# Patient Record
Sex: FEMALE | Race: AMERICAN INDIAN OR ALASKA NATIVE
[De-identification: names, ages, dates, MRNs, and addresses within clinical notes are randomized per-mention and may not be internally consistent; named-entity substitution may affect disease eponyms.]

---

## 2017-06-28 ENCOUNTER — HOSPITAL ENCOUNTER (EMERGENCY)
Dept: HOSPITAL 5 - ED | Age: 52
LOS: 10 days | Discharge: PSYCHIATRIC OR SUBSTANCE USE FACILITY | End: 2017-07-08
Payer: MEDICAID

## 2017-06-28 DIAGNOSIS — F29: ICD-10-CM

## 2017-06-28 DIAGNOSIS — N39.0: ICD-10-CM

## 2017-06-28 DIAGNOSIS — F20.0: Primary | ICD-10-CM

## 2017-06-28 LAB
ANION GAP SERPL CALC-SCNC: 20 MMOL/L
BASOPHILS NFR BLD AUTO: 0.3 % (ref 0–1.8)
BILIRUB UR QL STRIP: (no result)
BLOOD UR QL VISUAL: (no result)
BUN SERPL-MCNC: 11 MG/DL (ref 7–17)
BUN/CREAT SERPL: 13.75 %
CALCIUM SERPL-MCNC: 9 MG/DL (ref 8.4–10.2)
CHLORIDE SERPL-SCNC: 103.8 MMOL/L (ref 98–107)
CO2 SERPL-SCNC: 22 MMOL/L (ref 22–30)
EOSINOPHIL NFR BLD AUTO: 0.4 % (ref 0–4.3)
GLUCOSE SERPL-MCNC: 89 MG/DL (ref 65–100)
HCT VFR BLD CALC: 40.7 % (ref 30.3–42.9)
HGB BLD-MCNC: 13.3 GM/DL (ref 10.1–14.3)
KETONES UR STRIP-MCNC: (no result) MG/DL
LEUKOCYTE ESTERASE UR QL STRIP: (no result)
MCH RBC QN AUTO: 28 PG (ref 28–32)
MCHC RBC AUTO-ENTMCNC: 33 % (ref 30–34)
MCV RBC AUTO: 85 FL (ref 79–97)
MUCOUS THREADS #/AREA URNS HPF: (no result) /HPF
NITRITE UR QL STRIP: (no result)
PH UR STRIP: 5 [PH] (ref 5–7)
PLATELET # BLD: 166 K/MM3 (ref 140–440)
POTASSIUM SERPL-SCNC: 3.8 MMOL/L (ref 3.6–5)
RBC # BLD AUTO: 4.77 M/MM3 (ref 3.65–5.03)
RBC #/AREA URNS HPF: 9 /HPF (ref 0–6)
SODIUM SERPL-SCNC: 142 MMOL/L (ref 137–145)
URINE DRUGS OF ABUSE NOTE: (no result)
UROBILINOGEN UR-MCNC: < 2 MG/DL (ref ?–2)
WBC # BLD AUTO: 6.3 K/MM3 (ref 4.5–11)
WBC #/AREA URNS HPF: > 182 /HPF (ref 0–6)

## 2017-06-28 PROCEDURE — G0480 DRUG TEST DEF 1-7 CLASSES: HCPCS

## 2017-06-28 PROCEDURE — 36415 COLL VENOUS BLD VENIPUNCTURE: CPT

## 2017-06-28 PROCEDURE — 80048 BASIC METABOLIC PNL TOTAL CA: CPT

## 2017-06-28 PROCEDURE — 96372 THER/PROPH/DIAG INJ SC/IM: CPT

## 2017-06-28 PROCEDURE — 80320 DRUG SCREEN QUANTALCOHOLS: CPT

## 2017-06-28 PROCEDURE — 99285 EMERGENCY DEPT VISIT HI MDM: CPT

## 2017-06-28 PROCEDURE — 80307 DRUG TEST PRSMV CHEM ANLYZR: CPT

## 2017-06-28 PROCEDURE — 85025 COMPLETE CBC W/AUTO DIFF WBC: CPT

## 2017-06-28 PROCEDURE — 81001 URINALYSIS AUTO W/SCOPE: CPT
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
## 2017-06-29 RX ADMIN — NITROFURANTOIN MONOHYDRATE/MACROCRYSTALS SCH MG: 75; 25 CAPSULE ORAL at 21:23

## 2017-06-29 RX ADMIN — NITROFURANTOIN MONOHYDRATE/MACROCRYSTALS SCH MG: 75; 25 CAPSULE ORAL at 21:22

## 2017-06-29 RX ADMIN — NITROFURANTOIN MONOHYDRATE/MACROCRYSTALS SCH MG: 75; 25 CAPSULE ORAL at 10:22

## 2017-06-29 RX ADMIN — LORAZEPAM SCH MG: 1 TABLET ORAL at 21:22

## 2017-06-29 NOTE — CONSULTATION
History of Present Illness





- Reason for Consult


Consult date: 06/29/17


Reason for consult: Mental Health Evaluation


Requesting physician: ARIES PERLA





- Chief Complaint


Chief complaint: 


"Patient is nonverbal at this time"








- History of Present Psychiatric Illness


52-year-old female with past medical history of paranoid schizophrenia has 

presented to ED secondary to concerns by her caretaker. Today patient is 

nonverbal with a rigid posture. Patient would not answer any questions, other 

than saying hello to me. She would stare at the wall when asked questions, 

possibly responding to some type of stimuli. No gestures of SI/HI's. 








Medications and Allergies


 Allergies











Allergy/AdvReac Type Severity Reaction Status Date / Time


 


haloperidol [From Haldol] AdvReac  Unknown Verified 07/31/16 20:22











 Home Medications











 Medication  Instructions  Recorded  Confirmed  Last Taken  Type


 


Benztropine [Cogentin] 1 mg PO BID 07/30/16 06/28/17 Unknown History


 


Divalproex Sodium [Depakote] 1,500 mg PO QHS 07/30/16 06/28/17 Unknown History


 


Paliperidone Palmitate [Invega 325 mg IM Q3W 07/30/16 06/28/17 Unknown History





Sustenna]     


 


Aspirin [Aspirin TAB] 81 mg PO QDAY #30 tablet 08/01/16 06/28/17 Unknown Rx


 


Nitrofurantoin Mono/M-Cryst 100 mg PO Q12HR #14 capsule 06/29/17  Unknown Rx





[Macrobid CAP]     











Active Meds: 


Active Medications





Nitrofurantoin Macrocrystals (Macrobid)  100 mg PO Q12HR KATHYA


   Last Admin: 06/29/17 10:22 Dose:  100 mg











Past psychiatric history





- Past Medical History


Past Medical History: other (unable to obtain)


Past Surgical History: Other (unable to obtain)





- past Psychiatric treatment and history


psychiatric treatment history: 


Unable to obtain psy hx or fam psy hx








- Social History


Social history: other (unable to obtain)





Mental Status Exam





- Vital signs


 Last Vital Signs











Temp  97.7 F   06/29/17 11:25


 


Pulse  84   06/29/17 11:25


 


Resp  20   06/29/17 11:39


 


BP  110/69   06/29/17 11:25


 


Pulse Ox  97   06/29/17 11:39














- Exam


Narrative exam: 


Unable to complete MSE. Patient presenting with a rigid posture.








Results


Result Diagrams: 


 06/28/17 19:02





 06/28/17 19:02


 Abnormal lab results











  06/28/17 06/28/17 06/28/17 Range/Units





  18:45 19:02 19:02 


 


RDW   15.6 H   (13.2-15.2)  %


 


Mono % (Auto)   8.5 H   (0.0-7.3)  %


 


Ur Specific Gravity  1.032 H    (1.003-1.030)  


 


Urine WBC (Auto)  > 182.0 H    (0.0-6.0)  /HPF


 


U Epithel Cells (Auto)  16.0 H    (0-13.0)  /HPF


 


Salicylates    < 0.3 L  (2.8-20.0)  mg/dL








All other labs normal.








Assessment and Plan


Assessment and plan: 


Impression: Historical Dx: Schizophrenia, Possible Catatonia. Today patient is 

nonverbal with a rigid posture. Patient would not answer any questions, other 

than saying hello to me. No gestures of SI/HI's. .





DDx: Unspecified Psychosis DO





Recommendation/Plan: Continue 1013 with placement to inpatient psy services. 

Start Ativan 1 mg PO TID for catatonia. Will continue to assess patient daily 

to determine further treatment. Recommend GI and PE prophylaxis. Monitor patient

's V/S's and O2 sats Q4hrs.

## 2017-06-30 RX ADMIN — LORAZEPAM SCH MG: 1 TABLET ORAL at 10:25

## 2017-06-30 RX ADMIN — NITROFURANTOIN MONOHYDRATE/MACROCRYSTALS SCH MG: 75; 25 CAPSULE ORAL at 22:04

## 2017-06-30 RX ADMIN — LORAZEPAM SCH: 1 TABLET ORAL at 19:43

## 2017-06-30 RX ADMIN — LORAZEPAM SCH: 1 TABLET ORAL at 17:48

## 2017-06-30 RX ADMIN — NITROFURANTOIN MONOHYDRATE/MACROCRYSTALS SCH MG: 75; 25 CAPSULE ORAL at 10:25

## 2017-06-30 NOTE — PROGRESS NOTE
Subjective





- Reason for Consult


Consult date: 06/30/17


Reason for consult: follow up





- Chief Complaint


Chief complaint: 


no chief complaint





52-year-old female with past medical history of paranoid schizophrenia 

presented to the ED secondary to concerns by her caregiver. Today patient is 

nonverbal but will move her head with a nod or shake in response to questions. 

She shook her head when she asked about problems with her appetite or sleep. 

After attempts to interview her, she states "I have to go to the bathroom." She 

did so without difficulty. No gestures of SI/HI's. 





Mental Status Exam





- Vital signs


 Last Vital Signs











Temp  97.7 F   06/30/17 10:51


 


Pulse  81   06/30/17 10:51


 


Resp  18   06/30/17 10:26


 


BP  109/76   06/30/17 10:51


 


Pulse Ox  99   06/30/17 10:51














- Exam


Narrative exam: 





minimally cooperative


steady gait


unable to complete MSE.


Orientation: person


Level of consciousness: alert





Assessment and Plan





Impression: Historical Dx: Schizophrenia, Possible Catatonia. Today patient is 

mostly non verbal Patient would not answer any questions but would shake or nod 

her head in response to some questions. No gestures of SI/HI's.





DDx: Unspecified Psychosis DO





Recommendation/Plan: Continue 1013 with placement to inpatient psy services. 

Continue Ativan 1 mg PO TID for catatonia. Will continue to assess patient 

daily to determine further treatment. Recommend GI and PE prophylaxis. Monitor 

patient's V/S's and O2 sats Q4hrs.

## 2017-07-01 RX ADMIN — LORAZEPAM SCH: 1 TABLET ORAL at 20:11

## 2017-07-01 RX ADMIN — NITROFURANTOIN MONOHYDRATE/MACROCRYSTALS SCH MG: 75; 25 CAPSULE ORAL at 10:49

## 2017-07-01 RX ADMIN — LORAZEPAM SCH MG: 1 TABLET ORAL at 09:00

## 2017-07-01 RX ADMIN — LORAZEPAM SCH MG: 1 TABLET ORAL at 14:10

## 2017-07-01 RX ADMIN — NITROFURANTOIN MONOHYDRATE/MACROCRYSTALS SCH MG: 75; 25 CAPSULE ORAL at 22:10

## 2017-07-01 NOTE — PROGRESS NOTE
Subjective





- Reason for Consult


Reason for consult: disorganized





Mental Status Exam





- Vital signs


 Last Vital Signs











Temp  97.8 F   07/01/17 12:25


 


Pulse  84   07/01/17 12:25


 


Resp  18   07/01/17 12:25


 


BP  120/79   07/01/17 12:25


 


Pulse Ox  100   07/01/17 12:25














Assessment and Plan





Subjectively, patient continues to be fairly withdrawn and minimally responsive 

to questioning.  The nursing staff do note the patient's ability to engage them 

has improved marginally over the past 24 hours.





General Appearance: In hospital gown


Sensorium/Consciousness: Somewhat sedated


Orientation:  person, place


Eye Contact: limited


Attitude / Behavior: guarded


Psychomotor &


Musculoskeletal Activity: PMR, rigidity noted on upper extremities


Mood: Withdrawn


Affect: Flat


Speech / Language: Nonspontaneous, reduced rate and volume


Thought Processes: Perseverative and disorganized


Thought Content: Did not report


Perception: Endorses auditory hallucinations


Insight: limited


Judgement: limitied


Capacity for ADLs: independent








Plan:


Continue lorazepam 1 mg 3 times a day with possibility of increasing the 

frequency of this medication


We'll continue to evaluate if the patient can be rechallenged or challenged 

with an antipsychotic given the current presentation


Continue to refer this patient for inpatient level of care

## 2017-07-02 RX ADMIN — LORAZEPAM SCH MG: 1 TABLET ORAL at 08:03

## 2017-07-02 RX ADMIN — NITROFURANTOIN MONOHYDRATE/MACROCRYSTALS SCH MG: 75; 25 CAPSULE ORAL at 10:05

## 2017-07-02 RX ADMIN — LORAZEPAM SCH MG: 1 TABLET ORAL at 22:23

## 2017-07-02 RX ADMIN — NITROFURANTOIN MONOHYDRATE/MACROCRYSTALS SCH MG: 75; 25 CAPSULE ORAL at 22:22

## 2017-07-02 RX ADMIN — LORAZEPAM SCH MG: 1 TABLET ORAL at 14:07

## 2017-07-02 RX ADMIN — LORAZEPAM SCH MG: 1 TABLET ORAL at 18:03

## 2017-07-02 NOTE — PROGRESS NOTE
Subjective





- Reason for Consult


Reason for consult: disorganized





Mental Status Exam





- Vital signs


 Last Vital Signs











Temp  97.5 F L  07/02/17 09:30


 


Pulse  96 H  07/02/17 09:30


 


Resp  18   07/02/17 09:30


 


BP  104/64   07/02/17 09:30


 


Pulse Ox  100   07/02/17 09:30














Assessment and Plan





Subjectively, patient continues to be fairly withdrawn and minimally responsive 

to questioning.  Patient still malodorous, and expressing auditory 

hallucinations.  Patient still having psychomotor retardation.





General Appearance: In hospital gown


Sensorium/Consciousness: Somewhat sedated


Orientation:  person, place


Eye Contact: limited


Attitude / Behavior: guarded


Psychomotor &


Musculoskeletal Activity: PMR, rigidity noted on upper extremities


Mood: Withdrawn


Affect: Flat


Speech / Language: Nonspontaneous, reduced rate and volume


Thought Processes: Perseverative and disorganized


Thought Content: Did not report


Perception: Endorses auditory hallucinations


Insight: limited


Judgement: limitied


Capacity for ADLs: independent








Plan:


Increase to lorazepam 1 mg 4 times a day


Continue to refer this patient for inpatient level of care

## 2017-07-03 RX ADMIN — LORAZEPAM SCH MG: 1 TABLET ORAL at 21:55

## 2017-07-03 RX ADMIN — NITROFURANTOIN MONOHYDRATE/MACROCRYSTALS SCH MG: 75; 25 CAPSULE ORAL at 21:55

## 2017-07-03 RX ADMIN — NITROFURANTOIN MONOHYDRATE/MACROCRYSTALS SCH MG: 75; 25 CAPSULE ORAL at 09:50

## 2017-07-03 NOTE — PROGRESS NOTE
Subjective





- Reason for Consult


Consult date: 07/03/17


Reason for consult: Psychiatry Follow-up





- Chief Complaint


Chief complaint: 


"How are you"





52-year-old female with past medical history of paranoid schizophrenia 

presented to the ED secondary to concerns by her caregiver. Today patient is 

more engaging by saying "hello" and shaking my hand. She sit up when asked. No 

gestures of SI/HI's and AVH's.  





Mental Status Exam





- Vital signs


 Last Vital Signs











Temp  98.0 F   07/03/17 07:36


 


Pulse  81   07/03/17 07:36


 


Resp  16   07/03/17 07:38


 


BP  123/69   07/03/17 07:36


 


Pulse Ox  100   07/03/17 07:36














- Exam


Narrative exam: 


MSE:





 Appearance: calm, cooperative


 Behavior: poor eye contact


 Speech: regular rate and tone


 Mood: "I am not sure" 


 Affect: flat


 Thought Process: unable to assess


 Thought Content: no gestures of SI/HI's and AVH's


 Motor Activity: lying in bed, no rigidity


 Cognition: A/Ox 1


 Insight: limited


 Judgment: limited











Assessment and Plan


Impression:  Historical Dx: Schizophrenia, Possible Catatonia. Today patient is 

more engaging by saying "hello" and shaking my hand. No rigidity noted. No 

gestures of SI/HI's and AVH's.





Recommendation/Plan: Continue 1013 with placement to inpatient psy services. 

Continue Ativan 1 mg PO QID for catatonia. Will continue to assess patient 

daily to determine further treatment. Recommend GI and PE prophylaxis. Monitor 

patient's V/S's and O2 sats Q4hrs.

## 2017-07-04 RX ADMIN — NITROFURANTOIN MONOHYDRATE/MACROCRYSTALS SCH MG: 75; 25 CAPSULE ORAL at 10:00

## 2017-07-04 RX ADMIN — LORAZEPAM SCH MG: 1 TABLET ORAL at 20:59

## 2017-07-04 RX ADMIN — NITROFURANTOIN MONOHYDRATE/MACROCRYSTALS SCH MG: 75; 25 CAPSULE ORAL at 21:38

## 2017-07-04 RX ADMIN — LORAZEPAM SCH MG: 1 TABLET ORAL at 10:00

## 2017-07-04 NOTE — PROGRESS NOTE
Subjective





- Reason for Consult


Consult date: 17


Reason for consult: Psychiatry Follow-up





- Chief Complaint


Chief complaint: 


"Hello" 





52-year-old female with past medical history of paranoid schizophrenia 

presented to the ED secondary to concerns by her caregiver. Today patient is 

calm and cooperative during assessment. She greeted me when I arrived to her 

room. She stated that she ate her breakfast. Also, patient acknowledged that 

she resides in a group home. She could not tell me her  and her current 

location. She denies SI/HI's and AVH's. 





Mental Status Exam





- Vital signs


 Last Vital Signs











Temp  97.8 F   17 03:45


 


Pulse  96 H  17 03:45


 


Resp  16   17 03:45


 


BP  105/57   17 03:45


 


Pulse Ox  98   17 03:45














- Exam


Narrative exam: 


MSE:





 Appearance: calm, cooperative


 Behavior: poor eye contact


 Speech: regular rate and tone


 Mood: "fine" 


 Affect: labile


 Thought Process: circumstantial


 Thought Content: denies SI/HI's and AVH's


 Motor Activity: sitting up in bed, no rigidity


 Cognition: A/Ox 2


 Insight: limited


 Judgment: limited








Assessment and Plan


Impression:  Historical Dx: Schizophrenia, Possible Catatonia. Today patient is 

calm and cooperative during assessment. She greeted me when I arrived to her 

room. No rigidity noted. Patient denies SI/HI's and AVH's. Patient was not 

administered Ativan yesterday (7/3/2017) x 3.  





Recommendation/Plan: Continue 1013 with placement to inpatient psy services. 

Continue Ativan to 1 mg PO QID for catatonia. Will continue to assess patient 

daily to determine further treatment. Recommend GI and PE prophylaxis. Monitor 

patient's V/S's and O2 sats Q4hrs.

## 2017-07-05 RX ADMIN — ZIPRASIDONE HYDROCHLORIDE SCH MG: 20 CAPSULE ORAL at 23:00

## 2017-07-05 RX ADMIN — NITROFURANTOIN MONOHYDRATE/MACROCRYSTALS SCH MG: 75; 25 CAPSULE ORAL at 23:00

## 2017-07-05 NOTE — PROGRESS NOTE
Subjective





- Reason for Consult


Consult date: 07/05/17


Reason for consult: follow up





- Chief Complaint


Chief complaint: 


no response 





52-year-old female with past medical history of paranoid schizophrenia 

presented to the ED secondary to concerns by her caregiver. Today patient is 

calm and  was attentive for most interview questions. She was eating when I 

arrived to the room and was rocking side to side. She denies SI/HI's and AVH's. 

She was later observed responding to internal stimuli. 





Mental Status Exam





- Vital signs


 Last Vital Signs











Temp  98.1 F   07/05/17 07:15


 


Pulse  74   07/05/17 07:15


 


Resp  20   07/05/17 07:44


 


BP  136/88   07/05/17 07:15


 


Pulse Ox  100   07/05/17 07:44














Assessment and Plan





MSE:





 Appearance: calm, cooperative


 Behavior: poor eye contact


 Speech: regular rate and tone


 Mood: indifferent


 Affect: constricted


 Thought Process: circumstantial


 Thought Content: denies SI/HI's and AVH's


 Motor Activity: rocking side to side, no rigidity


 Cognition: A/Ox 2


 Insight: limited


 Judgment: limited








Assessment and Plan


Impression:  Historical Dx: Schizophrenia, Catatonic symptoms appear to have 

resolved. Psychosis remains. Geodon was started 20mg bid. This will be 

continued. 





Recommendation/Plan: Continue 1013 with placement to inpatient psy services. 

Decrease Ativan to 1mg bid with a plan to discontinue.

## 2017-07-06 RX ADMIN — NITROFURANTOIN MONOHYDRATE/MACROCRYSTALS SCH MG: 75; 25 CAPSULE ORAL at 10:25

## 2017-07-06 RX ADMIN — ZIPRASIDONE HYDROCHLORIDE SCH MG: 20 CAPSULE ORAL at 22:40

## 2017-07-06 RX ADMIN — LORAZEPAM SCH MG: 1 TABLET ORAL at 22:40

## 2017-07-06 RX ADMIN — NITROFURANTOIN MONOHYDRATE/MACROCRYSTALS SCH MG: 75; 25 CAPSULE ORAL at 22:40

## 2017-07-06 RX ADMIN — ZIPRASIDONE HYDROCHLORIDE SCH MG: 20 CAPSULE ORAL at 10:26

## 2017-07-06 RX ADMIN — LORAZEPAM SCH MG: 1 TABLET ORAL at 10:25

## 2017-07-06 NOTE — PROGRESS NOTE
Subjective





- Reason for Consult


Consult date: 07/06/17


Reason for consult: Psychiatry Follow-up





- Chief Complaint


Chief complaint: 


"Hello" 





52-year-old female with past medical history of paranoid schizophrenia 

presented to the ED secondary to concerns by her caregiver. Today patient is 

calm, but disorganized during assessment. Patient had to be redirected during 

conversation x 2. She would pause in the middle of her sentences, possibly 

responding to some type of stimuli. She denies SI/HI's and AVH's. Patient was 

observed eating her breakfast.  





Mental Status Exam





- Vital signs


 Last Vital Signs











Temp  98.6 F   07/05/17 20:00


 


Pulse  71   07/05/17 20:00


 


Resp  17   07/05/17 20:00


 


BP  145/87   07/05/17 20:00


 


Pulse Ox  97   07/05/17 20:00














- Exam


Narrative exam: 


MSE:





 Appearance: calm, cooperative


 Behavior: poor eye contact


 Speech: regular rate and tone


 Mood: "okay" 


 Affect: labile


 Thought Process: tangential 


 Thought Content: denies SI/HI's and AVH's, disorganized


 Motor Activity: sitting up in bed, no rigidity


 Cognition: A/Ox 2


 Insight: limited


 Judgment: limited








Assessment and Plan


Impression:  Historical Dx: Schizophrenia, No catatonia S/S's. Today patient is 

calm, but disorganized and had to be redirected during conversation. Patient 

denies SI/HI's and AVH's. Patient was not administered Ativan yesterday (7/3/

2017) x 3.  





Recommendation/Plan: Continue 1013 with placement to inpatient psy services. 

Taper Ativan to 1 mg PO once tomorrow. Continue Geodon 20 mg PO BID for 

schizophrenia. Discussed possible metabolic side effects of Geodon.

## 2017-07-07 RX ADMIN — ZIPRASIDONE HYDROCHLORIDE SCH MG: 20 CAPSULE ORAL at 10:08

## 2017-07-07 RX ADMIN — ZIPRASIDONE HYDROCHLORIDE SCH MG: 40 CAPSULE ORAL at 22:10

## 2017-07-07 RX ADMIN — NITROFURANTOIN MONOHYDRATE/MACROCRYSTALS SCH MG: 75; 25 CAPSULE ORAL at 22:13

## 2017-07-07 RX ADMIN — LORAZEPAM SCH MG: 1 TABLET ORAL at 10:07

## 2017-07-07 RX ADMIN — NITROFURANTOIN MONOHYDRATE/MACROCRYSTALS SCH MG: 75; 25 CAPSULE ORAL at 10:07

## 2017-07-07 NOTE — PROGRESS NOTE
Subjective





- Reason for Consult


Consult date: 07/07/17


Reason for consult: Psychiatry Follow-up





- Chief Complaint


Chief complaint: 


"Hi" 





52-year-old female with past medical history of paranoid schizophrenia 

presented to the ED secondary to concerns by her caregiver. Today patient is 

calm, again disorganized during assessment. Patient was standing and pacing her 

room during our conversation. She answered most of my questions. She denies SI/

HI's and AVH's. During our conversation, she would pause for seconds before 

answering, possibly responding to some type of stimuli.  





Mental Status Exam





- Vital signs


 Last Vital Signs











Temp  98.3 F   07/07/17 10:48


 


Pulse  101 H  07/07/17 10:48


 


Resp  18   07/07/17 10:49


 


BP  96/66   07/07/17 10:48


 


Pulse Ox  97   07/07/17 10:49














- Exam


Narrative exam: 


MSE:





 Appearance: calm, cooperative


 Behavior: poor eye contact


 Speech: regular rate and tone


 Mood: "okay" 


 Affect: labile


 Thought Process: tangential 


 Thought Content: denies SI/HI's and AVH's, disorganized


 Motor Activity: sitting up in bed, no rigidity


 Cognition: A/Ox 2


 Insight: limited


 Judgment: limited








Assessment and Plan


Impression:  Historical Dx: Schizophrenia, No catatonia S/S's. Today patient is 

calm, again disorganized during assessment. Patient was standing and pacing her 

room during our conversation. 





Recommendation/Plan: Continue 1013 with placement to inpatient psy services. 

Taper Ativan to 1 mg PO once tomorrow. Increase Geodon to 40 mg PO BID for 

schizophrenia. Discussed possible metabolic side effects of Geodon.

## 2017-07-08 VITALS — DIASTOLIC BLOOD PRESSURE: 96 MMHG | SYSTOLIC BLOOD PRESSURE: 131 MMHG

## 2017-07-08 RX ADMIN — NITROFURANTOIN MONOHYDRATE/MACROCRYSTALS SCH MG: 75; 25 CAPSULE ORAL at 10:36

## 2017-07-08 RX ADMIN — ZIPRASIDONE HYDROCHLORIDE SCH MG: 40 CAPSULE ORAL at 10:36

## 2017-07-08 NOTE — EMERGENCY DEPARTMENT REPORT
Blank Doc





- Documentation


Documentation: 





Patient has been accepted at [facility] by Dr. Martinez.  Patient is 

awaiting transport.

## 2017-11-28 ENCOUNTER — HOSPITAL ENCOUNTER (EMERGENCY)
Dept: HOSPITAL 5 - ED | Age: 52
LOS: 2 days | Discharge: OTHER INSTITUTION | End: 2017-11-30
Payer: MEDICAID

## 2017-11-28 DIAGNOSIS — Z88.8: ICD-10-CM

## 2017-11-28 DIAGNOSIS — F20.0: Primary | ICD-10-CM

## 2017-11-28 DIAGNOSIS — N30.00: ICD-10-CM

## 2017-11-28 DIAGNOSIS — Z79.82: ICD-10-CM

## 2017-11-28 LAB
ALBUMIN SERPL-MCNC: 4.5 G/DL (ref 3.9–5)
ALBUMIN/GLOB SERPL: 1.2 %
ALP SERPL-CCNC: 68 UNITS/L (ref 35–129)
ALT SERPL-CCNC: 10 UNITS/L (ref 7–56)
ANION GAP SERPL CALC-SCNC: 25 MMOL/L
BILIRUB SERPL-MCNC: 0.3 MG/DL (ref 0.1–1.2)
BILIRUB UR QL STRIP: (no result)
BLASTOCYTES % (MANUAL): 0 %
BLOOD UR QL VISUAL: (no result)
BUN SERPL-MCNC: 16 MG/DL (ref 7–17)
BUN/CREAT SERPL: 23 %
CALCIUM SERPL-MCNC: 9.1 MG/DL (ref 8.4–10.2)
CHLORIDE SERPL-SCNC: 99.9 MMOL/L (ref 98–107)
CO2 SERPL-SCNC: 21 MMOL/L (ref 22–30)
GLUCOSE SERPL-MCNC: 80 MG/DL (ref 65–100)
HCT VFR BLD CALC: 46.1 % (ref 30.3–42.9)
HGB BLD-MCNC: 14.8 GM/DL (ref 10.1–14.3)
KETONES UR STRIP-MCNC: 20 MG/DL
LEUKOCYTE ESTERASE UR QL STRIP: (no result)
MCH RBC QN AUTO: 30 PG (ref 28–32)
MCHC RBC AUTO-ENTMCNC: 32 % (ref 30–34)
MCV RBC AUTO: 92 FL (ref 79–97)
MUCOUS THREADS #/AREA URNS HPF: (no result) /HPF
NITRITE UR QL STRIP: (no result)
PH UR STRIP: 5 [PH] (ref 5–7)
PLATELET # BLD: 172 K/MM3 (ref 140–440)
POTASSIUM SERPL-SCNC: 4.2 MMOL/L (ref 3.6–5)
PROT SERPL-MCNC: 8.2 G/DL (ref 6.3–8.2)
RBC # BLD AUTO: 5.03 M/MM3 (ref 3.65–5.03)
RBC #/AREA URNS HPF: 5 /HPF (ref 0–6)
SODIUM SERPL-SCNC: 142 MMOL/L (ref 137–145)
TOTAL CELLS COUNTED PERCENT: 11
URINE DRUGS OF ABUSE NOTE: (no result)
UROBILINOGEN UR-MCNC: < 2 MG/DL (ref ?–2)
WBC # BLD AUTO: 8.7 K/MM3 (ref 4.5–11)
WBC #/AREA URNS HPF: 134 /HPF (ref 0–6)

## 2017-11-28 PROCEDURE — G0480 DRUG TEST DEF 1-7 CLASSES: HCPCS

## 2017-11-28 PROCEDURE — 85007 BL SMEAR W/DIFF WBC COUNT: CPT

## 2017-11-28 PROCEDURE — 80053 COMPREHEN METABOLIC PANEL: CPT

## 2017-11-28 PROCEDURE — 81001 URINALYSIS AUTO W/SCOPE: CPT

## 2017-11-28 PROCEDURE — 99284 EMERGENCY DEPT VISIT MOD MDM: CPT

## 2017-11-28 PROCEDURE — 80320 DRUG SCREEN QUANTALCOHOLS: CPT

## 2017-11-28 PROCEDURE — 36415 COLL VENOUS BLD VENIPUNCTURE: CPT

## 2017-11-28 PROCEDURE — 80307 DRUG TEST PRSMV CHEM ANLYZR: CPT

## 2017-11-28 PROCEDURE — 85025 COMPLETE CBC W/AUTO DIFF WBC: CPT
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
## 2017-11-29 RX ADMIN — BENZTROPINE MESYLATE SCH MG: 0.5 TABLET ORAL at 10:02

## 2017-11-29 RX ADMIN — BENZTROPINE MESYLATE SCH MG: 0.5 TABLET ORAL at 21:33
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
## 2017-11-30 VITALS — SYSTOLIC BLOOD PRESSURE: 137 MMHG | DIASTOLIC BLOOD PRESSURE: 66 MMHG

## 2017-11-30 RX ADMIN — BENZTROPINE MESYLATE SCH: 0.5 TABLET ORAL at 11:39
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
## 2020-08-13 ENCOUNTER — HOSPITAL ENCOUNTER (INPATIENT)
Dept: HOSPITAL 5 - ED | Age: 55
LOS: 19 days | Discharge: HOME HEALTH | DRG: 871 | End: 2020-09-01
Attending: INTERNAL MEDICINE | Admitting: INTERNAL MEDICINE
Payer: MEDICAID

## 2020-08-13 DIAGNOSIS — U07.1: ICD-10-CM

## 2020-08-13 DIAGNOSIS — N17.0: ICD-10-CM

## 2020-08-13 DIAGNOSIS — R13.19: ICD-10-CM

## 2020-08-13 DIAGNOSIS — M62.82: ICD-10-CM

## 2020-08-13 DIAGNOSIS — G93.41: ICD-10-CM

## 2020-08-13 DIAGNOSIS — J12.89: ICD-10-CM

## 2020-08-13 DIAGNOSIS — A41.89: Primary | ICD-10-CM

## 2020-08-13 DIAGNOSIS — D69.6: ICD-10-CM

## 2020-08-13 DIAGNOSIS — I10: ICD-10-CM

## 2020-08-13 DIAGNOSIS — F20.0: ICD-10-CM

## 2020-08-13 DIAGNOSIS — J96.01: ICD-10-CM

## 2020-08-13 DIAGNOSIS — E87.1: ICD-10-CM

## 2020-08-13 DIAGNOSIS — E66.01: ICD-10-CM

## 2020-08-13 DIAGNOSIS — Z79.899: ICD-10-CM

## 2020-08-13 LAB
ALBUMIN SERPL-MCNC: 3.7 G/DL (ref 3.9–5)
ALT SERPL-CCNC: 159 UNITS/L (ref 7–56)
BASOPHILS # (AUTO): 0.2 K/MM3 (ref 0–0.1)
BASOPHILS NFR BLD AUTO: 1.2 % (ref 0–1.8)
BILIRUB DIRECT SERPL-MCNC: 0.3 MG/DL (ref 0–0.2)
BUN SERPL-MCNC: 33 MG/DL (ref 7–17)
BUN/CREAT SERPL: 24 %
CALCIUM SERPL-MCNC: 8.6 MG/DL (ref 8.4–10.2)
EOSINOPHIL # BLD AUTO: 0 K/MM3 (ref 0–0.4)
EOSINOPHIL NFR BLD AUTO: 0 % (ref 0–4.3)
HCT VFR BLD CALC: 45 % (ref 30.3–42.9)
HEMOLYSIS INDEX: 14
HGB BLD-MCNC: 15 GM/DL (ref 10.1–14.3)
LYMPHOCYTES # BLD AUTO: 1.5 K/MM3 (ref 1.2–5.4)
LYMPHOCYTES NFR BLD AUTO: 10.6 % (ref 13.4–35)
MCHC RBC AUTO-ENTMCNC: 33 % (ref 30–34)
MCV RBC AUTO: 87 FL (ref 79–97)
MONOCYTES # (AUTO): 1.3 K/MM3 (ref 0–0.8)
MONOCYTES % (AUTO): 9.8 % (ref 0–7.3)
PLATELET # BLD: 97 K/MM3 (ref 140–440)
RBC # BLD AUTO: 5.15 M/MM3 (ref 3.65–5.03)

## 2020-08-13 PROCEDURE — 80320 DRUG SCREEN QUANTALCOHOLS: CPT

## 2020-08-13 PROCEDURE — G0480 DRUG TEST DEF 1-7 CLASSES: HCPCS

## 2020-08-13 PROCEDURE — 81001 URINALYSIS AUTO W/SCOPE: CPT

## 2020-08-13 PROCEDURE — 74176 CT ABD & PELVIS W/O CONTRAST: CPT

## 2020-08-13 PROCEDURE — 82962 GLUCOSE BLOOD TEST: CPT

## 2020-08-13 PROCEDURE — 94760 N-INVAS EAR/PLS OXIMETRY 1: CPT

## 2020-08-13 PROCEDURE — 82140 ASSAY OF AMMONIA: CPT

## 2020-08-13 PROCEDURE — 71045 X-RAY EXAM CHEST 1 VIEW: CPT

## 2020-08-13 PROCEDURE — 85007 BL SMEAR W/DIFF WBC COUNT: CPT

## 2020-08-13 PROCEDURE — 80164 ASSAY DIPROPYLACETIC ACD TOT: CPT

## 2020-08-13 PROCEDURE — 85025 COMPLETE CBC W/AUTO DIFF WBC: CPT

## 2020-08-13 PROCEDURE — 80053 COMPREHEN METABOLIC PANEL: CPT

## 2020-08-13 PROCEDURE — 87086 URINE CULTURE/COLONY COUNT: CPT

## 2020-08-13 PROCEDURE — 82728 ASSAY OF FERRITIN: CPT

## 2020-08-13 PROCEDURE — 36415 COLL VENOUS BLD VENIPUNCTURE: CPT

## 2020-08-13 PROCEDURE — 85379 FIBRIN DEGRADATION QUANT: CPT

## 2020-08-13 PROCEDURE — 85610 PROTHROMBIN TIME: CPT

## 2020-08-13 PROCEDURE — 85730 THROMBOPLASTIN TIME PARTIAL: CPT

## 2020-08-13 PROCEDURE — 82550 ASSAY OF CK (CPK): CPT

## 2020-08-13 PROCEDURE — 84145 PROCALCITONIN (PCT): CPT

## 2020-08-13 PROCEDURE — 83615 LACTATE (LD) (LDH) ENZYME: CPT

## 2020-08-13 PROCEDURE — 70450 CT HEAD/BRAIN W/O DYE: CPT

## 2020-08-13 PROCEDURE — 87040 BLOOD CULTURE FOR BACTERIA: CPT

## 2020-08-13 PROCEDURE — 86140 C-REACTIVE PROTEIN: CPT

## 2020-08-13 PROCEDURE — 80076 HEPATIC FUNCTION PANEL: CPT

## 2020-08-13 PROCEDURE — 85027 COMPLETE CBC AUTOMATED: CPT

## 2020-08-13 PROCEDURE — 74018 RADEX ABDOMEN 1 VIEW: CPT

## 2020-08-13 PROCEDURE — 71275 CT ANGIOGRAPHY CHEST: CPT

## 2020-08-13 PROCEDURE — 80307 DRUG TEST PRSMV CHEM ANLYZR: CPT

## 2020-08-13 PROCEDURE — 80048 BASIC METABOLIC PNL TOTAL CA: CPT

## 2020-08-13 NOTE — XRAY REPORT
CHEST 1 VIEW 



INDICATION / CLINICAL INFORMATION:

fever.



COMPARISON: 

None available.



FINDINGS:



SUPPORT DEVICES: None.

HEART / MEDIASTINUM: No significant abnormality. 

LUNGS / PLEURA: No significant pulmonary or pleural abnormality. No pneumothorax. 



ADDITIONAL FINDINGS: No significant additional findings.



IMPRESSION:

Suboptimal inspiration. No definite acute pulmonary or pleural abnormality



Signer Name: Alex Milton MD FACR 

Signed: 8/13/2020 11:03 PM

Workstation Name: [id number]

## 2020-08-13 NOTE — EMERGENCY DEPARTMENT REPORT
ED Psych HPI





- General


Chief Complaint: Medical Clearance


Stated Complaint: AMS


Time Seen by Provider: 08/13/20 22:22


Source: EMS


Mode of arrival: Stretcher





- History of Present Illness


Initial Comments: 





Ms. Mota is 55 years old female with history of paranoid schizophrenia.  P

atient brought to the emergency room via EMS from home after patient failed 

outpatient treatment.  Patient sent here by her psychiatric with a written 

letter stating that patient needed inpatient psychiatric treatment.  Patient is 

obtunded and not communicating.


MD Complaint: altered mental status





- Related Data


                                Home Medications











 Medication  Instructions  Recorded  Confirmed  Last Taken


 


Benztropine [Cogentin] 1 mg PO BID 07/30/16 11/28/17 Unknown


 


Divalproex Sodium [Depakote] 1,500 mg PO QHS 07/30/16 11/28/17 Unknown


 


Paliperidone Palmitate [Invega 325 mg IM Q3W 07/30/16 11/28/17 Unknown





Sustenna]    








                                  Previous Rx's











 Medication  Instructions  Recorded  Last Taken  Type


 


Aspirin 81 mg PO QDAY #30 tablet 08/01/16 Unknown Rx


 


Nitrofurantoin Mono/M-Cryst 100 mg PO Q12HR #14 capsule 06/29/17 Unknown Rx





[Macrobid CAP]    


 


cephALEXin [Keflex] 500 mg PO Q8HR #10 cap 11/28/17 Unknown Rx











                                    Allergies











Allergy/AdvReac Type Severity Reaction Status Date / Time


 


haloperidol [From Haldol] AdvReac  Unknown Verified 07/31/16 20:22














ED Review of Systems


ROS: 


Stated complaint: AMS


Other details as noted in HPI





Comment: Unobtainable due to pts medical conditions





ED Past Medical Hx





- Past Medical History


Hx Psychiatric Treatment: Yes (paranoid schizophrenia)





- Surgical History


Additional Surgical History: unknown per care giver





- Social History


Smoking Status: Never Smoker


Substance Use Type: None





- Medications


Home Medications: 


                                Home Medications











 Medication  Instructions  Recorded  Confirmed  Last Taken  Type


 


Benztropine [Cogentin] 1 mg PO BID 07/30/16 11/28/17 Unknown History


 


Divalproex Sodium [Depakote] 1,500 mg PO QHS 07/30/16 11/28/17 Unknown History


 


Paliperidone Palmitate [Invega 325 mg IM Q3W 07/30/16 11/28/17 Unknown History





Sustenna]     


 


Aspirin 81 mg PO QDAY #30 tablet 08/01/16 11/28/17 Unknown Rx


 


Nitrofurantoin Mono/M-Cryst 100 mg PO Q12HR #14 capsule 06/29/17 11/28/17 

Unknown Rx





[Macrobid CAP]     


 


cephALEXin [Keflex] 500 mg PO Q8HR #10 cap 11/28/17  Unknown Rx














ED Physical Exam





- General


Limitations: No Limitations


General appearance: obtunded





- Head


Head exam: Present: atraumatic, normocephalic, normal inspection





- Eye


Eye exam: Present: normal appearance





- Neck


Neck exam: Present: normal inspection, full ROM.  Absent: tenderness, mening

ismus





- Respiratory


Respiratory exam: Present: normal lung sounds bilaterally





- Cardiovascular


Cardiovascular Exam: Present: regular rate, normal rhythm, normal heart sounds





- GI/Abdominal


GI/Abdominal exam: Present: soft, normal bowel sounds.  Absent: distended, 

tenderness, guarding, rebound, rigid, organomegaly, mass, bruit, pulsatile mass,

 hernia





- Extremities Exam


Extremities exam: Present: normal inspection, full ROM, normal capillary refill





- Neurological Exam


Neurological exam: Present: altered





- Psychiatric


Psychiatric exam: Present: depressed





- Skin


Skin exam: Present: dry





ED Course


                                   Vital Signs











  08/13/20 08/13/20





  22:29 22:33


 


Temperature 99.9 F H 99.9 F H


 


Pulse Rate 99 H 99 H


 


Respiratory 18 20





Rate  


 


Blood Pressure 149/78 


 


Blood Pressure  133/83





[Right]  


 


O2 Sat by Pulse 96 96





Oximetry  














ED Medical Decision Making





- Lab Data


Result diagrams: 


                                 08/13/20 22:44





                                 08/13/20 22:44





- Radiology Data


Radiology results: report reviewed





- Medical Decision Making





Ms. Mota is 55 years old female with history of paranoid schizophrenia.  

Patient brought to the emergency room via EMS from home after patient failed out

patient treatment.  Patient sent here by her psychiatric with a written letter 

stating that patient needed inpatient psychiatric treatment.  Patient is 

obtunded and not communicating.





Patient found to be in rhabdomyolysis with a CK of 33,400.  Creatinine is 1.4 

patient was seen here 3 years ago and her baseline creatinine is 0.7.  Patient 

obviously in acute renal failure secondary to dehydration and developing 

rhabdomyolysis.  Patient started on normal saline.  I discussed the patient with

Dr. Downs, he agreed to admit the patient to medical service for further 

management.


Critical Care Time: Yes


Critical care time in (mins) excluding proc time.: 30


Critical care attestation.: 


If time is entered above; I have spent that time in minutes in the direct care 

of this critically ill patient, excluding procedure time.








ED Disposition


Clinical Impression: 


 Altered mental status, Rhabdomyolysis, Acute renal failure, Acute psychosis





Disposition: DC-09 OP ADMIT IP TO THIS HOSP


Is pt being admited?: Yes


Condition: Stable

## 2020-08-14 LAB
BENZODIAZEPINES SCREEN,URINE: (no result)
BILIRUB UR QL STRIP: (no result)
BLOOD UR QL VISUAL: (no result)
BUN SERPL-MCNC: 28 MG/DL (ref 7–17)
BUN/CREAT SERPL: 22 %
CALCIUM SERPL-MCNC: 7.9 MG/DL (ref 8.4–10.2)
CRP SERPL-MCNC: 9.4 MG/DL (ref 0–1.3)
HEMOLYSIS INDEX: 83
METHADONE SCREEN,URINE: (no result)
MUCOUS THREADS #/AREA URNS HPF: (no result) /HPF
OPIATE SCREEN,URINE: (no result)
PH UR STRIP: 5 [PH] (ref 5–7)
RBC #/AREA URNS HPF: 1 /HPF (ref 0–6)
UROBILINOGEN UR-MCNC: 2 MG/DL (ref ?–2)
WBC #/AREA URNS HPF: 17 /HPF (ref 0–6)

## 2020-08-14 PROCEDURE — B548ZZA ULTRASONOGRAPHY OF SUPERIOR VENA CAVA, GUIDANCE: ICD-10-PCS | Performed by: EMERGENCY MEDICINE

## 2020-08-14 PROCEDURE — 02HV33Z INSERTION OF INFUSION DEVICE INTO SUPERIOR VENA CAVA, PERCUTANEOUS APPROACH: ICD-10-PCS | Performed by: EMERGENCY MEDICINE

## 2020-08-14 RX ADMIN — HEPARIN SODIUM SCH UNIT: 5000 INJECTION, SOLUTION INTRAVENOUS; SUBCUTANEOUS at 11:40

## 2020-08-14 RX ADMIN — CEFTRIAXONE SODIUM SCH MLS/HR: 2 INJECTION, POWDER, FOR SOLUTION INTRAMUSCULAR; INTRAVENOUS at 11:40

## 2020-08-14 RX ADMIN — VALPROATE SODIUM SCH: 100 INJECTION, SOLUTION INTRAVENOUS at 22:50

## 2020-08-14 RX ADMIN — Medication SCH ML: at 22:53

## 2020-08-14 RX ADMIN — AZITHROMYCIN SCH MLS/HR: 500 INJECTION, POWDER, LYOPHILIZED, FOR SOLUTION INTRAVENOUS at 12:49

## 2020-08-14 RX ADMIN — Medication SCH ML: at 12:45

## 2020-08-14 RX ADMIN — VALPROATE SODIUM SCH MLS/HR: 100 INJECTION, SOLUTION INTRAVENOUS at 22:50

## 2020-08-14 RX ADMIN — ACETAMINOPHEN PRN MG: 325 TABLET ORAL at 23:13

## 2020-08-14 RX ADMIN — HEPARIN SODIUM SCH UNIT: 5000 INJECTION, SOLUTION INTRAVENOUS; SUBCUTANEOUS at 22:52

## 2020-08-14 NOTE — PROCEDURE NOTE
Date of procedure: 08/14/20


Pre-op diagnosis: IV access


Post-op diagnosis: same


Procedure: 





Hospital physician, Dr. BARRETT Henry, requested establishment of intravenous access 

for this patient with suspected rhabdomyolysis.  Nursing team and IV nurse 

unable to obtain IV access.  The patient is acutely altered and delirious, and 

thus not able to provide informed consent, therefore emergent/administrative to 

provide a consent is provided by myself and the aforementioned physician.





Using ultrasound guidance, the right sided internal jugular vein is easily 

identified.  The skin is cleansed and prepped with Betadine, and a 26-gauge 

needle is used to infiltrate 6 cc of 1% lidocaine with epinephrine.  Then, using

aseptic technique, a 3 inch 18-gauge Angiocath is inserted into the right 

internal jugular vein with ultrasound guidance, with 1 attempt.





Then, a Luer-Maria Elena is attached, and blood is easily aspirated.





A Biopatch is applied, followed by a Tegaderm.





The patient tolerated the procedure adequately, without significant 

complications.





Would recommend changing up this line for definitive IV access within the next 

24 hours.





Post IV placement management as per admitting team.





Please note that for the entire procedure, I had on complete personal protective

equipment.








Anesthesia: local


Surgeon: JORY WHITE


Assistant: RAMON HATCH


Estimated blood loss: minimal


Pathology: none


Condition: other (guarded)


Disposition: floor

## 2020-08-14 NOTE — HISTORY AND PHYSICAL REPORT
<PADMA AMESMarco A - Last Filed: 08/14/20 18:41>





History of Present Illness


Date of admission: 


08/14/20 01:29





History of present illness: 


This is a 55-year-old female with paranoid schizophrenia that presents to the 

emergency department on 8/13 for altered mental status in a catatonic state.  P

revious hospitalizations reviewed and only notes a history of paranoid 

schizophrenia. Patient may be a resident of [place].  Patient is currently 

nonverbal and HPI is received from ER documentation.  Work-up in the emergency 

department included a CT abdomen pelvis which shows patchy peripheral pam

undglass opacities bilateral lungs which is consistent with either viral or 

atypical pneumonia, positive anterior lateral fat hernia in the left mid abdomen

and a large uterine fibroids however her CXR showed no acute pulmonary or 

pleural abnormalities.  She was found to have acute kidney injury with a 

creatinine of 1.4/BUN 33 as baseline from previous records seems to be 0.7, 

rhabdomyolysis with a creatinine kinase of 33,481, leukocytosis with a WBC of 

13.8, thrombocytopenia with platelets at 97.  In the emergency department she 

received 2 L of IV fluids and a dose of Zosyn.  She will be admitted to the 

hospitalist service as a COVID PUI given findings on CT with IV antibiotics, 

rhabdomyolysis, and WHITNEY. We will rule out neuroleptic malignant syndrome.  

Infectious disease, psych, and neurology have been consulted. 








Medications and Allergies


                                    Allergies











Allergy/AdvReac Type Severity Reaction Status Date / Time


 


haloperidol [From Haldol] AdvReac  Unknown Verified 07/31/16 20:22











                                Home Medications











 Medication  Instructions  Recorded  Confirmed  Last Taken  Type


 


Benztropine [Cogentin] 1 mg PO BID 07/30/16 11/28/17 Unknown History


 


Divalproex Sodium [Depakote] 1,500 mg PO QHS 07/30/16 11/28/17 Unknown History


 


Paliperidone Palmitate [Invega 325 mg IM Q3W 07/30/16 11/28/17 Unknown History





Sustenna]     


 


Aspirin 81 mg PO QDAY #30 tablet 08/01/16 11/28/17 Unknown Rx


 


Nitrofurantoin Mono/M-Cryst 100 mg PO Q12HR #14 capsule 06/29/17 11/28/17 

Unknown Rx





[Macrobid CAP]     


 


cephALEXin [Keflex] 500 mg PO Q8HR #10 cap 11/28/17  Unknown Rx











Active Meds: 


Active Medications





Acetaminophen (Tylenol)  650 mg PO Q4H PRN


   PRN Reason: Pain MILD(1-3)/Fever >100.5/HA


Sodium Chloride (Nacl 0.9% 1000 Ml)  1,000 mls @ 125 mls/hr IV AS DIRECT KATHYA


Azithromycin 500 mg/ Sodium (Chloride)  250 mls @ 250 mls/hr IV Q24HR KATHYA; 

Protocol


Ceftriaxone Sodium (Rocephin/Ns 2 Gm/100 Ml)  2 gm in 100 mls @ 200 mls/hr IV 

Q24HR KATHYA; Protocol


Ondansetron HCl (Zofran)  4 mg IV Q8H PRN


   PRN Reason: Nausea And Vomiting


Sodium Chloride (Sodium Chloride Flush Syringe 10 Ml)  10 ml IV BID KATHYA


Sodium Chloride (Sodium Chloride Flush Syringe 10 Ml)  10 ml IV PRN PRN


   PRN Reason: LINE FLUSH


   Stop: 08/27/20 07:49











Review of Systems


ROS unobtainable: due to mental status





Exam





- Constitutional


Vitals: 


                                        











Temp Pulse Resp BP Pulse Ox


 


 99.9 F H  100 H  19   114/93   95 


 


 08/13/20 22:33  08/14/20 06:31  08/14/20 06:31  08/14/20 06:31  08/14/20 06:31











General appearance: Present: other (Patient slightly opens her eyes to tactile 

stimuli and withdraws to painful stimuli in all 4 extremities.  She is seen 

moving her left arm spontaneously.)





- EENT


Eyes: Present: PERRL





- Neck


Neck: Absent: carotid bruits





- Respiratory


Respiratory effort: normal


Respiratory: bilateral: diminished





- Cardiovascular


Rhythm: regular


Heart Sounds: Present: S1 & S2.  Absent: systolic murmur, diastolic murmur





- Extremities


Extremities: no ischemia, pulses intact, pulses symmetrical, No edema, normal 

temperature, normal color





- Abdominal


General gastrointestinal: Present: non-tender (No grimacing to palpation), non-

distended, normal bowel sounds





- Integumentary


Integumentary: Present: warm, dry





- Musculoskeletal


Musculoskeletal: other (Unable to assess)





- Psychiatric


Psychiatric: other





- Neurologic


Neurologic: other (Patient is seen moving her right arm spontaneously, she is 

nonverbal, opens her eyes slightly to tactile stimuli, withdraws to pain in all 

4 extremities)





- Allied Health


Allied health notes reviewed: nursing





Results





- Labs


CBC & Chem 7: 


                                 08/13/20 22:44





                                 08/14/20 14:24


Labs: 


                             Laboratory Last Values











WBC  13.8 K/mm3 (4.5-11.0)  H  08/13/20  22:44    


 


RBC  5.15 M/mm3 (3.65-5.03)  H  08/13/20  22:44    


 


Hgb  15.0 gm/dl (10.1-14.3)  H  08/13/20  22:44    


 


Hct  45.0 % (30.3-42.9)  H  08/13/20  22:44    


 


MCV  87 fl (79-97)   08/13/20  22:44    


 


MCH  29 pg (28-32)   08/13/20 22:44    


 


MCHC  33 % (30-34)   08/13/20  22:44    


 


RDW  14.7 % (13.2-15.2)   08/13/20  22:44    


 


Plt Count  97 K/mm3 (140-440)  L  08/13/20  22:44    


 


Lymph % (Auto)  10.6 % (13.4-35.0)  L  08/13/20  22:44    


 


Mono % (Auto)  9.8 % (0.0-7.3)  H  08/13/20  22:44    


 


Eos % (Auto)  0.0 % (0.0-4.3)   08/13/20 22:44    


 


Baso % (Auto)  1.2 % (0.0-1.8)   08/13/20  22:44    


 


Lymph #  1.5 K/mm3 (1.2-5.4)   08/13/20  22:44    


 


Mono #  1.3 K/mm3 (0.0-0.8)  H  08/13/20  22:44    


 


Eos #  0.0 K/mm3 (0.0-0.4)   08/13/20  22:44    


 


Baso #  0.2 K/mm3 (0.0-0.1)  H  08/13/20  22:44    


 


Seg Neutrophils %  78.4 % (40.0-70.0)  H  08/13/20  22:44    


 


Seg Neutrophils #  10.8 K/mm3 (1.8-7.7)  H  08/13/20  22:44    


 


Sodium  140 mmol/L (137-145)   08/13/20  22:44    


 


Potassium  3.6 mmol/L (3.6-5.0)   08/13/20  22:44    


 


Chloride  100.9 mmol/L ()   08/13/20  22:44    


 


Carbon Dioxide  21 mmol/L (22-30)  L  08/13/20  22:44    


 


Anion Gap  22 mmol/L  08/13/20  22:44    


 


BUN  33 mg/dL (7-17)  H  08/13/20  22:44    


 


Creatinine  1.4 mg/dL (0.6-1.2)  H  08/13/20  22:44    


 


Estimated GFR  47 ml/min  08/13/20  22:44    


 


BUN/Creatinine Ratio  24 %  08/13/20  22:44    


 


Glucose  112 mg/dL ()  H  08/13/20  22:44    


 


Lactic Acid  1.30 mmol/L (0.7-2.0)   08/13/20  22:44    


 


Calcium  8.6 mg/dL (8.4-10.2)   08/13/20  22:44    


 


Total Bilirubin  0.50 mg/dL (0.1-1.2)   08/13/20  22:44    


 


Direct Bilirubin  0.3 mg/dL (0-0.2)  H  08/13/20  22:44    


 


Indirect Bilirubin  0.2 mg/dL  08/13/20  22:44    


 


AST  766 units/L (5-40)  H  08/13/20  22:44    


 


ALT  159 units/L (7-56)  H  08/13/20  22:44    


 


Alkaline Phosphatase  49 units/L ()   08/13/20  22:44    


 


Total Creatine Kinase  81026 units/L ()  H  08/13/20  22:44    


 


Total Protein  7.6 g/dL (6.3-8.2)   08/13/20  22:44    


 


Albumin  3.7 g/dL (3.9-5)  L  08/13/20  22:44    


 


Albumin/Globulin Ratio  0.9 %  08/13/20  22:44    


 


Urine Color  Tiffany  (Yellow)   08/14/20  Unknown


 


Urine Turbidity  Slightly-cloudy  (Clear)   08/14/20  Unknown


 


Urine pH  5.0  (5.0-7.0)   08/14/20  Unknown


 


Ur Specific Gravity  1.026  (1.003-1.030)   08/14/20  Unknown


 


Urine Protein  100 mg/dl mg/dL (Negative)   08/14/20  Unknown


 


Urine Glucose (UA)  Neg mg/dL (Negative)   08/14/20  Unknown


 


Urine Ketones  Tr mg/dL (Negative)   08/14/20  Unknown


 


Urine Blood  Lg  (Negative)   08/14/20  Unknown


 


Urine Nitrite  Neg  (Negative)   08/14/20  Unknown


 


Urine Bilirubin  Neg  (Negative)   08/14/20  Unknown


 


Urine Urobilinogen  2.0 mg/dL (<2.0)   08/14/20  Unknown


 


Ur Leukocyte Esterase  Neg  (Negative)   08/14/20  Unknown


 


Urine WBC (Auto)  17.0 /HPF (0.0-6.0)  H  08/14/20  Unknown


 


Urine RBC (Auto)  1.0 /HPF (0.0-6.0)   08/14/20  Unknown


 


U Epithel Cells (Auto)  4.0 /HPF (0-13.0)   08/14/20  Unknown


 


Urine Mucus  2+ /HPF  08/14/20  Unknown


 


Salicylates  < 0.3 mg/dL (2.8-20.0)  L  08/13/20  22:44    


 


Urine Opiates Screen  Presumptive negative   08/14/20  Unknown


 


Urine Methadone Screen  Presumptive negative   08/14/20  Unknown


 


Acetaminophen  5.0 ug/mL (10.0-30.0)  L  08/13/20  22:44    


 


Ur Barbiturates Screen  Presumptive negative   08/14/20  Unknown


 


Ur Phencyclidine Scrn  Presumptive negative   08/14/20  Unknown


 


Ur Amphetamines Screen  Presumptive negative   08/14/20  Unknown


 


U Benzodiazepines Scrn  Presumptive negative   08/14/20  Unknown


 


Urine Cocaine Screen  Presumptive negative   08/14/20  Unknown


 


U Marijuana (THC) Screen  Presumptive negative   08/14/20  Unknown


 


Drugs of Abuse Note  Disclamer   08/14/20  Unknown


 


Plasma/Serum Alcohol  < 0.01 % (0-0.07)   08/13/20  22:44    











Microbiology: 


Microbiology





08/14/20 01:40   Peripheral/Venous   Blood Culture - Preliminary


                            Culture in Progress


08/14/20 01:59   Peripheral/Venous   Blood Culture - Preliminary


                            Culture in Progress











- Imaging and Cardiology


Chest x-ray: report reviewed, image reviewed


CT scan - abdomen: report reviewed


CT scan - pelvis: report reviewed





- Diagnostic Impressions


Diagnostic Impressions: 


8/14 CT abdomen/pelvis without contrast: 1. Patchy peripheral groundglass 

opacity in both lungs consistent with either viral or atypical pneumonia 2. 

Posterior lateral hernia of fat in the left midabdomen 3. Enlarged fibroid 

uterus


8/14 CXR: Suboptimal inspiration with no acute pulmonary or pleural 

abnormalities. 





Assessment and Plan


VTE prophylaxis?: Chemical, Mechanical





- Patient Problems


(1) Neuroleptic malignant syndrome


Current Visit: Yes   Status: Acute   


Plan to address problem: 


- Given catatonic state, elevated CK, AMS and antipsychotic medication use we 

will r/o malignant neuroleptic syndrome


- Neurology has been consulted








(2) Person under investigation for COVID-19


Current Visit: Yes   Status: Acute   


Plan to address problem: 


-COVID protocol initiated


- IV azithromycin and Rocephin initiated


- 8/14 COVID PCR pending


- 8/14 stat LDH, CRP, procalcitonin, d-dimer, ferritin ordered


- Droplet /contact precautions


- Supplemental oxygenation as needed


- Infectious disease consult requested


- Pulmonary hygiene


- Elevated COVID makers


- In setting of elevated D-Dimer, CTA Chest was ordered








(3) Rhabdomyolysis


Current Visit: Yes   Status: Acute   


Plan to address problem: 


- Received 2 liters IVF in ED


- IVF NS at 125ml/hr


- Trend CK levels








(4) Acute psychosis


Current Visit: Yes   Status: Acute   


Plan to address problem: 


- Currently in a catonic state


- History of paranoid schizophrenia


- Mental health consult requested








(5) Acute renal failure


Current Visit: Yes   Status: Acute   


Plan to address problem: 


- Vasomotor nephropathy maybe secondary to dehydration or rhabdomyolysis 


- Received 2 L IVF in ED


- Admit Cr/BUN 1.4/33


- Initiated on IVF


- Avoid nephrotoxic medications


- Trend BMP








(6) Leukocytosis


Current Visit: Yes   Status: Acute   


Plan to address problem: 


- Admit WBC 13.8


- Trend CBC


- Maybe secondary to possible PNA or from dehydration


- IV abx initiated








(7) Thrombocytopenia


Current Visit: Yes   Status: Acute   


Plan to address problem: 


-Admit Plt is 97


- Bleeding precautions


- Trend CBC








(8) DVT prophylaxis


Current Visit: Yes   Status: Acute   


Plan to address problem: 


- SCDs to BLE while in bed


- Heparin subq








(9) Full code status


Current Visit: Yes   Status: Acute   





<CASA ULRICH - Last Filed: 08/14/20 18:44>





History of Present Illness


Date of examination: 08/14/20


Date of admission: 


08/14/20 15:51





Chief complaint: 


AMS








Medications and Allergies


Active Meds: 


Active Medications





Acetaminophen (Tylenol)  650 mg PO Q4H PRN


   PRN Reason: Pain MILD(1-3)/Fever >100.5/HA


Heparin Sodium (Porcine) (Heparin)  5,000 unit SUB-Q Q8HR [facility]


   Last Admin: 08/14/20 11:40 Dose:  5,000 unit


   Documented by: 


Sodium Chloride (Nacl 0.9% 1000 Ml)  1,000 mls @ 125 mls/hr IV AS DIRECT KATHYA


Azithromycin 500 mg/ Sodium (Chloride)  250 mls @ 250 mls/hr IV Q24HR KATHYA; 

Protocol


   Last Admin: 08/14/20 12:49 Dose:  250 mls/hr


   Documented by: 


Ceftriaxone Sodium (Rocephin/Ns 2 Gm/100 Ml)  2 gm in 100 mls @ 200 mls/hr IV 

Q24HR KATHYA; Protocol


   Last Admin: 08/14/20 11:40 Dose:  200 mls/hr


   Documented by: 


Valproate Sodium 500 mg/ (Sodium Chloride)  105 mls @ 100 mls/hr IV Q12HR KATHYA


Ondansetron HCl (Zofran)  4 mg IV Q8H PRN


   PRN Reason: Nausea And Vomiting


Sodium Chloride (Sodium Chloride Flush Syringe 10 Ml)  10 ml IV BID [facility]


   Last Admin: 08/14/20 12:45 Dose:  10 ml


   Documented by: 


Sodium Chloride (Sodium Chloride Flush Syringe 10 Ml)  10 ml IV PRN PRN


   PRN Reason: LINE FLUSH


   Stop: 08/27/20 07:49


Trazodone HCl (Desyrel)  50 mg PO QHS [facility]











Exam





- Constitutional


Vitals: 


                                        











Temp Pulse Resp BP Pulse Ox


 


 99.9 F H  91 H  29 H  132/81   95 


 


 08/13/20 22:33  08/14/20 16:46  08/14/20 16:46  08/14/20 16:46  08/14/20 16:46














Results





- Labs


CBC & Chem 7: 


                                 08/13/20 22:44





                                 08/14/20 14:24


Labs: 


                             Laboratory Last Values











WBC  13.8 K/mm3 (4.5-11.0)  H  08/13/20  22:44    


 


RBC  5.15 M/mm3 (3.65-5.03)  H  08/13/20  22:44    


 


Hgb  15.0 gm/dl (10.1-14.3)  H  08/13/20  22:44    


 


Hct  45.0 % (30.3-42.9)  H  08/13/20  22:44    


 


MCV  87 fl (79-97)   08/13/20  22:44    


 


MCH  29 pg (28-32)   08/13/20  22:44    


 


MCHC  33 % (30-34)   08/13/20  22:44    


 


RDW  14.7 % (13.2-15.2)   08/13/20  22:44    


 


Plt Count  97 K/mm3 (140-440)  L  08/13/20  22:44    


 


Lymph % (Auto)  10.6 % (13.4-35.0)  L  08/13/20  22:44    


 


Mono % (Auto)  9.8 % (0.0-7.3)  H  08/13/20  22:44    


 


Eos % (Auto)  0.0 % (0.0-4.3)   08/13/20  22:44    


 


Baso % (Auto)  1.2 % (0.0-1.8)   08/13/20  22:44    


 


Lymph #  1.5 K/mm3 (1.2-5.4)   08/13/20  22:44    


 


Mono #  1.3 K/mm3 (0.0-0.8)  H  08/13/20  22:44    


 


Eos #  0.0 K/mm3 (0.0-0.4)   08/13/20  22:44    


 


Baso #  0.2 K/mm3 (0.0-0.1)  H  08/13/20  22:44    


 


Seg Neutrophils %  78.4 % (40.0-70.0)  H  08/13/20  22:44    


 


Seg Neutrophils #  10.8 K/mm3 (1.8-7.7)  H  08/13/20  22:44    


 


D-Dimer  > 40674 ng/mlDDU (0-234)  H  08/14/20  09:16    


 


Sodium  146 mmol/L (137-145)  H  08/14/20  14:24    


 


Potassium  4.3 mmol/L (3.6-5.0)   08/14/20  14:24    


 


Chloride  105.9 mmol/L ()   08/14/20  14:24    


 


Carbon Dioxide  22 mmol/L (22-30)   08/14/20  14:24    


 


Anion Gap  22 mmol/L  08/14/20  14:24    


 


BUN  28 mg/dL (7-17)  H  08/14/20  14:24    


 


Creatinine  1.3 mg/dL (0.6-1.2)  H  08/14/20  14:24    


 


Estimated GFR  51 ml/min  08/14/20  14:24    


 


BUN/Creatinine Ratio  22 %  08/14/20  14:24    


 


Glucose  95 mg/dL ()   08/14/20  14:24    


 


Lactic Acid  1.30 mmol/L (0.7-2.0)   08/13/20  22:44    


 


Calcium  7.9 mg/dL (8.4-10.2)  L  08/14/20  14:24    


 


Ferritin  962.2 ng/mL (10.0-200.0)  H  08/14/20  09:16    


 


Total Bilirubin  0.50 mg/dL (0.1-1.2)   08/13/20  22:44    


 


Direct Bilirubin  0.3 mg/dL (0-0.2)  H  08/13/20  22:44    


 


Indirect Bilirubin  0.2 mg/dL  08/13/20  22:44    


 


AST  766 units/L (5-40)  H  08/13/20  22:44    


 


ALT  159 units/L (7-56)  H  08/13/20  22:44    


 


Alkaline Phosphatase  49 units/L ()   08/13/20  22:44    


 


Lactate Dehydrogenase  1277 units/L ()  H  08/14/20  09:16    


 


Total Creatine Kinase  77483 units/L ()  H  08/14/20  09:35    


 


C-Reactive Protein  9.40 mg/dL (0.00-1.30)  H  08/14/20  09:16    


 


Total Protein  7.6 g/dL (6.3-8.2)   08/13/20  22:44    


 


Albumin  3.7 g/dL (3.9-5)  L  08/13/20  22:44    


 


Albumin/Globulin Ratio  0.9 %  08/13/20  22:44    


 


Procalcitonin  9.50 ng/mL (<0.15)   08/14/20  09:16    


 


Urine Color  Tiffany  (Yellow)   08/14/20  Unknown


 


Urine Turbidity  Slightly-cloudy  (Clear)   08/14/20  Unknown


 


Urine pH  5.0  (5.0-7.0)   08/14/20  Unknown


 


Ur Specific Gravity  1.026  (1.003-1.030)   08/14/20  Unknown


 


Urine Protein  100 mg/dl mg/dL (Negative)   08/14/20  Unknown


 


Urine Glucose (UA)  Neg mg/dL (Negative)   08/14/20  Unknown


 


Urine Ketones  Tr mg/dL (Negative)   08/14/20  Unknown


 


Urine Blood  Lg  (Negative)   08/14/20  Unknown


 


Urine Nitrite  Neg  (Negative)   08/14/20  Unknown


 


Urine Bilirubin  Neg  (Negative)   08/14/20  Unknown


 


Urine Urobilinogen  2.0 mg/dL (<2.0)   08/14/20  Unknown


 


Ur Leukocyte Esterase  Neg  (Negative)   08/14/20  Unknown


 


Urine WBC (Auto)  17.0 /HPF (0.0-6.0)  H  08/14/20  Unknown


 


Urine RBC (Auto)  1.0 /HPF (0.0-6.0)   08/14/20  Unknown


 


U Epithel Cells (Auto)  4.0 /HPF (0-13.0)   08/14/20  Unknown


 


Urine Mucus  2+ /HPF  08/14/20  Unknown


 


Salicylates  < 0.3 mg/dL (2.8-20.0)  L  08/13/20  22:44    


 


Urine Opiates Screen  Presumptive negative   08/14/20  Unknown


 


Urine Methadone Screen  Presumptive negative   08/14/20  Unknown


 


Acetaminophen  5.0 ug/mL (10.0-30.0)  L  08/13/20  22:44    


 


Ur Barbiturates Screen  Presumptive negative   08/14/20  Unknown


 


Ur Phencyclidine Scrn  Presumptive negative   08/14/20  Unknown


 


Ur Amphetamines Screen  Presumptive negative   08/14/20  Unknown


 


U Benzodiazepines Scrn  Presumptive negative   08/14/20  Unknown


 


Urine Cocaine Screen  Presumptive negative   08/14/20  Unknown


 


U Marijuana (THC) Screen  Presumptive negative   08/14/20  Unknown


 


Drugs of Abuse Note  Disclamer   08/14/20  Unknown


 


Plasma/Serum Alcohol  < 0.01 % (0-0.07)   08/13/20  22:44    











Microbiology: 


Microbiology





08/14/20 01:40   Peripheral/Venous   Blood Culture - Preliminary


                            Culture in Progress


08/14/20 01:59   Peripheral/Venous   Blood Culture - Preliminary


                            Culture in Progress

## 2020-08-14 NOTE — CONSULTATION
History of Present Illness


Consult date: 08/14/20


Chief complaint: 





altered mental status, abnormal movements


History of present illness: 





TELE NERUOLOGY CONSULT;








This is a 55-year-old female with paranoid schizophrenia that presents to the 

emergency department on 8/13 for altered mental status .


history of paranoid schizophrenia. .the patient has been having jerking of nick 

body and head.


CT abdomen pelvis which shows patchy peripheral groundglass opacities bilateral 

lungs which is consistent with either viral or atypical pneumonia,.


  She was found to have acute kidney injury with a creatinine of 1.4/BUN 33 as 

baseline from previous records seems to be 0.7, rhabdomyolysis with a creatinine

kinase of 33,481, leukocytosis with a WBC of 13.8, thrombocytopenia with 

platelets at 97. 





  She will be admitted to the hospitalist service as a COVID PUI given findings 

on CT with IV antibiotics, rhabdomyolysis, and WHITNEY. .


also has high CPK, high FERRITIN, high CRP, high LDH.








 Medications and Allergies 


                                    Allergies











Allergy/AdvReac Type Severity Reaction Status Date / Time


 


haloperidol [From Haldol] AdvReac  Unknown Verified 07/31/16 20:22











                                Home Medications











 Medication  Instructions  Recorded  Confirmed  Last Taken  Type


 


Benztropine [Cogentin] 1 mg PO BID 07/30/16 11/28/17 Unknown History


 


Divalproex Sodium [Depakote] 1,500 mg PO QHS 07/30/16 11/28/17 Unknown History


 


Paliperidone Palmitate [Invega 325 mg IM Q3W 07/30/16 11/28/17 Unknown History





Sustenna]     


 


Aspirin 81 mg PO QDAY #30 tablet 08/01/16 11/28/17 Unknown Rx


 


Nitrofurantoin Mono/M-Cryst 100 mg PO Q12HR #14 capsule 06/29/17 11/28/17 

Unknown Rx





[Macrobid CAP]     


 


cephALEXin [Keflex] 500 mg PO Q8HR #10 cap 11/28/17  Unknown Rx











Medications and Allergies


                                    Allergies











Allergy/AdvReac Type Severity Reaction Status Date / Time


 


haloperidol [From Haldol] AdvReac  Unknown Verified 07/31/16 20:22











                                Home Medications











 Medication  Instructions  Recorded  Confirmed  Last Taken  Type


 


Benztropine [Cogentin] 1 mg PO BID 07/30/16 11/28/17 Unknown History


 


Divalproex Sodium [Depakote] 1,500 mg PO QHS 07/30/16 11/28/17 Unknown History


 


Paliperidone Palmitate [Invega 325 mg IM Q3W 07/30/16 11/28/17 Unknown History





Sustenna]     


 


Aspirin 81 mg PO QDAY #30 tablet 08/01/16 11/28/17 Unknown Rx


 


Nitrofurantoin Mono/M-Cryst 100 mg PO Q12HR #14 capsule 06/29/17 11/28/17 

Unknown Rx





[Macrobid CAP]     


 


cephALEXin [Keflex] 500 mg PO Q8HR #10 cap 11/28/17  Unknown Rx











Active Meds: 


Active Medications





Acetaminophen (Tylenol)  650 mg PO Q4H PRN


   PRN Reason: Pain MILD(1-3)/Fever >100.5/HA


Heparin Sodium (Porcine) (Heparin)  5,000 unit SUB-Q Q8HR KATHYA


   Last Admin: 08/14/20 11:40 Dose:  5,000 unit


   Documented by: 


Sodium Chloride (Nacl 0.9% 1000 Ml)  1,000 mls @ 125 mls/hr IV AS DIRECT KATHYA


Azithromycin 500 mg/ Sodium (Chloride)  250 mls @ 250 mls/hr IV Q24HR KATHYA; 

Protocol


   Last Admin: 08/14/20 12:49 Dose:  250 mls/hr


   Documented by: 


Ceftriaxone Sodium (Rocephin/Ns 2 Gm/100 Ml)  2 gm in 100 mls @ 200 mls/hr IV 

Q24HR KATHYA; Protocol


   Last Admin: 08/14/20 11:40 Dose:  200 mls/hr


   Documented by: 


Valproate Sodium 500 mg/ (Sodium Chloride)  105 mls @ 100 mls/hr IV Q12HR KATHYA


Ondansetron HCl (Zofran)  4 mg IV Q8H PRN


   PRN Reason: Nausea And Vomiting


Sodium Chloride (Sodium Chloride Flush Syringe 10 Ml)  10 ml IV BID KATHYA


   Last Admin: 08/14/20 12:45 Dose:  10 ml


   Documented by: 


Sodium Chloride (Sodium Chloride Flush Syringe 10 Ml)  10 ml IV PRN PRN


   PRN Reason: LINE FLUSH


   Stop: 08/27/20 07:49


Trazodone HCl (Desyrel)  50 mg PO QHS KATHYA











Review of Systems


ROS unobtainable: due to mental status


All systems: negative





Physical Examination





- Vital Signs


Vital Signs: 


                                   Vital Signs











Temp Pulse Resp BP Pulse Ox


 


 99.9 F H  99 H  18   149/78   96 


 


 08/13/20 22:29  08/13/20 22:29  08/13/20 22:29  08/13/20 22:29  08/13/20 22:29














- Physical Exam


Narrative exam: 





Neurology examination:


MS- eyes closed. laying in bed. on pain- moans and localizes with both hands. 


CN- pupils reactive to light both sides by nurse.


M- motor cannot be assessed.





                         Laboratory Results - last 72 hr











  08/13/20 08/13/20 08/13/20





  22:44 22:44 22:44


 


WBC  13.8 H  


 


RBC  5.15 H  


 


Hgb  15.0 H  


 


Hct  45.0 H  


 


MCV  87  


 


MCH  29  


 


MCHC  33  


 


RDW  14.7  


 


Plt Count  97 L  


 


Lymph % (Auto)  10.6 L  


 


Mono % (Auto)  9.8 H  


 


Eos % (Auto)  0.0  


 


Baso % (Auto)  1.2  


 


Lymph #  1.5  


 


Mono #  1.3 H  


 


Eos #  0.0  


 


Baso #  0.2 H  


 


Seg Neutrophils %  78.4 H  


 


Seg Neutrophils #  10.8 H  


 


D-Dimer   


 


Sodium   140 


 


Potassium   3.6 


 


Chloride   100.9 


 


Carbon Dioxide   21 L 


 


Anion Gap   22 


 


BUN   33 H 


 


Creatinine   1.4 H 


 


Estimated GFR   47 


 


BUN/Creatinine Ratio   24 


 


Glucose   112 H 


 


Lactic Acid   


 


Calcium   8.6 


 


Ferritin   


 


Total Bilirubin   


 


Direct Bilirubin   


 


Indirect Bilirubin   


 


AST   


 


ALT   


 


Alkaline Phosphatase   


 


Lactate Dehydrogenase   


 


Total Creatine Kinase   


 


C-Reactive Protein   


 


Total Protein   


 


Albumin   


 


Albumin/Globulin Ratio   


 


Procalcitonin   


 


Urine Color   


 


Urine Turbidity   


 


Urine pH   


 


Ur Specific Gravity   


 


Urine Protein   


 


Urine Glucose (UA)   


 


Urine Ketones   


 


Urine Blood   


 


Urine Nitrite   


 


Urine Bilirubin   


 


Urine Urobilinogen   


 


Ur Leukocyte Esterase   


 


Urine WBC (Auto)   


 


Urine RBC (Auto)   


 


U Epithel Cells (Auto)   


 


Urine Mucus   


 


Salicylates    < 0.3 L


 


Urine Opiates Screen   


 


Urine Methadone Screen   


 


Acetaminophen   


 


Ur Barbiturates Screen   


 


Ur Phencyclidine Scrn   


 


Ur Amphetamines Screen   


 


U Benzodiazepines Scrn   


 


Urine Cocaine Screen   


 


U Marijuana (THC) Screen   


 


Drugs of Abuse Note   


 


Plasma/Serum Alcohol   














  08/13/20 08/13/20 08/13/20





  22:44 22:44 22:44


 


WBC   


 


RBC   


 


Hgb   


 


Hct   


 


MCV   


 


MCH   


 


MCHC   


 


RDW   


 


Plt Count   


 


Lymph % (Auto)   


 


Mono % (Auto)   


 


Eos % (Auto)   


 


Baso % (Auto)   


 


Lymph #   


 


Mono #   


 


Eos #   


 


Baso #   


 


Seg Neutrophils %   


 


Seg Neutrophils #   


 


D-Dimer   


 


Sodium   


 


Potassium   


 


Chloride   


 


Carbon Dioxide   


 


Anion Gap   


 


BUN   


 


Creatinine   


 


Estimated GFR   


 


BUN/Creatinine Ratio   


 


Glucose   


 


Lactic Acid   


 


Calcium   


 


Ferritin   


 


Total Bilirubin    0.50


 


Direct Bilirubin    0.3 H


 


Indirect Bilirubin    0.2


 


AST    766 H


 


ALT    159 H


 


Alkaline Phosphatase    49


 


Lactate Dehydrogenase   


 


Total Creatine Kinase   


 


C-Reactive Protein   


 


Total Protein    7.6


 


Albumin    3.7 L


 


Albumin/Globulin Ratio    0.9


 


Procalcitonin   


 


Urine Color   


 


Urine Turbidity   


 


Urine pH   


 


Ur Specific Gravity   


 


Urine Protein   


 


Urine Glucose (UA)   


 


Urine Ketones   


 


Urine Blood   


 


Urine Nitrite   


 


Urine Bilirubin   


 


Urine Urobilinogen   


 


Ur Leukocyte Esterase   


 


Urine WBC (Auto)   


 


Urine RBC (Auto)   


 


U Epithel Cells (Auto)   


 


Urine Mucus   


 


Salicylates   


 


Urine Opiates Screen   


 


Urine Methadone Screen   


 


Acetaminophen  5.0 L  


 


Ur Barbiturates Screen   


 


Ur Phencyclidine Scrn   


 


Ur Amphetamines Screen   


 


U Benzodiazepines Scrn   


 


Urine Cocaine Screen   


 


U Marijuana (THC) Screen   


 


Drugs of Abuse Note   


 


Plasma/Serum Alcohol   < 0.01 














  08/13/20 08/13/20 08/14/20





  22:44 22:44 09:16


 


WBC   


 


RBC   


 


Hgb   


 


Hct   


 


MCV   


 


MCH   


 


MCHC   


 


RDW   


 


Plt Count   


 


Lymph % (Auto)   


 


Mono % (Auto)   


 


Eos % (Auto)   


 


Baso % (Auto)   


 


Lymph #   


 


Mono #   


 


Eos #   


 


Baso #   


 


Seg Neutrophils %   


 


Seg Neutrophils #   


 


D-Dimer    > 28405 H


 


Sodium   


 


Potassium   


 


Chloride   


 


Carbon Dioxide   


 


Anion Gap   


 


BUN   


 


Creatinine   


 


Estimated GFR   


 


BUN/Creatinine Ratio   


 


Glucose   


 


Lactic Acid  1.30  


 


Calcium   


 


Ferritin   


 


Total Bilirubin   


 


Direct Bilirubin   


 


Indirect Bilirubin   


 


AST   


 


ALT   


 


Alkaline Phosphatase   


 


Lactate Dehydrogenase   


 


Total Creatine Kinase   94616 H 


 


C-Reactive Protein   


 


Total Protein   


 


Albumin   


 


Albumin/Globulin Ratio   


 


Procalcitonin   


 


Urine Color   


 


Urine Turbidity   


 


Urine pH   


 


Ur Specific Gravity   


 


Urine Protein   


 


Urine Glucose (UA)   


 


Urine Ketones   


 


Urine Blood   


 


Urine Nitrite   


 


Urine Bilirubin   


 


Urine Urobilinogen   


 


Ur Leukocyte Esterase   


 


Urine WBC (Auto)   


 


Urine RBC (Auto)   


 


U Epithel Cells (Auto)   


 


Urine Mucus   


 


Salicylates   


 


Urine Opiates Screen   


 


Urine Methadone Screen   


 


Acetaminophen   


 


Ur Barbiturates Screen   


 


Ur Phencyclidine Scrn   


 


Ur Amphetamines Screen   


 


U Benzodiazepines Scrn   


 


Urine Cocaine Screen   


 


U Marijuana (THC) Screen   


 


Drugs of Abuse Note   


 


Plasma/Serum Alcohol   














  08/14/20 08/14/20 08/14/20





  09:16 09:16 09:16


 


WBC   


 


RBC   


 


Hgb   


 


Hct   


 


MCV   


 


MCH   


 


MCHC   


 


RDW   


 


Plt Count   


 


Lymph % (Auto)   


 


Mono % (Auto)   


 


Eos % (Auto)   


 


Baso % (Auto)   


 


Lymph #   


 


Mono #   


 


Eos #   


 


Baso #   


 


Seg Neutrophils %   


 


Seg Neutrophils #   


 


D-Dimer   


 


Sodium   


 


Potassium   


 


Chloride   


 


Carbon Dioxide   


 


Anion Gap   


 


BUN   


 


Creatinine   


 


Estimated GFR   


 


BUN/Creatinine Ratio   


 


Glucose   


 


Lactic Acid   


 


Calcium   


 


Ferritin  962.2 H  


 


Total Bilirubin   


 


Direct Bilirubin   


 


Indirect Bilirubin   


 


AST   


 


ALT   


 


Alkaline Phosphatase   


 


Lactate Dehydrogenase   1277 H 


 


Total Creatine Kinase   


 


C-Reactive Protein   9.40 H 


 


Total Protein   


 


Albumin   


 


Albumin/Globulin Ratio   


 


Procalcitonin    9.50


 


Urine Color   


 


Urine Turbidity   


 


Urine pH   


 


Ur Specific Gravity   


 


Urine Protein   


 


Urine Glucose (UA)   


 


Urine Ketones   


 


Urine Blood   


 


Urine Nitrite   


 


Urine Bilirubin   


 


Urine Urobilinogen   


 


Ur Leukocyte Esterase   


 


Urine WBC (Auto)   


 


Urine RBC (Auto)   


 


U Epithel Cells (Auto)   


 


Urine Mucus   


 


Salicylates   


 


Urine Opiates Screen   


 


Urine Methadone Screen   


 


Acetaminophen   


 


Ur Barbiturates Screen   


 


Ur Phencyclidine Scrn   


 


Ur Amphetamines Screen   


 


U Benzodiazepines Scrn   


 


Urine Cocaine Screen   


 


U Marijuana (THC) Screen   


 


Drugs of Abuse Note   


 


Plasma/Serum Alcohol   














  08/14/20 08/14/20 08/14/20





  09:35 14:24 Unknown


 


WBC   


 


RBC   


 


Hgb   


 


Hct   


 


MCV   


 


MCH   


 


MCHC   


 


RDW   


 


Plt Count   


 


Lymph % (Auto)   


 


Mono % (Auto)   


 


Eos % (Auto)   


 


Baso % (Auto)   


 


Lymph #   


 


Mono #   


 


Eos #   


 


Baso #   


 


Seg Neutrophils %   


 


Seg Neutrophils #   


 


D-Dimer   


 


Sodium   146 H 


 


Potassium   4.3 


 


Chloride   105.9 


 


Carbon Dioxide   22 


 


Anion Gap   22 


 


BUN   28 H 


 


Creatinine   1.3 H 


 


Estimated GFR   51 


 


BUN/Creatinine Ratio   22 


 


Glucose   95 


 


Lactic Acid   


 


Calcium   7.9 L 


 


Ferritin   


 


Total Bilirubin   


 


Direct Bilirubin   


 


Indirect Bilirubin   


 


AST   


 


ALT   


 


Alkaline Phosphatase   


 


Lactate Dehydrogenase   


 


Total Creatine Kinase  67062 H  


 


C-Reactive Protein   


 


Total Protein   


 


Albumin   


 


Albumin/Globulin Ratio   


 


Procalcitonin   


 


Urine Color    Tiffany


 


Urine Turbidity    Slightly-cloudy


 


Urine pH    5.0


 


Ur Specific Gravity    1.026


 


Urine Protein    100 mg/dl


 


Urine Glucose (UA)    Neg


 


Urine Ketones    Tr


 


Urine Blood    Lg


 


Urine Nitrite    Neg


 


Urine Bilirubin    Neg


 


Urine Urobilinogen    2.0


 


Ur Leukocyte Esterase    Neg


 


Urine WBC (Auto)    17.0 H


 


Urine RBC (Auto)    1.0


 


U Epithel Cells (Auto)    4.0


 


Urine Mucus    2+


 


Salicylates   


 


Urine Opiates Screen   


 


Urine Methadone Screen   


 


Acetaminophen   


 


Ur Barbiturates Screen   


 


Ur Phencyclidine Scrn   


 


Ur Amphetamines Screen   


 


U Benzodiazepines Scrn   


 


Urine Cocaine Screen   


 


U Marijuana (THC) Screen   


 


Drugs of Abuse Note   


 


Plasma/Serum Alcohol   














  08/14/20





  Unknown


 


WBC 


 


RBC 


 


Hgb 


 


Hct 


 


MCV 


 


MCH 


 


MCHC 


 


RDW 


 


Plt Count 


 


Lymph % (Auto) 


 


Mono % (Auto) 


 


Eos % (Auto) 


 


Baso % (Auto) 


 


Lymph # 


 


Mono # 


 


Eos # 


 


Baso # 


 


Seg Neutrophils % 


 


Seg Neutrophils # 


 


D-Dimer 


 


Sodium 


 


Potassium 


 


Chloride 


 


Carbon Dioxide 


 


Anion Gap 


 


BUN 


 


Creatinine 


 


Estimated GFR 


 


BUN/Creatinine Ratio 


 


Glucose 


 


Lactic Acid 


 


Calcium 


 


Ferritin 


 


Total Bilirubin 


 


Direct Bilirubin 


 


Indirect Bilirubin 


 


AST 


 


ALT 


 


Alkaline Phosphatase 


 


Lactate Dehydrogenase 


 


Total Creatine Kinase 


 


C-Reactive Protein 


 


Total Protein 


 


Albumin 


 


Albumin/Globulin Ratio 


 


Procalcitonin 


 


Urine Color 


 


Urine Turbidity 


 


Urine pH 


 


Ur Specific Gravity 


 


Urine Protein 


 


Urine Glucose (UA) 


 


Urine Ketones 


 


Urine Blood 


 


Urine Nitrite 


 


Urine Bilirubin 


 


Urine Urobilinogen 


 


Ur Leukocyte Esterase 


 


Urine WBC (Auto) 


 


Urine RBC (Auto) 


 


U Epithel Cells (Auto) 


 


Urine Mucus 


 


Salicylates 


 


Urine Opiates Screen  Presumptive negative


 


Urine Methadone Screen  Presumptive negative


 


Acetaminophen 


 


Ur Barbiturates Screen  Presumptive negative


 


Ur Phencyclidine Scrn  Presumptive negative


 


Ur Amphetamines Screen  Presumptive negative


 


U Benzodiazepines Scrn  Presumptive negative


 


Urine Cocaine Screen  Presumptive negative


 


U Marijuana (THC) Screen  Presumptive negative


 


Drugs of Abuse Note  Disclamer


 


Plasma/Serum Alcohol 














Results





- Laboratory Findings


CBC and BMP: 


                                 08/13/20 22:44





                                 08/14/20 14:24


Abnormal Lab Findings: 


                                  Abnormal Labs











  08/13/20 08/13/20 08/13/20





  22:44 22:44 22:44


 


WBC  13.8 H  


 


RBC  5.15 H  


 


Hgb  15.0 H  


 


Hct  45.0 H  


 


Plt Count  97 L  


 


Lymph % (Auto)  10.6 L  


 


Mono % (Auto)  9.8 H  


 


Mono #  1.3 H  


 


Baso #  0.2 H  


 


Seg Neutrophils %  78.4 H  


 


Seg Neutrophils #  10.8 H  


 


D-Dimer   


 


Sodium   


 


Carbon Dioxide   21 L 


 


BUN   33 H 


 


Creatinine   1.4 H 


 


Glucose   112 H 


 


Calcium   


 


Ferritin   


 


Direct Bilirubin   


 


AST   


 


ALT   


 


Lactate Dehydrogenase   


 


Total Creatine Kinase   


 


C-Reactive Protein   


 


Albumin   


 


Urine WBC (Auto)   


 


Salicylates    < 0.3 L


 


Acetaminophen   














  08/13/20 08/13/20 08/13/20





  22:44 22:44 22:44


 


WBC   


 


RBC   


 


Hgb   


 


Hct   


 


Plt Count   


 


Lymph % (Auto)   


 


Mono % (Auto)   


 


Mono #   


 


Baso #   


 


Seg Neutrophils %   


 


Seg Neutrophils #   


 


D-Dimer   


 


Sodium   


 


Carbon Dioxide   


 


BUN   


 


Creatinine   


 


Glucose   


 


Calcium   


 


Ferritin   


 


Direct Bilirubin   0.3 H 


 


AST   766 H 


 


ALT   159 H 


 


Lactate Dehydrogenase   


 


Total Creatine Kinase    10896 H


 


C-Reactive Protein   


 


Albumin   3.7 L 


 


Urine WBC (Auto)   


 


Salicylates   


 


Acetaminophen  5.0 L  














  08/14/20 08/14/20 08/14/20





  09:16 09:16 09:16


 


WBC   


 


RBC   


 


Hgb   


 


Hct   


 


Plt Count   


 


Lymph % (Auto)   


 


Mono % (Auto)   


 


Mono #   


 


Baso #   


 


Seg Neutrophils %   


 


Seg Neutrophils #   


 


D-Dimer  > 91970 H  


 


Sodium   


 


Carbon Dioxide   


 


BUN   


 


Creatinine   


 


Glucose   


 


Calcium   


 


Ferritin   962.2 H 


 


Direct Bilirubin   


 


AST   


 


ALT   


 


Lactate Dehydrogenase    1277 H


 


Total Creatine Kinase   


 


C-Reactive Protein    9.40 H


 


Albumin   


 


Urine WBC (Auto)   


 


Salicylates   


 


Acetaminophen   














  08/14/20 08/14/20 08/14/20





  09:35 14:24 Unknown


 


WBC   


 


RBC   


 


Hgb   


 


Hct   


 


Plt Count   


 


Lymph % (Auto)   


 


Mono % (Auto)   


 


Mono #   


 


Baso #   


 


Seg Neutrophils %   


 


Seg Neutrophils #   


 


D-Dimer   


 


Sodium   146 H 


 


Carbon Dioxide   


 


BUN   28 H 


 


Creatinine   1.3 H 


 


Glucose   


 


Calcium   7.9 L 


 


Ferritin   


 


Direct Bilirubin   


 


AST   


 


ALT   


 


Lactate Dehydrogenase   


 


Total Creatine Kinase  86450 H  


 


C-Reactive Protein   


 


Albumin   


 


Urine WBC (Auto)    17.0 H


 


Salicylates   


 


Acetaminophen   














Assessment and Plan








This is a 55-year-old female with paranoid schizophrenia that presents to the 

emergency department on 8/13 for altered mental status .


history of paranoid schizophrenia. .the patient has been having jerking of nick 

body and head.


CT abdomen pelvis which shows patchy peripheral groundglass opacities bilateral 

lungs which is consistent with either viral or atypical pneumonia,.


  She was found to have acute kidney injury with a creatinine of 1.4/BUN 33 as 

baseline from previous records seems to be 0.7, rhabdomyolysis with a creatinine

 kinase of 33,481, leukocytosis with a WBC of 13.8, thrombocytopenia with 

platelets at 97. 





  She will be admitted to the hospitalist service as a COVID PUI given findings 

on CT with IV antibiotics, rhabdomyolysis, and WHITNEY. .


also has high CPK, high FERRITIN, high CRP, high LDH.








- altered mental status secondary to sepsis, metabolic etiology. suspecetd COVID

 pneumonia. 


has h/o paranoid schizophrenia. 


not sure about nms at thsi time.





plan-


cont. Rx for sepsis and metabolic etiology.


Ct brain w out .


thanks.

## 2020-08-14 NOTE — CONSULTATION
History of Present Illness





- Reason for Consult


Consult date: 08/14/20


R/O COVID


Requesting physician: PADMA AMES





- History of Present Illness


55 years old female with history of paranoid schizophrenia, admitted on 

8/13/2020 due to altered mental status and catatonic state.  Patient was 

nonverbal in the ED, unable to provide history.  On arrival, temperature 99.9, 

HR 99, RR 18, O2 sat 96%, /78.  Initial WBC 13.8.  Hemoglobin 15.  

Platelets 97.  D-dimer 10,000.  Creatinine 1.4.  .    CRP 9.4.  CK

33,000 4/81.  LDH 1277.  CT of the abdomen shows bibasilar patchy groundglass 

opacity.  Urine drug screen negative.  Alcohol level nontoxic.  Acetaminophen 

level nontoxic.








Review of Systems: reviewed ED and H&P notes. Limited due to PPE conservation 

strategy  


 

















Medications and Allergies


                                    Allergies











Allergy/AdvReac Type Severity Reaction Status Date / Time


 


haloperidol [From Haldol] AdvReac  Unknown Verified 07/31/16 20:22











                                Home Medications











 Medication  Instructions  Recorded  Confirmed  Last Taken  Type


 


Benztropine [Cogentin] 1 mg PO BID 07/30/16 11/28/17 Unknown History


 


Divalproex Sodium [Depakote] 1,500 mg PO QHS 07/30/16 11/28/17 Unknown History


 


Paliperidone Palmitate [Invega 325 mg IM Q3W 07/30/16 11/28/17 Unknown History





Sustenna]     


 


Aspirin 81 mg PO QDAY #30 tablet 08/01/16 11/28/17 Unknown Rx


 


Nitrofurantoin Mono/M-Cryst 100 mg PO Q12HR #14 capsule 06/29/17 11/28/17 

Unknown Rx





[Macrobid CAP]     


 


cephALEXin [Keflex] 500 mg PO Q8HR #10 cap 11/28/17  Unknown Rx











Active Meds: 


Active Medications





Acetaminophen (Tylenol)  650 mg PO Q4H PRN


   PRN Reason: Pain MILD(1-3)/Fever >100.5/HA


Heparin Sodium (Porcine) (Heparin)  5,000 unit SUB-Q Q8HR KATHYA


   Last Admin: 08/14/20 11:40 Dose:  5,000 unit


   Documented by: 


Sodium Chloride (Nacl 0.9% 1000 Ml)  1,000 mls @ 125 mls/hr IV AS DIRECT KATHYA


Azithromycin 500 mg/ Sodium (Chloride)  250 mls @ 250 mls/hr IV Q24HR [facility]; 

Protocol


   Last Admin: 08/14/20 12:49 Dose:  250 mls/hr


   Documented by: 


Ceftriaxone Sodium (Rocephin/Ns 2 Gm/100 Ml)  2 gm in 100 mls @ 200 mls/hr IV 

Q24HR KATHYA; Protocol


   Last Admin: 08/14/20 11:40 Dose:  200 mls/hr


   Documented by: 


Valproate Sodium 500 mg/ (Sodium Chloride)  105 mls @ 100 mls/hr IV Q12HR KATHYA


Ondansetron HCl (Zofran)  4 mg IV Q8H PRN


   PRN Reason: Nausea And Vomiting


Sodium Chloride (Sodium Chloride Flush Syringe 10 Ml)  10 ml IV BID [facility]


   Last Admin: 08/14/20 12:45 Dose:  10 ml


   Documented by: 


Sodium Chloride (Sodium Chloride Flush Syringe 10 Ml)  10 ml IV PRN PRN


   PRN Reason: LINE FLUSH


   Stop: 08/27/20 07:49


Trazodone HCl (Desyrel)  50 mg PO QHS [facility]











Physical Examination





- Physical Exam


Narrative exam: 





Physical Exam: reviewed ED and hospitalist notes, limited due to conservation of

 PPE


General appearance:  limited due to conservation of PPE


Eyes: limited due to conservation of PPE


HENT: Atraumatic;  limited due to conservation of PPE


Lungs:  limited due to conservation of PPE


CV:  limited due to conservation of PPE


Abdomen: limited due to conservation of PPE


Extremities:  limited due to conservation of PPE


Skin:  limited due to conservation of PPE


Psych: limited due to conservation of PPE


Neuro:  limited due to conservation of PPE








- Constitutional


Vitals: 


                                   Vital Signs











Temp Pulse Resp BP Pulse Ox


 


 99.9 F H  104 H  17   132/81   96 


 


 08/13/20 22:33  08/14/20 13:00  08/14/20 13:00  08/14/20 13:00  08/14/20 13:00








                           Temperature -Last 24 Hours











Temperature                    99.9 F


 


Temperature                    99.9 F

















Results





- Labs


CBC & Chem 7: 


                                 08/13/20 22:44





                                 08/14/20 14:24


Labs: 


                              Abnormal lab results











  08/13/20 08/13/20 08/13/20 Range/Units





  22:44 22:44 22:44 


 


WBC  13.8 H    (4.5-11.0)  K/mm3


 


RBC  5.15 H    (3.65-5.03)  M/mm3


 


Hgb  15.0 H    (10.1-14.3)  gm/dl


 


Hct  45.0 H    (30.3-42.9)  %


 


Plt Count  97 L    (140-440)  K/mm3


 


Lymph % (Auto)  10.6 L    (13.4-35.0)  %


 


Mono % (Auto)  9.8 H    (0.0-7.3)  %


 


Mono #  1.3 H    (0.0-0.8)  K/mm3


 


Baso #  0.2 H    (0.0-0.1)  K/mm3


 


Seg Neutrophils %  78.4 H    (40.0-70.0)  %


 


Seg Neutrophils #  10.8 H    (1.8-7.7)  K/mm3


 


D-Dimer     (0-234)  ng/mlDDU


 


Sodium     (137-145)  mmol/L


 


Carbon Dioxide   21 L   (22-30)  mmol/L


 


BUN   33 H   (7-17)  mg/dL


 


Creatinine   1.4 H   (0.6-1.2)  mg/dL


 


Glucose   112 H   ()  mg/dL


 


Calcium     (8.4-10.2)  mg/dL


 


Ferritin     (10.0-200.0)  ng/mL


 


Direct Bilirubin     (0-0.2)  mg/dL


 


AST     (5-40)  units/L


 


ALT     (7-56)  units/L


 


Lactate Dehydrogenase     ()  units/L


 


Total Creatine Kinase     ()  units/L


 


C-Reactive Protein     (0.00-1.30)  mg/dL


 


Albumin     (3.9-5)  g/dL


 


Urine WBC (Auto)     (0.0-6.0)  /HPF


 


Salicylates    < 0.3 L  (2.8-20.0)  mg/dL


 


Acetaminophen     (10.0-30.0)  ug/mL














  08/13/20 08/13/20 08/13/20 Range/Units





  22:44 22:44 22:44 


 


WBC     (4.5-11.0)  K/mm3


 


RBC     (3.65-5.03)  M/mm3


 


Hgb     (10.1-14.3)  gm/dl


 


Hct     (30.3-42.9)  %


 


Plt Count     (140-440)  K/mm3


 


Lymph % (Auto)     (13.4-35.0)  %


 


Mono % (Auto)     (0.0-7.3)  %


 


Mono #     (0.0-0.8)  K/mm3


 


Baso #     (0.0-0.1)  K/mm3


 


Seg Neutrophils %     (40.0-70.0)  %


 


Seg Neutrophils #     (1.8-7.7)  K/mm3


 


D-Dimer     (0-234)  ng/mlDDU


 


Sodium     (137-145)  mmol/L


 


Carbon Dioxide     (22-30)  mmol/L


 


BUN     (7-17)  mg/dL


 


Creatinine     (0.6-1.2)  mg/dL


 


Glucose     ()  mg/dL


 


Calcium     (8.4-10.2)  mg/dL


 


Ferritin     (10.0-200.0)  ng/mL


 


Direct Bilirubin   0.3 H   (0-0.2)  mg/dL


 


AST   766 H   (5-40)  units/L


 


ALT   159 H   (7-56)  units/L


 


Lactate Dehydrogenase     ()  units/L


 


Total Creatine Kinase    40307 H  ()  units/L


 


C-Reactive Protein     (0.00-1.30)  mg/dL


 


Albumin   3.7 L   (3.9-5)  g/dL


 


Urine WBC (Auto)     (0.0-6.0)  /HPF


 


Salicylates     (2.8-20.0)  mg/dL


 


Acetaminophen  5.0 L    (10.0-30.0)  ug/mL














  08/14/20 08/14/20 08/14/20 Range/Units





  09:16 09:16 09:16 


 


WBC     (4.5-11.0)  K/mm3


 


RBC     (3.65-5.03)  M/mm3


 


Hgb     (10.1-14.3)  gm/dl


 


Hct     (30.3-42.9)  %


 


Plt Count     (140-440)  K/mm3


 


Lymph % (Auto)     (13.4-35.0)  %


 


Mono % (Auto)     (0.0-7.3)  %


 


Mono #     (0.0-0.8)  K/mm3


 


Baso #     (0.0-0.1)  K/mm3


 


Seg Neutrophils %     (40.0-70.0)  %


 


Seg Neutrophils #     (1.8-7.7)  K/mm3


 


D-Dimer  > 34235 H    (0-234)  ng/mlDDU


 


Sodium     (137-145)  mmol/L


 


Carbon Dioxide     (22-30)  mmol/L


 


BUN     (7-17)  mg/dL


 


Creatinine     (0.6-1.2)  mg/dL


 


Glucose     ()  mg/dL


 


Calcium     (8.4-10.2)  mg/dL


 


Ferritin   962.2 H   (10.0-200.0)  ng/mL


 


Direct Bilirubin     (0-0.2)  mg/dL


 


AST     (5-40)  units/L


 


ALT     (7-56)  units/L


 


Lactate Dehydrogenase    1277 H  ()  units/L


 


Total Creatine Kinase     ()  units/L


 


C-Reactive Protein    9.40 H  (0.00-1.30)  mg/dL


 


Albumin     (3.9-5)  g/dL


 


Urine WBC (Auto)     (0.0-6.0)  /HPF


 


Salicylates     (2.8-20.0)  mg/dL


 


Acetaminophen     (10.0-30.0)  ug/mL














  08/14/20 08/14/20 08/14/20 Range/Units





  09:35 14:24 Unknown 


 


WBC     (4.5-11.0)  K/mm3


 


RBC     (3.65-5.03)  M/mm3


 


Hgb     (10.1-14.3)  gm/dl


 


Hct     (30.3-42.9)  %


 


Plt Count     (140-440)  K/mm3


 


Lymph % (Auto)     (13.4-35.0)  %


 


Mono % (Auto)     (0.0-7.3)  %


 


Mono #     (0.0-0.8)  K/mm3


 


Baso #     (0.0-0.1)  K/mm3


 


Seg Neutrophils %     (40.0-70.0)  %


 


Seg Neutrophils #     (1.8-7.7)  K/mm3


 


D-Dimer     (0-234)  ng/mlDDU


 


Sodium   146 H   (137-145)  mmol/L


 


Carbon Dioxide     (22-30)  mmol/L


 


BUN   28 H   (7-17)  mg/dL


 


Creatinine   1.3 H   (0.6-1.2)  mg/dL


 


Glucose     ()  mg/dL


 


Calcium   7.9 L   (8.4-10.2)  mg/dL


 


Ferritin     (10.0-200.0)  ng/mL


 


Direct Bilirubin     (0-0.2)  mg/dL


 


AST     (5-40)  units/L


 


ALT     (7-56)  units/L


 


Lactate Dehydrogenase     ()  units/L


 


Total Creatine Kinase  12391 H    ()  units/L


 


C-Reactive Protein     (0.00-1.30)  mg/dL


 


Albumin     (3.9-5)  g/dL


 


Urine WBC (Auto)    17.0 H  (0.0-6.0)  /HPF


 


Salicylates     (2.8-20.0)  mg/dL


 


Acetaminophen     (10.0-30.0)  ug/mL














Assessment and Plan





Cultures:


Blood culture pending


COVID PCR pending


 


Assessment: 55 years old female with history of paranoid schizophrenia, admitted

 on 8/13/2020 due to altered mental status and catatonia state:





#Severe sepsis: Present on admission with low-grade fever, tachycardia, elevated

 leukocytosis, WHITNEY, likely due to bilateral pneumonia. 





#Bilateral pneumonia: Should rule out COVID-19 pneumonia versus community-

acquired pneumonia.  Inflammatory markers are elevated, with a d-dimer > 10,000.

  Should rule out pulmonary embolism.





#Acute hypoxemic respiratory failure:  





#Elevated LFTs: from COVID





#WHITNEY:  from COVID





#Elevated LFTs: Very high likely secondary to rhabdomyolysis





#Elevated CK: Likely secondary to rhabdomyolysis possible due to COVID-19 

infection or catatonic state





#Paranoid schizophrenia with catatonia: Per psych





Recommendations:


-Obtain CTA of the chest to eval for pulmonary embolism


-Follow COVID-19 PCR


-No indication for dexamethasone as patient is no hypoxic


-If patient COVID-19 PCR is positive and patient is hypoxic will start 

dexamethasone, if hypoxia worsens requiring > then will consider start 

Remdesivir


-Continue ceftriaxone and azithromycin, procalcitonin 9.5, likely due to WHITNEY, 

will monitor


-Continue anticoagulation per protocol


 


Dr. Orona will be covering the weekend, Dr. Coppola will be rounding on Monday.





Will follow





Marugerite Hawkins MD


Infectious Diseases Consultant 


[facility] ([facility])


M [phone]


O [phone]

## 2020-08-14 NOTE — CAT SCAN REPORT
CT abdomen pelvis wo con 



INDICATION / CLINICAL INFORMATION:

Patient complains of abdominal pain..



TECHNIQUE:

All CT scans at this location are performed using CT dose reduction for ALARA by means of automated e
xposure control. 



COMPARISON:

None available.



FINDINGS:

Patchy peripheral groundglass opacity is seen in both lungs. No free fluid is seen in the abdomen. Th
e liver, spleen, kidneys, pancreas, adrenal glands and great vessels are normal. There is a posterior
 lateral hernia of fat on the left



The pelvis, no free fluid is seen. Enlarged fibroid uterus is present. No enlarged lymph nodes are id
entified. The bladder and the appendix are normal. No significant skeletal abnormality is seen.



IMPRESSION:



1. Patchy peripheral groundglass opacity in both lungs consistent with either viral or atypical pneum
onia

2. Posterior lateral hernia of fat in the left midabdomen

3. Enlarged fibroid uterus



Signer Name: Alex Milton MD FACR 

Signed: 8/14/2020 3:49 AM

Workstation Name: [id number]

## 2020-08-14 NOTE — CONSULTATION
History of Present Illness





- Reason for Consult


Consult date: 08/14/20


Reason for consult: AMS, Not speaking





- History of Present Psychiatric Illness


Analisa Mota is a 56y/o female patient who came from [facility] with a history 

of paranoid schizophrenia that presents to the emergency department on 8/13 for 

altered mental status in a catatonic state, per medical record. I attempted to 

interview the patient, she was lying in bed awake. She is being changed by 

nursing staff. She did not respond to questioning. 





PAST PSYCHIATRIC HISTORY:


Unable to obtain due to patient's factors 





PAST MEDICAL HISTORY: Unable to obtain





Family Psychiatric History: None reported or documented





SOCIAL HISTORY


Unable to obtain





REVIEW OF SYSTEMS


Unable to assess due to patient factors





MENTAL STATUS EXAMINATION


Unable to obtain





 ASSESSMENT 


Schizoaffective disorder





RECOMMENDATIONS


MEDICATIONS


     Valproic Sodium 500mg IV q12 hours


     Lorazepam 2mg IM x 1 to assist in r/o catatonia


     Start Trazodone 50mg po qhs


Risks, benefits and alternatives of medications discussed with the patient, 

questions answered and consent obtained from patient.


PSYCHOTHERAPY: Supportive psychotherapy provided


MEDICAL: Per primary team


DELIRIUM PRECAUTIONS: Please re-orient patient frequently, keep lights on during

the day, and minimize benzodiazepines and opiates as these medications could 

worsen patient's confusion.


SAFETY SITTER: per medical team


DISPOSITION: The patient meets the criteria for acute inpatient psychiatric 

hospitalization at this time. She may transfer to an acute facility once 

medically cleared


LEGAL STATUS: Involuntary


Will continue to follow 


Thank you for the consult. Please contact with any questions and/or concerns.











Medications and Allergies


                                    Allergies











Allergy/AdvReac Type Severity Reaction Status Date / Time


 


haloperidol [From Haldol] AdvReac  Unknown Verified 07/31/16 20:22











                                Home Medications











 Medication  Instructions  Recorded  Confirmed  Last Taken  Type


 


Benztropine [Cogentin] 1 mg PO BID 07/30/16 11/28/17 Unknown History


 


Divalproex Sodium [Depakote] 1,500 mg PO QHS 07/30/16 11/28/17 Unknown History


 


Paliperidone Palmitate [Invega 325 mg IM Q3W 07/30/16 11/28/17 Unknown History





Sustenna]     


 


Aspirin 81 mg PO QDAY #30 tablet 08/01/16 11/28/17 Unknown Rx


 


Nitrofurantoin Mono/M-Cryst 100 mg PO Q12HR #14 capsule 06/29/17 11/28/17 

Unknown Rx





[Macrobid CAP]     


 


cephALEXin [Keflex] 500 mg PO Q8HR #10 cap 11/28/17  Unknown Rx











Active Meds: 


Active Medications





Acetaminophen (Tylenol)  650 mg PO Q4H PRN


   PRN Reason: Pain MILD(1-3)/Fever >100.5/HA


Heparin Sodium (Porcine) (Heparin)  5,000 unit SUB-Q Q8HR KATHYA


Sodium Chloride (Nacl 0.9% 1000 Ml)  1,000 mls @ 125 mls/hr IV AS DIRECT KATHYA


Azithromycin 500 mg/ Sodium (Chloride)  250 mls @ 250 mls/hr IV Q24HR [facility]; 

Protocol


Ceftriaxone Sodium (Rocephin/Ns 2 Gm/100 Ml)  2 gm in 100 mls @ 200 mls/hr IV 

Q24HR [facility]; Protocol


Ondansetron HCl (Zofran)  4 mg IV Q8H PRN


   PRN Reason: Nausea And Vomiting


Sodium Chloride (Sodium Chloride Flush Syringe 10 Ml)  10 ml IV BID KATHYA


Sodium Chloride (Sodium Chloride Flush Syringe 10 Ml)  10 ml IV PRN PRN


   PRN Reason: LINE FLUSH


   Stop: 08/27/20 07:49











Mental Status Exam





- Vital signs


                                Last Vital Signs











Temp  99.9 F H  08/13/20 22:33


 


Pulse  100 H  08/14/20 06:31


 


Resp  19   08/14/20 06:31


 


BP  114/93   08/14/20 06:31


 


Pulse Ox  95   08/14/20 06:31














Results


Result Diagrams: 


                                 08/13/20 22:44





                                 08/13/20 22:44


                              Abnormal lab results











  08/13/20 08/13/20 08/13/20 Range/Units





  22:44 22:44 22:44 


 


WBC  13.8 H    (4.5-11.0)  K/mm3


 


RBC  5.15 H    (3.65-5.03)  M/mm3


 


Hgb  15.0 H    (10.1-14.3)  gm/dl


 


Hct  45.0 H    (30.3-42.9)  %


 


Plt Count  97 L    (140-440)  K/mm3


 


Lymph % (Auto)  10.6 L    (13.4-35.0)  %


 


Mono % (Auto)  9.8 H    (0.0-7.3)  %


 


Mono #  1.3 H    (0.0-0.8)  K/mm3


 


Baso #  0.2 H    (0.0-0.1)  K/mm3


 


Seg Neutrophils %  78.4 H    (40.0-70.0)  %


 


Seg Neutrophils #  10.8 H    (1.8-7.7)  K/mm3


 


D-Dimer     (0-234)  ng/mlDDU


 


Carbon Dioxide   21 L   (22-30)  mmol/L


 


BUN   33 H   (7-17)  mg/dL


 


Creatinine   1.4 H   (0.6-1.2)  mg/dL


 


Glucose   112 H   ()  mg/dL


 


Ferritin     (10.0-200.0)  ng/mL


 


Direct Bilirubin     (0-0.2)  mg/dL


 


AST     (5-40)  units/L


 


ALT     (7-56)  units/L


 


Lactate Dehydrogenase     ()  units/L


 


Total Creatine Kinase     ()  units/L


 


C-Reactive Protein     (0.00-1.30)  mg/dL


 


Albumin     (3.9-5)  g/dL


 


Urine WBC (Auto)     (0.0-6.0)  /HPF


 


Salicylates    < 0.3 L  (2.8-20.0)  mg/dL


 


Acetaminophen     (10.0-30.0)  ug/mL














  08/13/20 08/13/20 08/13/20 Range/Units





  22:44 22:44 22:44 


 


WBC     (4.5-11.0)  K/mm3


 


RBC     (3.65-5.03)  M/mm3


 


Hgb     (10.1-14.3)  gm/dl


 


Hct     (30.3-42.9)  %


 


Plt Count     (140-440)  K/mm3


 


Lymph % (Auto)     (13.4-35.0)  %


 


Mono % (Auto)     (0.0-7.3)  %


 


Mono #     (0.0-0.8)  K/mm3


 


Baso #     (0.0-0.1)  K/mm3


 


Seg Neutrophils %     (40.0-70.0)  %


 


Seg Neutrophils #     (1.8-7.7)  K/mm3


 


D-Dimer     (0-234)  ng/mlDDU


 


Carbon Dioxide     (22-30)  mmol/L


 


BUN     (7-17)  mg/dL


 


Creatinine     (0.6-1.2)  mg/dL


 


Glucose     ()  mg/dL


 


Ferritin     (10.0-200.0)  ng/mL


 


Direct Bilirubin   0.3 H   (0-0.2)  mg/dL


 


AST   766 H   (5-40)  units/L


 


ALT   159 H   (7-56)  units/L


 


Lactate Dehydrogenase     ()  units/L


 


Total Creatine Kinase    76729 H  ()  units/L


 


C-Reactive Protein     (0.00-1.30)  mg/dL


 


Albumin   3.7 L   (3.9-5)  g/dL


 


Urine WBC (Auto)     (0.0-6.0)  /HPF


 


Salicylates     (2.8-20.0)  mg/dL


 


Acetaminophen  5.0 L    (10.0-30.0)  ug/mL














  08/14/20 08/14/20 08/14/20 Range/Units





  09:16 09:16 09:16 


 


WBC     (4.5-11.0)  K/mm3


 


RBC     (3.65-5.03)  M/mm3


 


Hgb     (10.1-14.3)  gm/dl


 


Hct     (30.3-42.9)  %


 


Plt Count     (140-440)  K/mm3


 


Lymph % (Auto)     (13.4-35.0)  %


 


Mono % (Auto)     (0.0-7.3)  %


 


Mono #     (0.0-0.8)  K/mm3


 


Baso #     (0.0-0.1)  K/mm3


 


Seg Neutrophils %     (40.0-70.0)  %


 


Seg Neutrophils #     (1.8-7.7)  K/mm3


 


D-Dimer  > 71078 H    (0-234)  ng/mlDDU


 


Carbon Dioxide     (22-30)  mmol/L


 


BUN     (7-17)  mg/dL


 


Creatinine     (0.6-1.2)  mg/dL


 


Glucose     ()  mg/dL


 


Ferritin   962.2 H   (10.0-200.0)  ng/mL


 


Direct Bilirubin     (0-0.2)  mg/dL


 


AST     (5-40)  units/L


 


ALT     (7-56)  units/L


 


Lactate Dehydrogenase    1277 H  ()  units/L


 


Total Creatine Kinase     ()  units/L


 


C-Reactive Protein    9.40 H  (0.00-1.30)  mg/dL


 


Albumin     (3.9-5)  g/dL


 


Urine WBC (Auto)     (0.0-6.0)  /HPF


 


Salicylates     (2.8-20.0)  mg/dL


 


Acetaminophen     (10.0-30.0)  ug/mL














  08/14/20 08/14/20 Range/Units





  09:35 Unknown 


 


WBC    (4.5-11.0)  K/mm3


 


RBC    (3.65-5.03)  M/mm3


 


Hgb    (10.1-14.3)  gm/dl


 


Hct    (30.3-42.9)  %


 


Plt Count    (140-440)  K/mm3


 


Lymph % (Auto)    (13.4-35.0)  %


 


Mono % (Auto)    (0.0-7.3)  %


 


Mono #    (0.0-0.8)  K/mm3


 


Baso #    (0.0-0.1)  K/mm3


 


Seg Neutrophils %    (40.0-70.0)  %


 


Seg Neutrophils #    (1.8-7.7)  K/mm3


 


D-Dimer    (0-234)  ng/mlDDU


 


Carbon Dioxide    (22-30)  mmol/L


 


BUN    (7-17)  mg/dL


 


Creatinine    (0.6-1.2)  mg/dL


 


Glucose    ()  mg/dL


 


Ferritin    (10.0-200.0)  ng/mL


 


Direct Bilirubin    (0-0.2)  mg/dL


 


AST    (5-40)  units/L


 


ALT    (7-56)  units/L


 


Lactate Dehydrogenase    ()  units/L


 


Total Creatine Kinase  90620 H   ()  units/L


 


C-Reactive Protein    (0.00-1.30)  mg/dL


 


Albumin    (3.9-5)  g/dL


 


Urine WBC (Auto)   17.0 H  (0.0-6.0)  /HPF


 


Salicylates    (2.8-20.0)  mg/dL


 


Acetaminophen    (10.0-30.0)  ug/mL








All other labs normal.

## 2020-08-15 LAB
ANISOCYTOSIS BLD QL SMEAR: (no result)
BAND NEUTROPHILS # (MANUAL): 0.8 K/MM3
BUN SERPL-MCNC: 22 MG/DL (ref 7–17)
BUN/CREAT SERPL: 17 %
CALCIUM SERPL-MCNC: 8 MG/DL (ref 8.4–10.2)
HCT VFR BLD CALC: 41.4 % (ref 30.3–42.9)
HEMOLYSIS INDEX: 7
HGB BLD-MCNC: 13.8 GM/DL (ref 10.1–14.3)
MCHC RBC AUTO-ENTMCNC: 33 % (ref 30–34)
MCV RBC AUTO: 89 FL (ref 79–97)
MYELOCYTES # (MANUAL): 0 K/MM3
PLATELET # BLD: 98 K/MM3 (ref 140–440)
PROMYELOCYTES # (MANUAL): 0 K/MM3
RBC # BLD AUTO: 4.67 M/MM3 (ref 3.65–5.03)
TOTAL CELLS COUNTED BLD: 100

## 2020-08-15 RX ADMIN — AZITHROMYCIN SCH MLS/HR: 500 INJECTION, POWDER, LYOPHILIZED, FOR SOLUTION INTRAVENOUS at 09:42

## 2020-08-15 RX ADMIN — VALPROATE SODIUM SCH MLS/HR: 100 INJECTION, SOLUTION INTRAVENOUS at 09:42

## 2020-08-15 RX ADMIN — DIVALPROEX SODIUM SCH: 125 TABLET, DELAYED RELEASE ORAL at 22:00

## 2020-08-15 RX ADMIN — HALOPERIDOL SCH: 5 TABLET ORAL at 22:00

## 2020-08-15 RX ADMIN — METHYLPREDNISOLONE SODIUM SUCCINATE SCH MG: 40 INJECTION, POWDER, FOR SOLUTION INTRAMUSCULAR; INTRAVENOUS at 21:41

## 2020-08-15 RX ADMIN — Medication SCH ML: at 09:45

## 2020-08-15 RX ADMIN — CEFTRIAXONE SODIUM SCH MLS/HR: 2 INJECTION, POWDER, FOR SOLUTION INTRAMUSCULAR; INTRAVENOUS at 09:43

## 2020-08-15 RX ADMIN — Medication SCH ML: at 21:41

## 2020-08-15 RX ADMIN — HEPARIN SODIUM SCH UNIT: 5000 INJECTION, SOLUTION INTRAVENOUS; SUBCUTANEOUS at 21:41

## 2020-08-15 RX ADMIN — HEPARIN SODIUM SCH UNIT: 5000 INJECTION, SOLUTION INTRAVENOUS; SUBCUTANEOUS at 05:14

## 2020-08-15 RX ADMIN — SODIUM CHLORIDE SCH MLS/HR: 0.9 INJECTION, SOLUTION INTRAVENOUS at 15:56

## 2020-08-15 RX ADMIN — HALOPERIDOL SCH MG: 5 TABLET ORAL at 12:23

## 2020-08-15 RX ADMIN — ACETAMINOPHEN PRN MG: 325 TABLET ORAL at 12:23

## 2020-08-15 RX ADMIN — VALPROATE SODIUM SCH MLS/HR: 100 INJECTION, SOLUTION INTRAVENOUS at 21:41

## 2020-08-15 RX ADMIN — HEPARIN SODIUM SCH UNIT: 5000 INJECTION, SOLUTION INTRAVENOUS; SUBCUTANEOUS at 14:28

## 2020-08-15 RX ADMIN — HEPARIN SODIUM SCH: 5000 INJECTION, SOLUTION INTRAVENOUS; SUBCUTANEOUS at 08:20

## 2020-08-15 NOTE — PROGRESS NOTE
Assessment and Plan


Assessment and plan: 


This is a 55-year-old female with paranoid schizophrenia that presents to the 

emergency department on 8/13 for altered mental status in a catatonic state.  

Previous hospitalizations reviewed and only notes a history of paranoid 

schizophrenia. Patient may be a resident of [place].  Patient is currently 

nonverbal and HPI is received from ER documentation.  Work-up in the emergency 

department included a CT abdomen pelvis which shows patchy peripheral 

groundglass opacities bilateral lungs which is consistent with either viral or 

atypical pneumonia, positive anterior lateral fat hernia in the left mid abdomen

and a large uterine fibroids however her CXR showed no acute pulmonary or 

pleural abnormalities.  She was found to have acute kidney injury with a 

creatinine of 1.4/BUN 33 as baseline from previous records seems to be 0.7, 

rhabdomyolysis with a creatinine kinase of 33,481, leukocytosis with a WBC of 

13.8, thrombocytopenia with platelets at 97.  In the emergency department she 

received 2 L of IV fluids and a dose of Zosyn.  She will be admitted to the 

hospitalist service as a COVID PUI given findings on CT with IV antibiotics, 

rhabdomyolysis, and WHITNEY. We will rule out neuroleptic malignant syndrome.  

Infectious disease, psych, and neurology have been consulted. 





* Discussed with primary caretaker patient was normally verbal but sometimes 

  goes into a catatonic state.  Phone number for this is [phone]


* 


Schizoaffective disorder


Severe rhabdomyolysis


Severe sepsis


COVID-19 pneumonia


Acute metabolic encephalopathy


Rule out neuroleptic malignant syndrome


Acute kidney injury secondary to vasomotor nephropathy and underlining severe 

rhabdomyolysis


Thrombocytopenia


Hyponatremia





Plan    


Continue supportive care


ID consultation input


Continue IV hydration to resolve rhabdomyolysis although COVID-19 pneumonia 

makes it complicated.  Will monitor oxygen saturation


Psych input noted start Haldol 5mg po BID


     Lorazepam 1mg IM x 1 to assist in r/o catatonia


CT head ordered


Steroid therapy in the setting of COVID-19


Continue antibiotics until fully evaluated by ID


Creatinine kinase improving


DVT and GI prophylaxis


Guarded prognosis       

















History


Interval history: 


Patient seen and examined clinically improving but still not speaking.








Hospitalist Physical





- Physical exam


Narrative exam: 





General appearance: Present: other (Patient slightly opens her eyes to tactile 

stimuli and withdraws to painful stimuli in all 4 extremities.  She is seen 

moving her left arm spontaneously.)





- EENT


Eyes: Present: PERRL





- Neck


Neck: Absent: carotid bruits





- Respiratory


Respiratory effort: normal


Respiratory: bilateral: diminished





- Cardiovascular


Rhythm: regular


Heart Sounds: Present: S1 & S2.  Absent: systolic murmur, diastolic murmur





- Extremities


Extremities: no ischemia, pulses intact, pulses symmetrical, No edema, normal 

temperature, normal color





- Abdominal


General gastrointestinal: Present: non-tender (No grimacing to palpation), non-

distended, normal bowel sounds





- Integumentary


Integumentary: Present: warm, dry





- Musculoskeletal


Musculoskeletal: other (Unable to assess)





- Psychiatric


Psychiatric: other





- Neurologic


Neurologic: other (Patient is seen moving her right arm spontaneously, she is 

nonverbal, opens her eyes slightly to tactile stimuli, withdraws to pain in all 

4 extremities)





- Allied Health


Allied health notes reviewed: nursing








- Constitutional


Vitals: 


                                        











Temp Pulse Resp BP Pulse Ox


 


 100 F H  82   35 H  148/47   95 


 


 08/15/20 03:27  08/15/20 05:34  08/15/20 05:34  08/15/20 05:34  08/15/20 07:47











General appearance: Present: other (Patient slightly opens her eyes to tactile 

stimuli and withdraws to painful stimuli in all 4 extremities.  She is seen 

moving her left arm spontaneously.)





Results





- Labs


CBC & Chem 7: 


                                 08/15/20 05:08





                                 08/15/20 05:08


Labs: 


                             Laboratory Last Values











WBC  10.4 K/mm3 (4.5-11.0)   08/15/20  05:08    


 


RBC  4.67 M/mm3 (3.65-5.03)   08/15/20  05:08    


 


Hgb  13.8 gm/dl (10.1-14.3)   08/15/20  05:08    


 


Hct  41.4 % (30.3-42.9)   08/15/20  05:08    


 


MCV  89 fl (79-97)   08/15/20  05:08    


 


MCH  30 pg (28-32)   08/15/20  05:08    


 


MCHC  33 % (30-34)   08/15/20  05:08    


 


RDW  15.2 % (13.2-15.2)   08/15/20  05:08    


 


Plt Count  98 K/mm3 (140-440)  L  08/15/20  05:08    


 


Lymph % (Auto)  10.6 % (13.4-35.0)  L  08/13/20  22:44    


 


Mono % (Auto)  9.8 % (0.0-7.3)  H  08/13/20  22:44    


 


Eos % (Auto)  0.0 % (0.0-4.3)   08/13/20  22:44    


 


Baso % (Auto)  1.2 % (0.0-1.8)   08/13/20  22:44    


 


Lymph #  1.5 K/mm3 (1.2-5.4)   08/13/20  22:44    


 


Mono #  1.3 K/mm3 (0.0-0.8)  H  08/13/20  22:44    


 


Eos #  0.0 K/mm3 (0.0-0.4)   08/13/20  22:44    


 


Baso #  0.2 K/mm3 (0.0-0.1)  H  08/13/20  22:44    


 


Add Manual Diff  Complete   08/15/20  05:08    


 


Total Counted  100   08/15/20  05:08    


 


Seg Neutrophils %  78.4 % (40.0-70.0)  H  08/13/20  22:44    


 


Seg Neuts % (Manual)  72.0 % (40.0-70.0)  H  08/15/20  05:08    


 


Band Neutrophils %  8.0 %  08/15/20  05:08    


 


Lymphocytes % (Manual)  13.0 % (13.4-35.0)  L  08/15/20  05:08    


 


Reactive Lymphs % (Man)  0 %  08/15/20  05:08    


 


Monocytes % (Manual)  7.0 % (0.0-7.3)   08/15/20  05:08    


 


Eosinophils % (Manual)  0 % (0.0-4.3)   08/15/20  05:08    


 


Basophils % (Manual)  0 % (0.0-1.8)   08/15/20  05:08    


 


Metamyelocytes %  0 %  08/15/20  05:08    


 


Myelocytes %  0 %  08/15/20  05:08    


 


Promyelocytes %  0 %  08/15/20  05:08    


 


Blast Cells %  0 %  08/15/20  05:08    


 


Nucleated RBC %  Not Reportable   08/15/20  05:08    


 


Seg Neutrophils #  10.8 K/mm3 (1.8-7.7)  H  08/13/20  22:44    


 


Seg Neutrophils # Man  7.5 K/mm3 (1.8-7.7)   08/15/20  05:08    


 


Band Neutrophils #  0.8 K/mm3  08/15/20  05:08    


 


Lymphocytes # (Manual)  1.4 K/mm3 (1.2-5.4)   08/15/20  05:08    


 


Abs React Lymphs (Man)  0.0 K/mm3  08/15/20  05:08    


 


Monocytes # (Manual)  0.7 K/mm3 (0.0-0.8)   08/15/20  05:08    


 


Eosinophils # (Manual)  0.0 K/mm3 (0.0-0.4)   08/15/20  05:08    


 


Basophils # (Manual)  0.0 K/mm3 (0.0-0.1)   08/15/20  05:08    


 


Metamyelocytes #  0.0 K/mm3  08/15/20  05:08    


 


Myelocytes #  0.0 K/mm3  08/15/20  05:08    


 


Promyelocytes #  0.0 K/mm3  08/15/20  05:08    


 


Blast Cells #  0.0 K/mm3  08/15/20  05:08    


 


WBC Morphology  Not Reportable   08/15/20  05:08    


 


Hypersegmented Neuts  Not Reportable   08/15/20  05:08    


 


Hyposegmented Neuts  Not Reportable   08/15/20  05:08    


 


Hypogranular Neuts  Not Reportable   08/15/20  05:08    


 


Smudge Cells  Not Reportable   08/15/20  05:08    


 


Toxic Granulation  Not Reportable   08/15/20  05:08    


 


Toxic Vacuolation  Not Reportable   08/15/20  05:08    


 


Dohle Bodies  Not Reportable   08/15/20  05:08    


 


Pelger-Huet Anomaly  Not Reportable   08/15/20  05:08    


 


Arnie Rods  Not Reportable   08/15/20  05:08    


 


Platelet Estimate  Consistent w auto   08/15/20  05:08    


 


Clumped Platelets  Not Reportable   08/15/20  05:08    


 


Plt Clumps, EDTA  Not Reportable   08/15/20  05:08    


 


Large Platelets  Not Reportable   08/15/20  05:08    


 


Giant Platelets  Not Reportable   08/15/20  05:08    


 


Platelet Satelliting  Not Reportable   08/15/20  05:08    


 


Plt Morphology Comment  Not Reportable   08/15/20  05:08    


 


RBC Morphology  Not Reportable   08/15/20  05:08    


 


Dimorphic RBCs  Not Reportable   08/15/20  05:08    


 


Polychromasia  Not Reportable   08/15/20  05:08    


 


Hypochromasia  Not Reportable   08/15/20  05:08    


 


Poikilocytosis  Not Reportable   08/15/20  05:08    


 


Anisocytosis  1+   08/15/20  05:08    


 


Microcytosis  Not Reportable   08/15/20  05:08    


 


Macrocytosis  Not Reportable   08/15/20  05:08    


 


Spherocytes  Not Reportable   08/15/20  05:08    


 


Pappenheimer Bodies  Not Reportable   08/15/20  05:08    


 


Sickle Cells  Not Reportable   08/15/20  05:08    


 


Target Cells  Not Reportable   08/15/20  05:08    


 


Tear Drop Cells  Not Reportable   08/15/20  05:08    


 


Ovalocytes  Not Reportable   08/15/20  05:08    


 


Helmet Cells  Not Reportable   08/15/20  05:08    


 


[facility] Bodies  Not Reportable   08/15/20  05:08    


 


Cabot Rings  Not Reportable   08/15/20  05:08    


 


[place] Cells  Not Reportable   08/15/20  05:08    


 


Bite Cells  Not Reportable   08/15/20  05:08    


 


Crenated Cell  Not Reportable   08/15/20  05:08    


 


Elliptocytes  Not Reportable   08/15/20  05:08    


 


Acanthocytes (Spur)  Not Reportable   08/15/20  05:08    


 


Rouleaux  Not Reportable   08/15/20  05:08    


 


Hemoglobin C Crystals  Not Reportable   08/15/20  05:08    


 


Schistocytes  Not Reportable   08/15/20  05:08    


 


Malaria parasites  Not Reportable   08/15/20  05:08    


 


[place] Bodies  Not Reportable   08/15/20  05:08    


 


Hem Pathologist Commnt  No   08/15/20  05:08    


 


D-Dimer  > 07486 ng/mlDDU (0-234)  H  08/14/20  09:16    


 


Sodium  147 mmol/L (137-145)  H  08/15/20  05:08    


 


Potassium  3.7 mmol/L (3.6-5.0)   08/15/20  05:08    


 


Chloride  107.1 mmol/L ()  H  08/15/20  05:08    


 


Carbon Dioxide  25 mmol/L (22-30)   08/15/20  05:08    


 


Anion Gap  19 mmol/L  08/15/20  05:08    


 


BUN  22 mg/dL (7-17)  H  08/15/20  05:08    


 


Creatinine  1.3 mg/dL (0.6-1.2)  H  08/15/20  05:08    


 


Estimated GFR  51 ml/min  08/15/20  05:08    


 


BUN/Creatinine Ratio  17 %  08/15/20  05:08    


 


Glucose  121 mg/dL ()  H  08/15/20  05:08    


 


Lactic Acid  1.30 mmol/L (0.7-2.0)   08/13/20  22:44    


 


Calcium  8.0 mg/dL (8.4-10.2)  L  08/15/20  05:08    


 


Ferritin  962.2 ng/mL (10.0-200.0)  H  08/14/20  09:16    


 


Total Bilirubin  0.50 mg/dL (0.1-1.2)   08/13/20  22:44    


 


Direct Bilirubin  0.3 mg/dL (0-0.2)  H  08/13/20  22:44    


 


Indirect Bilirubin  0.2 mg/dL  08/13/20  22:44    


 


AST  766 units/L (5-40)  H  08/13/20  22:44    


 


ALT  159 units/L (7-56)  H  08/13/20  22:44    


 


Alkaline Phosphatase  49 units/L ()   08/13/20  22:44    


 


Lactate Dehydrogenase  1277 units/L ()  H  08/14/20  09:16    


 


Total Creatine Kinase  45257 units/L ()  H  08/14/20  23:41    


 


C-Reactive Protein  9.40 mg/dL (0.00-1.30)  H  08/14/20  09:16    


 


Total Protein  7.6 g/dL (6.3-8.2)   08/13/20  22:44    


 


Albumin  3.7 g/dL (3.9-5)  L  08/13/20  22:44    


 


Albumin/Globulin Ratio  0.9 %  08/13/20  22:44    


 


Procalcitonin  9.50 ng/mL (<0.15)   08/14/20  09:16    


 


Urine Color  Tiffany  (Yellow)   08/14/20  Unknown


 


Urine Turbidity  Slightly-cloudy  (Clear)   08/14/20  Unknown


 


Urine pH  5.0  (5.0-7.0)   08/14/20  Unknown


 


Ur Specific Gravity  1.026  (1.003-1.030)   08/14/20  Unknown


 


Urine Protein  100 mg/dl mg/dL (Negative)   08/14/20  Unknown


 


Urine Glucose (UA)  Neg mg/dL (Negative)   08/14/20  Unknown


 


Urine Ketones  Tr mg/dL (Negative)   08/14/20  Unknown


 


Urine Blood  Lg  (Negative)   08/14/20  Unknown


 


Urine Nitrite  Neg  (Negative)   08/14/20  Unknown


 


Urine Bilirubin  Neg  (Negative)   08/14/20  Unknown


 


Urine Urobilinogen  2.0 mg/dL (<2.0)   08/14/20  Unknown


 


Ur Leukocyte Esterase  Neg  (Negative)   08/14/20  Unknown


 


Urine WBC (Auto)  17.0 /HPF (0.0-6.0)  H  08/14/20  Unknown


 


Urine RBC (Auto)  1.0 /HPF (0.0-6.0)   08/14/20  Unknown


 


U Epithel Cells (Auto)  4.0 /HPF (0-13.0)   08/14/20  Unknown


 


Urine Mucus  2+ /HPF  08/14/20  Unknown


 


Salicylates  < 0.3 mg/dL (2.8-20.0)  L  08/13/20  22:44    


 


Urine Opiates Screen  Presumptive negative   08/14/20  Unknown


 


Urine Methadone Screen  Presumptive negative   08/14/20  Unknown


 


Acetaminophen  5.0 ug/mL (10.0-30.0)  L  08/13/20  22:44    


 


Ur Barbiturates Screen  Presumptive negative   08/14/20  Unknown


 


Ur Phencyclidine Scrn  Presumptive negative   08/14/20  Unknown


 


Ur Amphetamines Screen  Presumptive negative   08/14/20  Unknown


 


U Benzodiazepines Scrn  Presumptive negative   08/14/20  Unknown


 


Urine Cocaine Screen  Presumptive negative   08/14/20  Unknown


 


U Marijuana (THC) Screen  Presumptive negative   08/14/20  Unknown


 


Drugs of Abuse Note  Disclamer   08/14/20  Unknown


 


Plasma/Serum Alcohol  < 0.01 % (0-0.07)   08/13/20  22:44    











Microbiology: 


Microbiology





08/14/20 01:40   Peripheral/Venous   Blood Culture - Preliminary


                            NO GROWTH AFTER 24 HOURS


08/14/20 01:59   Peripheral/Venous   Blood Culture - Preliminary


                            NO GROWTH AFTER 24 HOURS








Schmidt/IV: 


                                        





Voiding Method                   Incontinent


IV Catheter Type [Right          Peripheral IV


External Jugular]                











Active Medications





- Current Medications


Current Medications: 














Generic Name Dose Route Start Last Admin





  Trade Name Freq  PRN Reason Stop Dose Admin


 


Acetaminophen  650 mg  08/14/20 08:00  08/14/20 23:13





  Tylenol  PO   650 mg





  Q4H PRN   Administration





  Pain MILD(1-3)/Fever >100.5/HA  


 


Divalproex Sodium  1,500 mg  08/15/20 22:00 





  Depakote Dr  PO  





  QHS KATHYA  


 


Heparin Sodium (Porcine)  5,000 unit  08/14/20 10:00  08/15/20 08:20





  Heparin  SUB-Q   Not Given





  Q8HR [facility]  


 


Sodium Chloride  1,000 mls @ 125 mls/hr  08/14/20 08:00 





  Nacl 0.9% 1000 Ml  IV  





  AS DIRECT KATHYA  


 


Azithromycin 500 mg/ Sodium  250 mls @ 250 mls/hr  08/14/20 10:00  08/15/20 

09:42





  Chloride  IV   250 mls/hr





  Q24HR KATHYA   Administration





  Protocol  


 


Ceftriaxone Sodium  2 gm in 100 mls @ 200 mls/hr  08/14/20 10:00  08/15/20 09:43





  Rocephin/Ns 2 Gm/100 Ml  IV   200 mls/hr





  Q24HR KATHYA   Administration





  Protocol  


 


Valproate Sodium 500 mg/  105 mls @ 100 mls/hr  08/14/20 13:00  08/15/20 09:42





  Sodium Chloride  IV   100 mls/hr





  Q12HR KATHYA   Administration


 


Miscellaneous Medication  325 mg  08/15/20 18:45 





  Paliperidone Palmitate [Invega Sustenna]  IM  





  Q3W KATHYA  


 


Ondansetron HCl  4 mg  08/14/20 08:00 





  Zofran  IV  





  Q8H PRN  





  Nausea And Vomiting  


 


Sodium Chloride  10 ml  08/14/20 10:00  08/15/20 09:45





  Sodium Chloride Flush Syringe 10 Ml  IV   10 ml





  BID KATHYA   Administration


 


Sodium Chloride  10 ml  08/14/20 07:50 





  Sodium Chloride Flush Syringe 10 Ml  IV  08/27/20 07:49 





  PRN PRN  





  LINE FLUSH  


 


Trazodone HCl  50 mg  08/14/20 22:00  08/14/20 23:06





  Desyrel  PO   50 mg





  QHS KATHYA   Administration

## 2020-08-15 NOTE — PROGRESS NOTE
Subjective





- Reason for Consult


Consult date: 08/15/20


Reason for consult: AMS





- Chief Complaint


Chief complaint: 


The patient's medical record was reviewed and the patient's progress was 

discussed with the nursing staff. The nurse note states the patient is non 

verbal but move all extremities.  Skin in good condition.  No s/s of pain.  The 

patient is resistive to care but can slowly follow very simple commands. 





I attempted to interview the patient today, she is lying in bed, with her eyes 

open. She did not respond nor acknowledge me speaking to her. Obtained father's 

number from hospitalist. Called numbers [phone] and [phone], did not g

et an answer. A generic voice message was left with return number. Also spoke 

with [facility] to see if the patient was a resident there, to obtain collateral

information and baseline. The staff answering the phone says the patient's name 

did not ring a bell and was not a resident there. I also called Klaudia with [employer], at [phone] as this is the number listed in the patient's clinica

l data. Klaudia says the patient used to be a resident at her facility but 

hasn't been one in three years. She states the person was verbal at that time, 

but often will not speak if she's been off her medications. She says if the 

patient is needing placement upon discharge, she will take the patient back. 





REVIEW OF SYSTEMS


Unable to assess due to patient factors





MENTAL STATUS EXAMINATION


Unable to obtain





 ASSESSMENT 


Schizoaffective disorder





RECOMMENDATIONS


MEDICATIONS


     Start Haldol 5mg po BID


     Lorazepam 1mg IM x 1 to assist in r/o catatonia


       **Please document response to Lorazepam


Risks, benefits and alternatives of medications discussed with the patient, 

questions answered and consent obtained from patient.


PSYCHOTHERAPY: Supportive psychotherapy provided


MEDICAL: Per primary team


DELIRIUM PRECAUTIONS: Please re-orient patient frequently, keep lights on during

the day, and minimize benzodiazepines and opiates as these medications could 

worsen patient's confusion.


SAFETY SITTER: per medical team


DISPOSITION: TBD


LEGAL STATUS: Involuntary


Will continue to follow 


Thank you for the consult. Please contact with any questions and/or concerns.








Mental Status Exam





- Vital signs


                                Last Vital Signs











Temp  100.8 F H  08/15/20 08:00


 


Pulse  98 H  08/15/20 10:30


 


Resp  12   08/15/20 10:30


 


BP  113/67   08/15/20 10:30


 


Pulse Ox  94   08/15/20 10:30

## 2020-08-16 LAB
ALBUMIN SERPL-MCNC: 2.9 G/DL (ref 3.9–5)
ALT SERPL-CCNC: 115 UNITS/L (ref 7–56)
BUN SERPL-MCNC: 17 MG/DL (ref 7–17)
BUN/CREAT SERPL: 19 %
CALCIUM SERPL-MCNC: 7.9 MG/DL (ref 8.4–10.2)
HCT VFR BLD CALC: 39.2 % (ref 30.3–42.9)
HEMOLYSIS INDEX: 10
HGB BLD-MCNC: 12.9 GM/DL (ref 10.1–14.3)
MCHC RBC AUTO-ENTMCNC: 33 % (ref 30–34)
MCV RBC AUTO: 89 FL (ref 79–97)
PLATELET # BLD: 126 K/MM3 (ref 140–440)
RBC # BLD AUTO: 4.43 M/MM3 (ref 3.65–5.03)

## 2020-08-16 RX ADMIN — AZITHROMYCIN SCH MLS/HR: 500 INJECTION, POWDER, LYOPHILIZED, FOR SOLUTION INTRAVENOUS at 09:10

## 2020-08-16 RX ADMIN — HEPARIN SODIUM SCH UNIT: 5000 INJECTION, SOLUTION INTRAVENOUS; SUBCUTANEOUS at 14:45

## 2020-08-16 RX ADMIN — HEPARIN SODIUM SCH UNIT: 5000 INJECTION, SOLUTION INTRAVENOUS; SUBCUTANEOUS at 05:17

## 2020-08-16 RX ADMIN — Medication SCH ML: at 09:12

## 2020-08-16 RX ADMIN — HEPARIN SODIUM SCH UNIT: 5000 INJECTION, SOLUTION INTRAVENOUS; SUBCUTANEOUS at 22:29

## 2020-08-16 RX ADMIN — HALOPERIDOL SCH MG: 5 TABLET ORAL at 22:20

## 2020-08-16 RX ADMIN — METHYLPREDNISOLONE SODIUM SUCCINATE SCH MG: 40 INJECTION, POWDER, FOR SOLUTION INTRAMUSCULAR; INTRAVENOUS at 05:17

## 2020-08-16 RX ADMIN — Medication SCH ML: at 22:21

## 2020-08-16 RX ADMIN — METHYLPREDNISOLONE SODIUM SUCCINATE SCH MG: 40 INJECTION, POWDER, FOR SOLUTION INTRAMUSCULAR; INTRAVENOUS at 22:21

## 2020-08-16 RX ADMIN — METHYLPREDNISOLONE SODIUM SUCCINATE SCH MG: 40 INJECTION, POWDER, FOR SOLUTION INTRAMUSCULAR; INTRAVENOUS at 14:44

## 2020-08-16 RX ADMIN — CEFTRIAXONE SODIUM SCH MLS/HR: 2 INJECTION, POWDER, FOR SOLUTION INTRAMUSCULAR; INTRAVENOUS at 09:11

## 2020-08-16 RX ADMIN — DIVALPROEX SODIUM SCH: 125 TABLET, DELAYED RELEASE ORAL at 22:16

## 2020-08-16 RX ADMIN — SODIUM CHLORIDE SCH MLS/HR: 0.9 INJECTION, SOLUTION INTRAVENOUS at 05:17

## 2020-08-16 RX ADMIN — VALPROATE SODIUM SCH MLS/HR: 100 INJECTION, SOLUTION INTRAVENOUS at 09:10

## 2020-08-16 RX ADMIN — ACETAMINOPHEN PRN MG: 325 TABLET ORAL at 22:31

## 2020-08-16 RX ADMIN — SODIUM CHLORIDE SCH MLS/HR: 0.45 INJECTION, SOLUTION INTRAVENOUS at 10:30

## 2020-08-16 RX ADMIN — SODIUM CHLORIDE SCH MLS/HR: 0.45 INJECTION, SOLUTION INTRAVENOUS at 22:33

## 2020-08-16 RX ADMIN — HALOPERIDOL SCH MG: 5 TABLET ORAL at 09:10

## 2020-08-16 NOTE — CAT SCAN REPORT
CTA CHEST WITH IV CONTRAST



INDICATION:

Altered mental status.



TECHNIQUE:

Axial CT images were obtained through the chest after injection of 100 mL Omnipaque 350 IV contrast. 
3 plane MIP reconstructions were produced. All CT scans at this location are performed using CT dose 
reduction for ALARA by means of automated exposure control. 



COMPARISON:

None available.



FINDINGS:

Pulmonary Arteries: No pulmonary emboli.



Lungs: There are patchy peripheral areas of groundglass opacity in both lungs.

Trachea and Bronchi:  No significant abnormality.



Heart and Pericardium: No significant abnormality.

Vasculature: No significant abnormality.

Lymphatics: No lymphadenopathy.



Additional Findings: None.



Upper Abdomen: No acute findings.



Skeletal Structures: No significant osseous abnormality.



IMPRESSION:

1. No CT evidence for pulmonary embolism. 

2. Patchy areas of groundglass opacity in both lungs suggestive of viral pneumonia.



Signer Name: Chas Sarmiento MD 

Signed: 8/16/2020 5:28 PM

Workstation Name: [id number]

## 2020-08-16 NOTE — PROGRESS NOTE
Assessment and Plan


Assessment and plan: 


This is a 55-year-old female with paranoid schizophrenia that presents to the 

emergency department on 8/13 for altered mental status in a catatonic state.  

Previous hospitalizations reviewed and only notes a history of paranoid 

schizophrenia. Patient may be a resident of [place].  Patient is currently 

nonverbal and HPI is received from ER documentation.  Work-up in the emergency 

department included a CT abdomen pelvis which shows patchy peripheral 

groundglass opacities bilateral lungs which is consistent with either viral or 

atypical pneumonia, positive anterior lateral fat hernia in the left mid abdomen

and a large uterine fibroids however her CXR showed no acute pulmonary or 

pleural abnormalities.  She was found to have acute kidney injury with a 

creatinine of 1.4/BUN 33 as baseline from previous records seems to be 0.7, 

rhabdomyolysis with a creatinine kinase of 33,481, leukocytosis with a WBC of 

13.8, thrombocytopenia with platelets at 97.  In the emergency department she 

received 2 L of IV fluids and a dose of Zosyn.  She will be admitted to the 

hospitalist service as a COVID PUI given findings on CT with IV antibiotics, 

rhabdomyolysis, and WHITNEY. We will rule out neuroleptic malignant syndrome.  

Infectious disease, psych, and neurology have been consulted. 





* Discussed with primary caretaker patient was normally verbal but sometimes 

  goes into a catatonic state.  Phone number for this is [phone]





* 8/16: Change fluids to NS 1/2 AT 125CC/hr. Rhabdomylysis improving, insert NGT

  and start tube feeds. Psych input noted. Continue management for COVID 19. ID 

  consulted. No new fever. 





Schizoaffective disorder


Severe rhabdomyolysis


Severe sepsis


COVID-19 pneumonia


Acute metabolic encephalopathy


Rule out neuroleptic malignant syndrome


Acute kidney injury secondary to vasomotor nephropathy and underlining severe 

rhabdomyolysis


Thrombocytopenia


Hyponatremia





Plan    


Continue supportive care


ID consultation input


Continue IV hydration to resolve rhabdomyolysis although COVID-19 pneumonia 

makes it complicated.  Will monitor oxygen saturation


Psych input noted start Haldol 5mg po BID


     Lorazepam 1mg IM x 1 to assist in r/o catatonia


CT head ordered


Steroid therapy in the setting of COVID-19


Continue antibiotics until fully evaluated by ID


Creatinine kinase improving


DVT and GI prophylaxis


Guarded prognosis       

















History


Interval history: 


Patient seen and examined clinically improving but still not speaking. Discussed

with nursing staff at the bedside








Hospitalist Physical





- Physical exam


Narrative exam: 


General appearance: Present: awake, moving all ext. 





- EENT


Eyes: Present: PERRL





- Neck


Neck: Absent: carotid bruits





- Respiratory


Respiratory effort: normal


Respiratory: bilateral: diminished





- Cardiovascular


Rhythm: regular


Heart Sounds: Present: S1 & S2.  Absent: systolic murmur, diastolic murmur





- Extremities


Extremities: no ischemia, pulses intact, pulses symmetrical, No edema, normal 

temperature, normal color





- Abdominal


General gastrointestinal: Present: non-tender (No grimacing to palpation), non-

distended, normal bowel sounds





- Integumentary


Integumentary: Present: warm, dry





- Musculoskeletal


Musculoskeletal: omoving all ext


- Psychiatric


Psychiatric: other





- Neurologic


Neurologic: awake, lethergic


- Allied Health


Allied health notes reviewed: nursing








- Constitutional


Vitals: 


                                        











Temp Pulse Resp BP Pulse Ox


 


 97.8 F   98 H  18   156/99   94 


 


 08/16/20 13:59  08/16/20 13:57  08/16/20 13:57  08/16/20 13:57  08/16/20 13:57











General appearance: Present: other (Patient slightly opens her eyes to tactile 

stimuli and withdraws to painful stimuli in all 4 extremities.  She is seen 

moving her left arm spontaneously.)





Results





- Labs


CBC & Chem 7: 


                                 08/16/20 03:58





                                 08/16/20 03:58


Labs: 


                             Laboratory Last Values











WBC  9.9 K/mm3 (4.5-11.0)   08/16/20  03:58    


 


RBC  4.43 M/mm3 (3.65-5.03)   08/16/20  03:58    


 


Hgb  12.9 gm/dl (10.1-14.3)   08/16/20  03:58    


 


Hct  39.2 % (30.3-42.9)   08/16/20  03:58    


 


MCV  89 fl (79-97)   08/16/20  03:58    


 


MCH  29 pg (28-32)   08/16/20  03:58    


 


MCHC  33 % (30-34)   08/16/20  03:58    


 


RDW  15.3 % (13.2-15.2)  H  08/16/20  03:58    


 


Plt Count  126 K/mm3 (140-440)  L  08/16/20  03:58    


 


Lymph % (Auto)  10.6 % (13.4-35.0)  L  08/13/20  22:44    


 


Mono % (Auto)  9.8 % (0.0-7.3)  H  08/13/20  22:44    


 


Eos % (Auto)  0.0 % (0.0-4.3)   08/13/20  22:44    


 


Baso % (Auto)  1.2 % (0.0-1.8)   08/13/20  22:44    


 


Lymph #  1.5 K/mm3 (1.2-5.4)   08/13/20  22:44    


 


Mono #  1.3 K/mm3 (0.0-0.8)  H  08/13/20  22:44    


 


Eos #  0.0 K/mm3 (0.0-0.4)   08/13/20  22:44    


 


Baso #  0.2 K/mm3 (0.0-0.1)  H  08/13/20  22:44    


 


Add Manual Diff  Complete   08/15/20  05:08    


 


Total Counted  100   08/15/20  05:08    


 


Seg Neutrophils %  78.4 % (40.0-70.0)  H  08/13/20  22:44    


 


Seg Neuts % (Manual)  72.0 % (40.0-70.0)  H  08/15/20  05:08    


 


Band Neutrophils %  8.0 %  08/15/20  05:08    


 


Lymphocytes % (Manual)  13.0 % (13.4-35.0)  L  08/15/20  05:08    


 


Reactive Lymphs % (Man)  0 %  08/15/20  05:08    


 


Monocytes % (Manual)  7.0 % (0.0-7.3)   08/15/20  05:08    


 


Eosinophils % (Manual)  0 % (0.0-4.3)   08/15/20  05:08    


 


Basophils % (Manual)  0 % (0.0-1.8)   08/15/20  05:08    


 


Metamyelocytes %  0 %  08/15/20  05:08    


 


Myelocytes %  0 %  08/15/20  05:08    


 


Promyelocytes %  0 %  08/15/20  05:08    


 


Blast Cells %  0 %  08/15/20  05:08    


 


Nucleated RBC %  Not Reportable   08/15/20  05:08    


 


Seg Neutrophils #  10.8 K/mm3 (1.8-7.7)  H  08/13/20  22:44    


 


Seg Neutrophils # Man  7.5 K/mm3 (1.8-7.7)   08/15/20  05:08    


 


Band Neutrophils #  0.8 K/mm3  08/15/20  05:08    


 


Lymphocytes # (Manual)  1.4 K/mm3 (1.2-5.4)   08/15/20  05:08    


 


Abs React Lymphs (Man)  0.0 K/mm3  08/15/20  05:08    


 


Monocytes # (Manual)  0.7 K/mm3 (0.0-0.8)   08/15/20  05:08    


 


Eosinophils # (Manual)  0.0 K/mm3 (0.0-0.4)   08/15/20  05:08    


 


Basophils # (Manual)  0.0 K/mm3 (0.0-0.1)   08/15/20  05:08    


 


Metamyelocytes #  0.0 K/mm3  08/15/20  05:08    


 


Myelocytes #  0.0 K/mm3  08/15/20  05:08    


 


Promyelocytes #  0.0 K/mm3  08/15/20  05:08    


 


Blast Cells #  0.0 K/mm3  08/15/20  05:08    


 


WBC Morphology  Not Reportable   08/15/20  05:08    


 


Hypersegmented Neuts  Not Reportable   08/15/20  05:08    


 


Hyposegmented Neuts  Not Reportable   08/15/20  05:08    


 


Hypogranular Neuts  Not Reportable   08/15/20  05:08    


 


Smudge Cells  Not Reportable   08/15/20  05:08    


 


Toxic Granulation  Not Reportable   08/15/20  05:08    


 


Toxic Vacuolation  Not Reportable   08/15/20  05:08    


 


Dohle Bodies  Not Reportable   08/15/20  05:08    


 


Pelger-Huet Anomaly  Not Reportable   08/15/20  05:08    


 


Arnie Rods  Not Reportable   08/15/20  05:08    


 


Platelet Estimate  Consistent w auto   08/15/20  05:08    


 


Clumped Platelets  Not Reportable   08/15/20  05:08    


 


Plt Clumps, EDTA  Not Reportable   08/15/20  05:08    


 


Large Platelets  Not Reportable   08/15/20  05:08    


 


Giant Platelets  Not Reportable   08/15/20  05:08    


 


Platelet Satelliting  Not Reportable   08/15/20  05:08    


 


Plt Morphology Comment  Not Reportable   08/15/20  05:08    


 


RBC Morphology  Not Reportable   08/15/20  05:08    


 


Dimorphic RBCs  Not Reportable   08/15/20  05:08    


 


Polychromasia  Not Reportable   08/15/20  05:08    


 


Hypochromasia  Not Reportable   08/15/20  05:08    


 


Poikilocytosis  Not Reportable   08/15/20  05:08    


 


Anisocytosis  1+   08/15/20  05:08    


 


Microcytosis  Not Reportable   08/15/20  05:08    


 


Macrocytosis  Not Reportable   08/15/20  05:08    


 


Spherocytes  Not Reportable   08/15/20  05:08    


 


Pappenheimer Bodies  Not Reportable   08/15/20  05:08    


 


Sickle Cells  Not Reportable   08/15/20  05:08    


 


Target Cells  Not Reportable   08/15/20  05:08    


 


Tear Drop Cells  Not Reportable   08/15/20  05:08    


 


Ovalocytes  Not Reportable   08/15/20  05:08    


 


Helmet Cells  Not Reportable   08/15/20  05:08    


 


[place] Bodies  Not Reportable   08/15/20  05:08    


 


Cabot Rings  Not Reportable   08/15/20  05:08    


 


[place] Cells  Not Reportable   08/15/20  05:08    


 


Bite Cells  Not Reportable   08/15/20  05:08    


 


Crenated Cell  Not Reportable   08/15/20  05:08    


 


Elliptocytes  Not Reportable   08/15/20  05:08    


 


Acanthocytes (Spur)  Not Reportable   08/15/20  05:08    


 


Rouleaux  Not Reportable   08/15/20  05:08    


 


Hemoglobin C Crystals  Not Reportable   08/15/20  05:08    


 


Schistocytes  Not Reportable   08/15/20  05:08    


 


Malaria parasites  Not Reportable   08/15/20  05:08    


 


[place] Bodies  Not Reportable   08/15/20  05:08    


 


Hem Pathologist Commnt  No   08/15/20  05:08    


 


D-Dimer  > 45660 ng/mlDDU (0-234)  H  08/14/20  09:16    


 


Sodium  150 mmol/L (137-145)  H  08/16/20  03:58    


 


Potassium  3.9 mmol/L (3.6-5.0)   08/16/20  03:58    


 


Chloride  110.9 mmol/L ()  H  08/16/20  03:58    


 


Carbon Dioxide  23 mmol/L (22-30)   08/16/20  03:58    


 


Anion Gap  20 mmol/L  08/16/20  03:58    


 


BUN  17 mg/dL (7-17)   08/16/20  03:58    


 


Creatinine  0.9 mg/dL (0.6-1.2)   08/16/20  03:58    


 


Estimated GFR  > 60 ml/min  08/16/20  03:58    


 


BUN/Creatinine Ratio  19 %  08/16/20  03:58    


 


Glucose  141 mg/dL ()  H  08/16/20  03:58    


 


Lactic Acid  1.30 mmol/L (0.7-2.0)   08/13/20  22:44    


 


Calcium  7.9 mg/dL (8.4-10.2)  L  08/16/20  03:58    


 


Ferritin  962.2 ng/mL (10.0-200.0)  H  08/14/20  09:16    


 


Total Bilirubin  0.40 mg/dL (0.1-1.2)   08/16/20  03:58    


 


Direct Bilirubin  0.3 mg/dL (0-0.2)  H  08/13/20  22:44    


 


Indirect Bilirubin  0.2 mg/dL  08/13/20  22:44    


 


AST  351 units/L (5-40)  H  08/16/20  03:58    


 


ALT  115 units/L (7-56)  H  08/16/20  03:58    


 


Alkaline Phosphatase  39 units/L ()   08/16/20  03:58    


 


Lactate Dehydrogenase  1277 units/L ()  H  08/14/20  09:16    


 


Total Creatine Kinase  8586 units/L ()  H  08/16/20  03:58    


 


C-Reactive Protein  9.40 mg/dL (0.00-1.30)  H  08/14/20  09:16    


 


Total Protein  6.8 g/dL (6.3-8.2)   08/16/20  03:58    


 


Albumin  2.9 g/dL (3.9-5)  L  08/16/20  03:58    


 


Albumin/Globulin Ratio  0.7 %  08/16/20  03:58    


 


Procalcitonin  9.50 ng/mL (<0.15)   08/14/20  09:16    


 


Urine Color  Tiffany  (Yellow)   08/14/20  Unknown


 


Urine Turbidity  Slightly-cloudy  (Clear)   08/14/20  Unknown


 


Urine pH  5.0  (5.0-7.0)   08/14/20  Unknown


 


Ur Specific Gravity  1.026  (1.003-1.030)   08/14/20  Unknown


 


Urine Protein  100 mg/dl mg/dL (Negative)   08/14/20  Unknown


 


Urine Glucose (UA)  Neg mg/dL (Negative)   08/14/20  Unknown


 


Urine Ketones  Tr mg/dL (Negative)   08/14/20  Unknown


 


Urine Blood  Lg  (Negative)   08/14/20  Unknown


 


Urine Nitrite  Neg  (Negative)   08/14/20  Unknown


 


Urine Bilirubin  Neg  (Negative)   08/14/20  Unknown


 


Urine Urobilinogen  2.0 mg/dL (<2.0)   08/14/20  Unknown


 


Ur Leukocyte Esterase  Neg  (Negative)   08/14/20  Unknown


 


Urine WBC (Auto)  17.0 /HPF (0.0-6.0)  H  08/14/20  Unknown


 


Urine RBC (Auto)  1.0 /HPF (0.0-6.0)   08/14/20  Unknown


 


U Epithel Cells (Auto)  4.0 /HPF (0-13.0)   08/14/20  Unknown


 


Urine Mucus  2+ /HPF  08/14/20  Unknown


 


Salicylates  < 0.3 mg/dL (2.8-20.0)  L  08/13/20  22:44    


 


Urine Opiates Screen  Presumptive negative   08/14/20  Unknown


 


Urine Methadone Screen  Presumptive negative   08/14/20  Unknown


 


Acetaminophen  5.0 ug/mL (10.0-30.0)  L  08/13/20  22:44    


 


Ur Barbiturates Screen  Presumptive negative   08/14/20  Unknown


 


Ur Phencyclidine Scrn  Presumptive negative   08/14/20  Unknown


 


Ur Amphetamines Screen  Presumptive negative   08/14/20  Unknown


 


U Benzodiazepines Scrn  Presumptive negative   08/14/20  Unknown


 


Urine Cocaine Screen  Presumptive negative   08/14/20  Unknown


 


U Marijuana (THC) Screen  Presumptive negative   08/14/20  Unknown


 


Drugs of Abuse Note  Disclamer   08/14/20  Unknown


 


Plasma/Serum Alcohol  < 0.01 % (0-0.07)   08/13/20  22:44    


 


Coronavirus (PCR)  Positive  (Negative)  A  08/15/20  Unknown











Microbiology: 


Microbiology





08/14/20 Unknown   Urine,Clean Catch   Urine Culture - Preliminary


                                NO GROWTH AFTER 24 HOURS


08/14/20 01:40   Peripheral/Venous   Blood Culture - Preliminary


                            NO GROWTH AFTER 48 HOURS


08/14/20 01:59   Peripheral/Venous   Blood Culture - Preliminary


                            NO GROWTH AFTER 48 HOURS








Schmidt/IV: 


                                        





Voiding Method                   External Female Catheter


IV Catheter Type [Right          INT / Saline Lock


Antecubital]                     


IV Catheter Type [Right Foot]    Peripheral IV


IV Catheter Type [Right          Peripheral IV


External Jugular]                











Active Medications





- Current Medications


Current Medications: 














Generic Name Dose Route Start Last Admin





  Trade Name Freq  PRN Reason Stop Dose Admin


 


Acetaminophen  650 mg  08/14/20 08:00  08/15/20 12:23





  Tylenol  PO   650 mg





  Q4H PRN   Administration





  Pain MILD(1-3)/Fever >100.5/HA  


 


Divalproex Sodium  1,500 mg  08/15/20 22:00  08/15/20 22:00





  Depakote Dr  PO   Not Given





  QHS KATHYA  


 


Haloperidol  5 mg  08/15/20 12:00  08/16/20 09:10





  Haldol  PO   5 mg





  BID KATHYA   Administration


 


Heparin Sodium (Porcine)  5,000 unit  08/14/20 10:00  08/16/20 14:45





  Heparin  SUB-Q   5,000 unit





  Q8HR KATHYA   Administration


 


Azithromycin 500 mg/ Sodium  250 mls @ 250 mls/hr  08/14/20 10:00  08/16/20 

09:10





  Chloride  IV   250 mls/hr





  Q24HR KATHYA   Administration





  Protocol  


 


Ceftriaxone Sodium  2 gm in 100 mls @ 200 mls/hr  08/14/20 10:00  08/16/20 09:11





  Rocephin/Ns 2 Gm/100 Ml  IV   200 mls/hr





  Q24HR KATHYA   Administration





  Protocol  


 


Sodium Chloride  1,000 mls @ 125 mls/hr  08/16/20 09:00  08/16/20 10:30





  Nacl 0.45% 1000 Ml  IV   125 mls/hr





  AS DIRECT KATHYA   Administration


 


Methylprednisolone Sodium Succinate  40 mg  08/15/20 22:00  08/16/20 14:44





  Solu-Medrol  IV   40 mg





  Q8HR KATHYA   Administration


 


Miscellaneous Medication  325 mg  08/15/20 18:45 





  Paliperidone Palmitate [Invega Sustenna]  IM  





  Q3W KATHYA  


 


Ondansetron HCl  4 mg  08/14/20 08:00 





  Zofran  IV  





  Q8H PRN  





  Nausea And Vomiting  


 


Sodium Chloride  10 ml  08/14/20 10:00  08/16/20 09:12





  Sodium Chloride Flush Syringe 10 Ml  IV   10 ml





  BID KATHYA   Administration


 


Sodium Chloride  10 ml  08/14/20 07:50 





  Sodium Chloride Flush Syringe 10 Ml  IV  08/27/20 07:49 





  PRN PRN  





  LINE FLUSH  


 


Trazodone HCl  50 mg  08/14/20 22:00  08/15/20 22:00





  Desyrel  PO   Not Given





  QHS KATHYA

## 2020-08-16 NOTE — PROGRESS NOTE
Subjective





- Reason for Consult


Consult date: 08/16/20


Reason for consult: AMS, nonverbal





- Chief Complaint


Chief complaint: 


The patient's medical record was reviewed and the patient's progress was 

discussed with the nursing staff. The nurse at bedside states the patient is 

still not speaking and not really alert. The nurse note states while in CT, the 

patient became more restless, no change in inability with following commands, 

but concern for motion artifact. Dr. Velasquez notified, Ativan 1 mg IV ordered and 

administered while patient on CT table, with no improvement after several 

minutes. 





I attempted to interview the patient today, she is lying in bed, there is 

nursing staff and x-ray at bedside. The patient is not speaking, and appears 

drowsy.





REVIEW OF SYSTEMS


Unable to assess due to patient factors





MENTAL STATUS EXAMINATION


Unable to obtain





 ASSESSMENT 


Schizoaffective disorder





RECOMMENDATIONS


MEDICATIONS


     Continue current regimen


Risks, benefits and alternatives of medications discussed with the patient, 

questions answered and consent obtained from patient.


PSYCHOTHERAPY: Supportive psychotherapy provided


MEDICAL: Per primary team


DELIRIUM PRECAUTIONS: Please re-orient patient frequently, keep lights on during

the day, and minimize benzodiazepines and opiates as these medications could 

worsen patient's confusion.


SAFETY SITTER: per medical team


DISPOSITION: TBD, once and if the patient improves medically. She has acute and 

complex medical conditions that supersede her need for inpatient psychiatric 

treatment at this time.


LEGAL STATUS: Involuntary


Will continue to follow 


Thank you for the consult. Please contact with any questions and/or concerns.








Mental Status Exam





- Vital signs


                                Last Vital Signs











Temp  97.4 F L  08/16/20 08:00


 


Pulse  97 H  08/16/20 10:30


 


Resp  16   08/16/20 10:30


 


BP  93/60   08/16/20 10:30


 


Pulse Ox  97   08/16/20 10:30

## 2020-08-16 NOTE — XRAY REPORT
ABDOMEN 1 VIEW



INDICATION / CLINICAL INFORMATION:

ng tube placement.



COMPARISON: 

Abdominal radiograph from earlier today.



FINDINGS:



TUBES / LINES: Esophagogastric tube remains in stable position, with tip and sidehole projecting over
 the left upper quadrant of the abdomen.

BOWEL GAS PATTERN: No significant abnormality. 

FREE AIR / EXTRALUMINAL GAS: None seen.



ADDITIONAL FINDINGS: No significant additional findings.



IMPRESSION:

Esophagogastric tube remains in stable position.



Signer Name: Newton Milton MD 

Signed: 8/16/2020 11:57 AM

Workstation Name: [id number]

## 2020-08-16 NOTE — CAT SCAN REPORT
CT head/brain wo con



INDICATION / CLINICAL INFORMATION:

55 years Female; altered menal status. 



TECHNIQUE: Routine CT head without contrast. All CT scans at this location are performed using CT dos
e reduction for ALARA by means of automated exposure control. 



COMPARISON: 

The CT is compared to the previous exam of 7/30/2016.



FINDINGS:



BRAIN / INTRACRANIAL CONTENTS: The motion degrades the image quality. However, there appears be mild 
cerebral white matter disease most consistent with microvascular angiopathy. The findings appear to s
lightly progressed from the prior exam. There is also mild cerebral atrophy with associated mild prom
inence of the ventricular system. There is no clear CT evidence of acute intracranial hemorrhage or s
ignificant mass effect. 



ORBITS: No significant abnormality of visualized orbits.

SINUSES / MASTOIDS: There is continued complete opacification of the visualized left maxillary sinus 
with notable extension into the left ethmoid and frontal sinuses. There is also now complete opacific
ation of the left sphenoid sinus. There is presence of a nasogastric tube.



CRANIOCERVICAL JUNCTION: No significant abnormality.

ADDITIONAL FINDINGS: None. 



IMPRESSION:

1. There is mild microvascular angiopathy and cerebral atrophy without CT evidence of acute intracran
ial hemorrhage. 



Signer Name: Eric Best MD 

Signed: 8/16/2020 5:54 PM

Workstation Name: [id number]

## 2020-08-16 NOTE — XRAY REPORT
ABDOMEN 1 VIEW



INDICATION / CLINICAL INFORMATION:

NG tube placement.



COMPARISON: 

CT abdomen pelvis dated 8/14/2020.



FINDINGS:



TUBES / LINES: Esophagogastric tube tip and sidehole project over the left upper quadrant of the abdo
men.

BOWEL GAS PATTERN: No significant abnormality. 

FREE AIR / EXTRALUMINAL GAS: None seen.



ADDITIONAL FINDINGS: No significant additional findings.



IMPRESSION:

Esophagogastric tube tip and sidehole projecting over the left upper quadrant.



Signer Name: Newton Milton MD 

Signed: 8/16/2020 10:53 AM

Workstation Name: [email or web address] [id number]

## 2020-08-17 LAB
BUN SERPL-MCNC: 21 MG/DL (ref 7–17)
BUN/CREAT SERPL: 23 %
CALCIUM SERPL-MCNC: 7.7 MG/DL (ref 8.4–10.2)
HCT VFR BLD CALC: 37.1 % (ref 30.3–42.9)
HEMOLYSIS INDEX: 10
HGB BLD-MCNC: 12 GM/DL (ref 10.1–14.3)
MCHC RBC AUTO-ENTMCNC: 33 % (ref 30–34)
MCV RBC AUTO: 89 FL (ref 79–97)
PLATELET # BLD: 160 K/MM3 (ref 140–440)
RBC # BLD AUTO: 4.17 M/MM3 (ref 3.65–5.03)

## 2020-08-17 RX ADMIN — AZITHROMYCIN SCH MLS/HR: 500 INJECTION, POWDER, LYOPHILIZED, FOR SOLUTION INTRAVENOUS at 10:47

## 2020-08-17 RX ADMIN — METHYLPREDNISOLONE SODIUM SUCCINATE SCH MG: 40 INJECTION, POWDER, FOR SOLUTION INTRAMUSCULAR; INTRAVENOUS at 21:27

## 2020-08-17 RX ADMIN — Medication SCH ML: at 21:27

## 2020-08-17 RX ADMIN — HEPARIN SODIUM SCH UNIT: 5000 INJECTION, SOLUTION INTRAVENOUS; SUBCUTANEOUS at 13:16

## 2020-08-17 RX ADMIN — CEFTRIAXONE SODIUM SCH MLS/HR: 2 INJECTION, POWDER, FOR SOLUTION INTRAMUSCULAR; INTRAVENOUS at 10:16

## 2020-08-17 RX ADMIN — METHYLPREDNISOLONE SODIUM SUCCINATE SCH MG: 40 INJECTION, POWDER, FOR SOLUTION INTRAMUSCULAR; INTRAVENOUS at 13:16

## 2020-08-17 RX ADMIN — HALOPERIDOL SCH MG: 5 TABLET ORAL at 21:26

## 2020-08-17 RX ADMIN — ACETAMINOPHEN PRN MG: 325 TABLET ORAL at 05:30

## 2020-08-17 RX ADMIN — METHYLPREDNISOLONE SODIUM SUCCINATE SCH MG: 40 INJECTION, POWDER, FOR SOLUTION INTRAMUSCULAR; INTRAVENOUS at 05:06

## 2020-08-17 RX ADMIN — SODIUM CHLORIDE SCH MLS/HR: 0.45 INJECTION, SOLUTION INTRAVENOUS at 05:06

## 2020-08-17 RX ADMIN — ENOXAPARIN SODIUM SCH MG: 100 INJECTION SUBCUTANEOUS at 21:26

## 2020-08-17 RX ADMIN — HEPARIN SODIUM SCH UNIT: 5000 INJECTION, SOLUTION INTRAVENOUS; SUBCUTANEOUS at 05:06

## 2020-08-17 RX ADMIN — HALOPERIDOL SCH MG: 5 TABLET ORAL at 10:46

## 2020-08-17 RX ADMIN — Medication SCH ML: at 10:46

## 2020-08-17 NOTE — PROGRESS NOTE
<KWAKUPADMA LAURENMarco A - Last Filed: 08/17/20 15:40>





Assessment and Plan





- Patient Problems


(1) Severe sepsis


Current Visit: Yes   Status: Acute   


Plan to address problem: 


- Presented with low-grade fever, tachycardia, leukocytosis, WHITNEY, and bilateral 

pneumonia


- Likely source is bilateral pneumonia


- IV antibiotics


- Covid protocol initiated











(2) Pneumonia due to COVID-19 virus


Current Visit: Yes   Status: Acute   


Plan to address problem: 


- COVID protocol initiated


- IV azithromycin and Rocephin initiated


- 8/14 COVID PCR positive


- Trend LDH, CRP, procalcitonin, d-dimer, ferritin 


- Droplet /contact precautions


- Supplemental oxygenation as needed


- Infectious disease consult requested


- Pulmonary hygiene


- In setting of elevated D-Dimer, CTA Chest was ordered which was negative for 

pulmonary embolism


- Anticoagulation per COVID protocol


- Cannot initiate Remdesivir in setting on f elevated LFTs








(3) Neuroleptic malignant syndrome


Current Visit: Yes   Status: Ruled-out   


Plan to address problem: 


- Given catatonic state, elevated CK, AMS and antipsychotic medication use we 

will r/o malignant neuroleptic syndrome


- Neurology has been consulted; does not suspect NMS at this time


- 8/16 CTH shows mild microvascular angiopathy and cerebral atrophy without 

evidence of acute intracranial hemorrhage. 








(4) Rhabdomyolysis


Current Visit: Yes   Status: Acute   


Plan to address problem: 


- [place] [address], down to 2599


- Received 2 liters IVF in ED and NS then 1/2 NS  mL/hr


- Trend CK levels








(5) Acute psychosis


Current Visit: Yes   Status: Acute   


Plan to address problem: 


- Currently withdraws to pain however moving all extremities spontaneously


- History of paranoid schizophrenia


- Mental health consult requested








(6) Paranoid schizophrenia


Current Visit: Yes   Status: Acute   


Plan to address problem: 


-History of present schizophrenia


-Psych consulted and appreciate recommendations


-Depakote and Haldol 


-Sleep hygiene


-Reorientation as needed








(7) Acute renal failure


Current Visit: Yes   Status: Acute   


Plan to address problem: 


- Vasomotor nephropathy maybe secondary to dehydration or rhabdomyolysis 


- Received 2 L IVF in ED


- Admit Cr/BUN 1.4/33 but now trended down


- Avoid nephrotoxic medications


- Trend BMP








(8) Leukocytosis


Current Visit: Yes   Status: Acute   


Plan to address problem: 


- Admit WBC 13.8


- Trend CBC


- Maybe secondary to possible PNA or from dehydration


- IV abx


- Also on steroids for COVID pneumonia








(9) Thrombocytopenia


Current Visit: Yes   Status: Resolved   


Plan to address problem: 


-Admit Plt is 97 now up to 140K


- Bleeding precautions


- Trend CBC








(10) DVT prophylaxis


Current Visit: Yes   Status: Acute   


Plan to address problem: 


- SCDs to BLE while in bed


- Lovenox subq twice daily per COVID protocol








History


Interval history: 





This is a 55-year-old female with paranoid schizophrenia that presents to the 

emergency department on 8/13 for altered mental status in a catatonic state.  

Previous hospitalizations reviewed and only notes a history of paranoid 

schizophrenia. Patient may be a resident of [place].  Patient is currently 

nonverbal and HPI is received from ER documentation.  Work-up in the emergency 

department included a CT abdomen pelvis which shows patchy peripheral 

groundglass opacities bilateral lungs which is consistent with either viral or 

atypical pneumonia, positive anterior lateral fat hernia in the left mid abdomen

and a large uterine fibroids however her CXR showed no acute pulmonary or 

pleural abnormalities.  She was found to have acute kidney injury with a 

creatinine of 1.4/BUN 33 as baseline from previous records seems to be 0.7, rha

bdomyolysis with a creatinine kinase of 33,481, leukocytosis with a WBC of 13.8,

thrombocytopenia with platelets at 97.  In the emergency department she received

2 L of IV fluids and a dose of Zosyn.  She will be admitted to the hospitalist 

service as a COVID PUI given findings on CT with IV antibiotics, rhabdomyolysis,

and WHITNEY. Infectious disease, psych, and neurology have been consulted. Neurology

does not suspect NMS at this time. 





* Discussed with primary caretaker patient was normally verbal but sometimes 

  goes into a catatonic state.  Phone number for this is [phone]





* 8/16: Change fluids to NS 1/2 AT 125CC/hr. Rhabdomylysis improving, insert NGT

  and start tube feeds. Psych input noted. Continue management for COVID 19. ID 

  consulted. No new fever.  CTA and CTH obtained [facility] were both negative


* 8/15: COVID PCR Positive








Hospitalist Physical





- Constitutional


Vitals: 


                                        











Temp Pulse Resp BP Pulse Ox


 


 97.7 F   98 H  20   142/91   98 


 


 08/17/20 12:39  08/17/20 12:39  08/17/20 12:39  08/17/20 12:39  08/17/20 12:39











General appearance: Present: other (Patient slightly opens her eyes to tactile 

stimuli and withdraws to painful stimuli in all 4 extremities.  She is seen MCFADDEN 

spontaneously.)





- EENT


Eyes: Present: PERRL





- Neck


Neck: Present: normal ROM





- Respiratory


Respiratory effort: normal


Respiratory: bilateral: diminished





- Cardiovascular


Rhythm: regular


Heart Sounds: Present: S1 & S2.  Absent: systolic murmur, diastolic murmur





- Extremities


Extremities: no ischemia, pulses intact, pulses symmetrical, No edema, normal 

temperature, normal color


Peripheral Pulses: within normal limits





- Abdominal


General gastrointestinal: soft, tender, non-distended, normal bowel sounds





- Integumentary


Integumentary: Present: clear, warm, dry





- Psychiatric


Psychiatric: other (Patient withdraws to pain in all 4 extremities.  Slightly 

flickers eyes to painful stimuli)





- Neurologic


Neurologic: moves all extremities





- Allied Health


Allied health notes reviewed: nursing, social work, case management





Results





- Labs


CBC & Chem 7: 


                                 08/17/20 08:46





                                 08/17/20 08:46


Labs: 


                             Laboratory Last Values











WBC  16.2 K/mm3 (4.5-11.0)  H  08/17/20  08:46    


 


RBC  4.17 M/mm3 (3.65-5.03)   08/17/20  08:46    


 


Hgb  12.0 gm/dl (10.1-14.3)   08/17/20  08:46    


 


Hct  37.1 % (30.3-42.9)   08/17/20  08:46    


 


MCV  89 fl (79-97)   08/17/20  08:46    


 


MCH  29 pg (28-32)   08/17/20  08:46    


 


MCHC  33 % (30-34)   08/17/20  08:46    


 


RDW  16.1 % (13.2-15.2)  H  08/17/20  08:46    


 


Plt Count  160 K/mm3 (140-440)   08/17/20  08:46    


 


Lymph % (Auto)  10.6 % (13.4-35.0)  L  08/13/20  22:44    


 


Mono % (Auto)  9.8 % (0.0-7.3)  H  08/13/20  22:44    


 


Eos % (Auto)  0.0 % (0.0-4.3)   08/13/20  22:44    


 


Baso % (Auto)  1.2 % (0.0-1.8)   08/13/20  22:44    


 


Lymph #  1.5 K/mm3 (1.2-5.4)   08/13/20  22:44    


 


Mono #  1.3 K/mm3 (0.0-0.8)  H  08/13/20  22:44    


 


Eos #  0.0 K/mm3 (0.0-0.4)   08/13/20  22:44    


 


Baso #  0.2 K/mm3 (0.0-0.1)  H  08/13/20  22:44    


 


Add Manual Diff  Complete   08/15/20  05:08    


 


Total Counted  100   08/15/20  05:08    


 


Seg Neutrophils %  78.4 % (40.0-70.0)  H  08/13/20  22:44    


 


Seg Neuts % (Manual)  72.0 % (40.0-70.0)  H  08/15/20  05:08    


 


Band Neutrophils %  8.0 %  08/15/20  05:08    


 


Lymphocytes % (Manual)  13.0 % (13.4-35.0)  L  08/15/20  05:08    


 


Reactive Lymphs % (Man)  0 %  08/15/20  05:08    


 


Monocytes % (Manual)  7.0 % (0.0-7.3)   08/15/20  05:08    


 


Eosinophils % (Manual)  0 % (0.0-4.3)   08/15/20  05:08    


 


Basophils % (Manual)  0 % (0.0-1.8)   08/15/20  05:08    


 


Metamyelocytes %  0 %  08/15/20  05:08    


 


Myelocytes %  0 %  08/15/20  05:08    


 


Promyelocytes %  0 %  08/15/20  05:08    


 


Blast Cells %  0 %  08/15/20  05:08    


 


Nucleated RBC %  Not Reportable   08/15/20  05:08    


 


Seg Neutrophils #  10.8 K/mm3 (1.8-7.7)  H  08/13/20  22:44    


 


Seg Neutrophils # Man  7.5 K/mm3 (1.8-7.7)   08/15/20  05:08    


 


Band Neutrophils #  0.8 K/mm3  08/15/20  05:08    


 


Lymphocytes # (Manual)  1.4 K/mm3 (1.2-5.4)   08/15/20  05:08    


 


Abs React Lymphs (Man)  0.0 K/mm3  08/15/20  05:08    


 


Monocytes # (Manual)  0.7 K/mm3 (0.0-0.8)   08/15/20  05:08    


 


Eosinophils # (Manual)  0.0 K/mm3 (0.0-0.4)   08/15/20  05:08    


 


Basophils # (Manual)  0.0 K/mm3 (0.0-0.1)   08/15/20  05:08    


 


Metamyelocytes #  0.0 K/mm3  08/15/20  05:08    


 


Myelocytes #  0.0 K/mm3  08/15/20  05:08    


 


Promyelocytes #  0.0 K/mm3  08/15/20  05:08    


 


Blast Cells #  0.0 K/mm3  08/15/20  05:08    


 


WBC Morphology  Not Reportable   08/15/20  05:08    


 


Hypersegmented Neuts  Not Reportable   08/15/20  05:08    


 


Hyposegmented Neuts  Not Reportable   08/15/20  05:08    


 


Hypogranular Neuts  Not Reportable   08/15/20  05:08    


 


Smudge Cells  Not Reportable   08/15/20  05:08    


 


Toxic Granulation  Not Reportable   08/15/20  05:08    


 


Toxic Vacuolation  Not Reportable   08/15/20  05:08    


 


Dohle Bodies  Not Reportable   08/15/20  05:08    


 


Pelger-Huet Anomaly  Not Reportable   08/15/20  05:08    


 


Arnie Rods  Not Reportable   08/15/20  05:08    


 


Platelet Estimate  Consistent w auto   08/15/20  05:08    


 


Clumped Platelets  Not Reportable   08/15/20  05:08    


 


Plt Clumps, EDTA  Not Reportable   08/15/20  05:08    


 


Large Platelets  Not Reportable   08/15/20  05:08    


 


Giant Platelets  Not Reportable   08/15/20  05:08    


 


Platelet Satelliting  Not Reportable   08/15/20  05:08    


 


Plt Morphology Comment  Not Reportable   08/15/20  05:08    


 


RBC Morphology  Not Reportable   08/15/20  05:08    


 


Dimorphic RBCs  Not Reportable   08/15/20  05:08    


 


Polychromasia  Not Reportable   08/15/20  05:08    


 


Hypochromasia  Not Reportable   08/15/20  05:08    


 


Poikilocytosis  Not Reportable   08/15/20  05:08    


 


Anisocytosis  1+   08/15/20  05:08    


 


Microcytosis  Not Reportable   08/15/20  05:08    


 


Macrocytosis  Not Reportable   08/15/20  05:08    


 


Spherocytes  Not Reportable   08/15/20  05:08    


 


Pappenheimer Bodies  Not Reportable   08/15/20  05:08    


 


Sickle Cells  Not Reportable   08/15/20  05:08    


 


Target Cells  Not Reportable   08/15/20  05:08    


 


Tear Drop Cells  Not Reportable   08/15/20  05:08    


 


Ovalocytes  Not Reportable   08/15/20  05:08    


 


Helmet Cells  Not Reportable   08/15/20  05:08    


 


[place] Bodies  Not Reportable   08/15/20  05:08    


 


Cabot Rings  Not Reportable   08/15/20  05:08    


 


Jd Cells  Not Reportable   08/15/20  05:08    


 


Bite Cells  Not Reportable   08/15/20  05:08    


 


Crenated Cell  Not Reportable   08/15/20  05:08    


 


Elliptocytes  Not Reportable   08/15/20  05:08    


 


Acanthocytes (Spur)  Not Reportable   08/15/20  05:08    


 


Rouleaux  Not Reportable   08/15/20  05:08    


 


Hemoglobin C Crystals  Not Reportable   08/15/20  05:08    


 


Schistocytes  Not Reportable   08/15/20  05:08    


 


Malaria parasites  Not Reportable   08/15/20  05:08    


 


[place] Bodies  Not Reportable   08/15/20  05:08    


 


Hem Pathologist Commnt  No   08/15/20  05:08    


 


D-Dimer  > 24037 ng/mlDDU (0-234)  H  08/14/20  09:16    


 


Sodium  147 mmol/L (137-145)  H  08/17/20  08:46    


 


Potassium  3.9 mmol/L (3.6-5.0)   08/17/20  08:46    


 


Chloride  110.4 mmol/L ()  H  08/17/20  08:46    


 


Carbon Dioxide  22 mmol/L (22-30)   08/17/20  08:46    


 


Anion Gap  19 mmol/L  08/17/20  08:46    


 


BUN  21 mg/dL (7-17)  H  08/17/20  08:46    


 


Creatinine  0.9 mg/dL (0.6-1.2)   08/17/20  08:46    


 


Estimated GFR  > 60 ml/min  08/17/20  08:46    


 


BUN/Creatinine Ratio  23 %  08/17/20  08:46    


 


Glucose  170 mg/dL ()  H  08/17/20  08:46    


 


Lactic Acid  1.30 mmol/L (0.7-2.0)   08/13/20  22:44    


 


Calcium  7.7 mg/dL (8.4-10.2)  L  08/17/20  08:46    


 


Ferritin  962.2 ng/mL (10.0-200.0)  H  08/14/20  09:16    


 


Total Bilirubin  0.40 mg/dL (0.1-1.2)   08/16/20  03:58    


 


Direct Bilirubin  0.3 mg/dL (0-0.2)  H  08/13/20  22:44    


 


Indirect Bilirubin  0.2 mg/dL  08/13/20  22:44    


 


AST  351 units/L (5-40)  H  08/16/20  03:58    


 


ALT  115 units/L (7-56)  H  08/16/20  03:58    


 


Alkaline Phosphatase  39 units/L ()   08/16/20  03:58    


 


Lactate Dehydrogenase  1277 units/L ()  H  08/14/20  09:16    


 


Total Creatine Kinase  2599 units/L ()  H  08/17/20  08:46    


 


C-Reactive Protein  9.40 mg/dL (0.00-1.30)  H  08/14/20  09:16    


 


Total Protein  6.8 g/dL (6.3-8.2)   08/16/20  03:58    


 


Albumin  2.9 g/dL (3.9-5)  L  08/16/20  03:58    


 


Albumin/Globulin Ratio  0.7 %  08/16/20  03:58    


 


Procalcitonin  9.50 ng/mL (<0.15)   08/14/20  09:16    


 


Urine Color  Tiffany  (Yellow)   08/14/20  Unknown


 


Urine Turbidity  Slightly-cloudy  (Clear)   08/14/20  Unknown


 


Urine pH  5.0  (5.0-7.0)   08/14/20  Unknown


 


Ur Specific Gravity  1.026  (1.003-1.030)   08/14/20  Unknown


 


Urine Protein  100 mg/dl mg/dL (Negative)   08/14/20  Unknown


 


Urine Glucose (UA)  Neg mg/dL (Negative)   08/14/20  Unknown


 


Urine Ketones  Tr mg/dL (Negative)   08/14/20  Unknown


 


Urine Blood  Lg  (Negative)   08/14/20  Unknown


 


Urine Nitrite  Neg  (Negative)   08/14/20  Unknown


 


Urine Bilirubin  Neg  (Negative)   08/14/20  Unknown


 


Urine Urobilinogen  2.0 mg/dL (<2.0)   08/14/20  Unknown


 


Ur Leukocyte Esterase  Neg  (Negative)   08/14/20  Unknown


 


Urine WBC (Auto)  17.0 /HPF (0.0-6.0)  H  08/14/20  Unknown


 


Urine RBC (Auto)  1.0 /HPF (0.0-6.0)   08/14/20  Unknown


 


U Epithel Cells (Auto)  4.0 /HPF (0-13.0)   08/14/20  Unknown


 


Urine Mucus  2+ /HPF  08/14/20  Unknown


 


Salicylates  < 0.3 mg/dL (2.8-20.0)  L  08/13/20  22:44    


 


Urine Opiates Screen  Presumptive negative   08/14/20  Unknown


 


Urine Methadone Screen  Presumptive negative   08/14/20  Unknown


 


Acetaminophen  5.0 ug/mL (10.0-30.0)  L  08/13/20  22:44    


 


Ur Barbiturates Screen  Presumptive negative   08/14/20  Unknown


 


Valproic Acid  31.8 ug/mL ()  L  08/17/20  08:46    


 


Ur Phencyclidine Scrn  Presumptive negative   08/14/20  Unknown


 


Ur Amphetamines Screen  Presumptive negative   08/14/20  Unknown


 


U Benzodiazepines Scrn  Presumptive negative   08/14/20  Unknown


 


Urine Cocaine Screen  Presumptive negative   08/14/20  Unknown


 


U Marijuana (THC) Screen  Presumptive negative   08/14/20  Unknown


 


Drugs of Abuse Note  Disclamer   08/14/20  Unknown


 


Plasma/Serum Alcohol  < 0.01 % (0-0.07)   08/13/20  22:44    


 


Coronavirus (PCR)  Positive  (Negative)  A  08/15/20  Unknown











Microbiology: 


Microbiology





08/14/20 Unknown   Urine,Clean Catch   Urine Culture - Final


                                NO GROWTH AFTER 48 HOURS


08/14/20 01:40   Peripheral/Venous   Blood Culture - Preliminary


                            NO GROWTH AFTER 72 HOURS


08/14/20 01:59   Peripheral/Venous   Blood Culture - Preliminary


                            NO GROWTH AFTER 72 HOURS








Schmidt/IV: 


                                        





Voiding Method                   External Female Catheter


IV Catheter Type [Right Upper    INT / Saline Lock


arm]                             


IV Catheter Type [Right          INT / Saline Lock


Antecubital]                     


IV Catheter Type [Right Foot]    INT / Saline Lock


IV Catheter Type [Right          Peripheral IV


External Jugular]                











Active Medications





- Current Medications


Current Medications: 














Generic Name Dose Route Start Last Admin





  Trade Name Freq  PRN Reason Stop Dose Admin


 


Acetaminophen  650 mg  08/14/20 08:00  08/17/20 05:30





  Tylenol  PO   650 mg





  Q4H PRN   Administration





  Pain MILD(1-3)/Fever >100.5/HA  


 


Lipase/Protease/Amylase  1 each  08/17/20 10:34 





  Pancremarilin Clay 10,500 Unit  FEEDTUBE  





  PRN PRN  





  For Clogged Feeding Tube  


 


Divalproex Sodium  1,500 mg  08/17/20 22:00 





  Depakote   PO  





  QHS KATHYA  


 


Haloperidol  5 mg  08/15/20 12:00  08/17/20 10:46





  Haldol  PO   5 mg





  BID KATHYA   Administration


 


Heparin Sodium (Porcine)  5,000 unit  08/14/20 10:00  08/17/20 13:16





  Heparin  SUB-Q   5,000 unit





  Q8HR KATHYA   Administration


 


Azithromycin 500 mg/ Sodium  250 mls @ 250 mls/hr  08/14/20 10:00  08/17/20 

10:47





  Chloride  IV  08/18/20 10:59  250 mls/hr





  Q24HR KATHYA   Administration





  Protocol  


 


Ceftriaxone Sodium  2 gm in 100 mls @ 200 mls/hr  08/14/20 10:00  08/17/20 10:16





  Rocephin/Ns 2 Gm/100 Ml  IV   200 mls/hr





  Q24HR KATHYA   Administration





  Protocol  


 


Methylprednisolone Sodium Succinate  40 mg  08/15/20 22:00  08/17/20 13:16





  Solu-Medrol  IV   40 mg





  Q8HR KATHYA   Administration


 


Miscellaneous Medication  325 mg  08/15/20 18:45 





  Paliperidone Palmitate [Invega Sustenna]  IM  





  Q3W KATHYA  


 


Ondansetron HCl  4 mg  08/14/20 08:00 





  Zofran  IV  





  Q8H PRN  





  Nausea And Vomiting  


 


Simple Syrup  15 ml  08/17/20 10:34 





  Simple Syrup  FEEDTUBE  





  PRN PRN  





  Hypoglycemia  


 


Simple Syrup  30 ml  08/17/20 10:34 





  Simple Syrup  FEEDTUBE  





  PRN PRN  





  Hypoglycemia  


 


Sodium Bicarbonate  325 mg  08/17/20 10:34 





  Sodium Bicarbonate  FEEDTUBE  





  PRN PRN  





  For Clogged Feeding Tube  


 


Sodium Chloride  10 ml  08/14/20 10:00  08/17/20 10:46





  Sodium Chloride Flush Syringe 10 Ml  IV   10 ml





  BID KATHYA   Administration


 


Sodium Chloride  10 ml  08/14/20 07:50 





  Sodium Chloride Flush Syringe 10 Ml  IV  08/27/20 07:49 





  PRN PRN  





  LINE FLUSH  


 


Trazodone HCl  50 mg  08/14/20 22:00  08/16/20 22:21





  Desyrel  PO   50 mg





  QHS KATHYA   Administration














Nutrition/Malnutrition Assess





- Dietary Evaluation


Nutrition/Malnutrition Findings: 


                                        





Nutrition Notes                                            Start:  08/17/20 

09:33


Freq:                                                      Status: Active       




Protocol:                                                                       




 Document     08/17/20 09:34  LP  (Rec: 08/17/20 10:31  LP  [id number])


 Nutrition Notes


     Need for Assessment generated from:         MD Order


     Initial or Follow up                        Assessment


     Current Diagnosis                           Acute Kidney Injury,Sepsis


     Other Pertinent Diagnosis                   COVID-19 (+), Schizoaffective


                                                 disorder


     Current Diet                                No diet


     Labs/Tests                                  Na 150


                                                 


     Pertinent Medications                       Solumedrol


     Height                                      5 ft 6 in


     Weight                                      114 kg


     Ideal Body Weight (kg)                      59.09


     BMI                                         40.5


     Weight Status                               Morbidly Obese


     Subjective/Other Information                Consult for TF. Pt has NGT. Pt


                                                 With AMS and non-verbal.


     Burn                                        Absent


     Trauma                                      Absent


     GI Symptoms                                 None


     Difficulty In                               Swallowing


     Food Allergy                                No


     Current % PO                                Negligible


     Minimum of two criteria                     No


     Reduced  Strength                       Measurably Reduced (severe)


     #1


      Nutrition Diagnosis                        Inadequate oral intake


      Etiology                                   Swallowing difficulty and AMS


      As Evidenced by Signs and Symptoms         Pt noted with decreased


                                                 responsiveness and NGT placed


     Is patient on ventilator?                   No


     Is Patient Ambulatory and/or Out of Bed     No


     [facility]-([facility]-confined to bed)      2106.012


     Kcal/Kg value to use for calculation        14


     Approximate Energy Requirements Using       1596


      kcal/Kg                                    


     Calculation Used for Recommendations        Kcal/kg


     Additional Notes                            Protein needs are 69-87g (0.8-


                                                 1g/kg adjusted wt 86.5kg)


                                                 Fluid needs are 1ml/kcal


 Nutrition Intervention


     Change Diet Order:                          TF


     Nutrition Support:                          Jevity 1.2 at 55ml/hr


                                                 Flush with 150ml q4h


     Kcal                                        1,584


     Protein (gm)                                73


     Fluid (mL)                                  1,065


     Goal #1                                     Meet at least 80% of kcal and


                                                 protein needs via TF


     Anticipated Discharge Needs:                Unable to determine at this


                                                 time


     Follow-Up By:                               08/19/20


     Additional Comments                         Follow for TF start/tolerance











<CASA ULRICH - Last Filed: 08/17/20 16:04>





Assessment and Plan


Assessment and plan: 


I saw and evaluated the patient. I agree with the findings and the plan of care 

as documented in the Nurse Practitioner's~note, with the following corrections 

and additions.


Acute kidney injury  secondary to vasomotor nephropathy now improved.





Hospitalist Physical





- Constitutional


Vitals: 


                                        











Temp Pulse Resp BP Pulse Ox


 


 97.7 F   98 H  20   142/91   98 


 


 08/17/20 12:39  08/17/20 12:39  08/17/20 12:39  08/17/20 12:39  08/17/20 12:39














Results





- Labs


CBC & Chem 7: 


                                 08/17/20 08:46





                                 08/17/20 08:46


Labs: 


                             Laboratory Last Values











WBC  16.2 K/mm3 (4.5-11.0)  H  08/17/20  08:46    


 


RBC  4.17 M/mm3 (3.65-5.03)   08/17/20  08:46    


 


Hgb  12.0 gm/dl (10.1-14.3)   08/17/20  08:46    


 


Hct  37.1 % (30.3-42.9)   08/17/20  08:46    


 


MCV  89 fl (79-97)   08/17/20  08:46    


 


MCH  29 pg (28-32)   08/17/20  08:46    


 


MCHC  33 % (30-34)   08/17/20  08:46    


 


RDW  16.1 % (13.2-15.2)  H  08/17/20  08:46    


 


Plt Count  160 K/mm3 (140-440)   08/17/20  08:46    


 


Lymph % (Auto)  10.6 % (13.4-35.0)  L  08/13/20  22:44    


 


Mono % (Auto)  9.8 % (0.0-7.3)  H  08/13/20  22:44    


 


Eos % (Auto)  0.0 % (0.0-4.3)   08/13/20  22:44    


 


Baso % (Auto)  1.2 % (0.0-1.8)   08/13/20  22:44    


 


Lymph #  1.5 K/mm3 (1.2-5.4)   08/13/20  22:44    


 


Mono #  1.3 K/mm3 (0.0-0.8)  H  08/13/20  22:44    


 


Eos #  0.0 K/mm3 (0.0-0.4)   08/13/20  22:44    


 


Baso #  0.2 K/mm3 (0.0-0.1)  H  08/13/20  22:44    


 


Add Manual Diff  Complete   08/15/20  05:08    


 


Total Counted  100   08/15/20  05:08    


 


Seg Neutrophils %  78.4 % (40.0-70.0)  H  08/13/20  22:44    


 


Seg Neuts % (Manual)  72.0 % (40.0-70.0)  H  08/15/20  05:08    


 


Band Neutrophils %  8.0 %  08/15/20  05:08    


 


Lymphocytes % (Manual)  13.0 % (13.4-35.0)  L  08/15/20  05:08    


 


Reactive Lymphs % (Man)  0 %  08/15/20  05:08    


 


Monocytes % (Manual)  7.0 % (0.0-7.3)   08/15/20  05:08    


 


Eosinophils % (Manual)  0 % (0.0-4.3)   08/15/20  05:08    


 


Basophils % (Manual)  0 % (0.0-1.8)   08/15/20  05:08    


 


Metamyelocytes %  0 %  08/15/20  05:08    


 


Myelocytes %  0 %  08/15/20  05:08    


 


Promyelocytes %  0 %  08/15/20  05:08    


 


Blast Cells %  0 %  08/15/20  05:08    


 


Nucleated RBC %  Not Reportable   08/15/20  05:08    


 


Seg Neutrophils #  10.8 K/mm3 (1.8-7.7)  H  08/13/20  22:44    


 


Seg Neutrophils # Man  7.5 K/mm3 (1.8-7.7)   08/15/20  05:08    


 


Band Neutrophils #  0.8 K/mm3  08/15/20  05:08    


 


Lymphocytes # (Manual)  1.4 K/mm3 (1.2-5.4)   08/15/20  05:08    


 


Abs React Lymphs (Man)  0.0 K/mm3  08/15/20  05:08    


 


Monocytes # (Manual)  0.7 K/mm3 (0.0-0.8)   08/15/20  05:08    


 


Eosinophils # (Manual)  0.0 K/mm3 (0.0-0.4)   08/15/20  05:08    


 


Basophils # (Manual)  0.0 K/mm3 (0.0-0.1)   08/15/20  05:08    


 


Metamyelocytes #  0.0 K/mm3  08/15/20  05:08    


 


Myelocytes #  0.0 K/mm3  08/15/20  05:08    


 


Promyelocytes #  0.0 K/mm3  08/15/20  05:08    


 


Blast Cells #  0.0 K/mm3  08/15/20  05:08    


 


WBC Morphology  Not Reportable   08/15/20  05:08    


 


Hypersegmented Neuts  Not Reportable   08/15/20  05:08    


 


Hyposegmented Neuts  Not Reportable   08/15/20  05:08    


 


Hypogranular Neuts  Not Reportable   08/15/20  05:08    


 


Smudge Cells  Not Reportable   08/15/20  05:08    


 


Toxic Granulation  Not Reportable   08/15/20  05:08    


 


Toxic Vacuolation  Not Reportable   08/15/20  05:08    


 


Dohle Bodies  Not Reportable   08/15/20  05:08    


 


Pelger-Huet Anomaly  Not Reportable   08/15/20  05:08    


 


Arnie Rods  Not Reportable   08/15/20  05:08    


 


Platelet Estimate  Consistent w auto   08/15/20  05:08    


 


Clumped Platelets  Not Reportable   08/15/20  05:08    


 


Plt Clumps, EDTA  Not Reportable   08/15/20  05:08    


 


Large Platelets  Not Reportable   08/15/20  05:08    


 


Giant Platelets  Not Reportable   08/15/20  05:08    


 


Platelet Satelliting  Not Reportable   08/15/20  05:08    


 


Plt Morphology Comment  Not Reportable   08/15/20  05:08    


 


RBC Morphology  Not Reportable   08/15/20  05:08    


 


Dimorphic RBCs  Not Reportable   08/15/20  05:08    


 


Polychromasia  Not Reportable   08/15/20  05:08    


 


Hypochromasia  Not Reportable   08/15/20  05:08    


 


Poikilocytosis  Not Reportable   08/15/20  05:08    


 


Anisocytosis  1+   08/15/20  05:08    


 


Microcytosis  Not Reportable   08/15/20  05:08    


 


Macrocytosis  Not Reportable   08/15/20  05:08    


 


Spherocytes  Not Reportable   08/15/20  05:08    


 


Pappenheimer Bodies  Not Reportable   08/15/20  05:08    


 


Sickle Cells  Not Reportable   08/15/20  05:08    


 


Target Cells  Not Reportable   08/15/20  05:08    


 


Tear Drop Cells  Not Reportable   08/15/20  05:08    


 


Ovalocytes  Not Reportable   08/15/20  05:08    


 


Helmet Cells  Not Reportable   08/15/20  05:08    


 


[place] Bodies  Not Reportable   08/15/20  05:08    


 


Cabot Rings  Not Reportable   08/15/20  05:08    


 


Jd Cells  Not Reportable   08/15/20  05:08    


 


Bite Cells  Not Reportable   08/15/20  05:08    


 


Crenated Cell  Not Reportable   08/15/20  05:08    


 


Elliptocytes  Not Reportable   08/15/20  05:08    


 


Acanthocytes (Spur)  Not Reportable   08/15/20  05:08    


 


Rouleaux  Not Reportable   08/15/20  05:08    


 


Hemoglobin C Crystals  Not Reportable   08/15/20  05:08    


 


Schistocytes  Not Reportable   08/15/20  05:08    


 


Malaria parasites  Not Reportable   08/15/20  05:08    


 


[place] Bodies  Not Reportable   08/15/20  05:08    


 


Hem Pathologist Commnt  No   08/15/20  05:08    


 


D-Dimer  > 94798 ng/mlDDU (0-234)  H  08/14/20  09:16    


 


Sodium  147 mmol/L (137-145)  H  08/17/20  08:46    


 


Potassium  3.9 mmol/L (3.6-5.0)   08/17/20  08:46    


 


Chloride  110.4 mmol/L ()  H  08/17/20  08:46    


 


Carbon Dioxide  22 mmol/L (22-30)   08/17/20  08:46    


 


Anion Gap  19 mmol/L  08/17/20  08:46    


 


BUN  21 mg/dL (7-17)  H  08/17/20  08:46    


 


Creatinine  0.9 mg/dL (0.6-1.2)   08/17/20  08:46    


 


Estimated GFR  > 60 ml/min  08/17/20  08:46    


 


BUN/Creatinine Ratio  23 %  08/17/20  08:46    


 


Glucose  170 mg/dL ()  H  08/17/20  08:46    


 


Lactic Acid  1.30 mmol/L (0.7-2.0)   08/13/20  22:44    


 


Calcium  7.7 mg/dL (8.4-10.2)  L  08/17/20  08:46    


 


Ferritin  962.2 ng/mL (10.0-200.0)  H  08/14/20  09:16    


 


Total Bilirubin  0.40 mg/dL (0.1-1.2)   08/16/20  03:58    


 


Direct Bilirubin  0.3 mg/dL (0-0.2)  H  08/13/20  22:44    


 


Indirect Bilirubin  0.2 mg/dL  08/13/20  22:44    


 


AST  351 units/L (5-40)  H  08/16/20  03:58    


 


ALT  115 units/L (7-56)  H  08/16/20  03:58    


 


Alkaline Phosphatase  39 units/L ()   08/16/20  03:58    


 


Lactate Dehydrogenase  1277 units/L ()  H  08/14/20  09:16    


 


Total Creatine Kinase  2599 units/L ()  H  08/17/20  08:46    


 


C-Reactive Protein  9.40 mg/dL (0.00-1.30)  H  08/14/20  09:16    


 


Total Protein  6.8 g/dL (6.3-8.2)   08/16/20  03:58    


 


Albumin  2.9 g/dL (3.9-5)  L  08/16/20  03:58    


 


Albumin/Globulin Ratio  0.7 %  08/16/20  03:58    


 


Procalcitonin  9.50 ng/mL (<0.15)   08/14/20  09:16    


 


Urine Color  Tiffany  (Yellow)   08/14/20  Unknown


 


Urine Turbidity  Slightly-cloudy  (Clear)   08/14/20  Unknown


 


Urine pH  5.0  (5.0-7.0)   08/14/20  Unknown


 


Ur Specific Gravity  1.026  (1.003-1.030)   08/14/20  Unknown


 


Urine Protein  100 mg/dl mg/dL (Negative)   08/14/20  Unknown


 


Urine Glucose (UA)  Neg mg/dL (Negative)   08/14/20  Unknown


 


Urine Ketones  Tr mg/dL (Negative)   08/14/20  Unknown


 


Urine Blood  Lg  (Negative)   08/14/20  Unknown


 


Urine Nitrite  Neg  (Negative)   08/14/20  Unknown


 


Urine Bilirubin  Neg  (Negative)   08/14/20  Unknown


 


Urine Urobilinogen  2.0 mg/dL (<2.0)   08/14/20  Unknown


 


Ur Leukocyte Esterase  Neg  (Negative)   08/14/20  Unknown


 


Urine WBC (Auto)  17.0 /HPF (0.0-6.0)  H  08/14/20  Unknown


 


Urine RBC (Auto)  1.0 /HPF (0.0-6.0)   08/14/20  Unknown


 


U Epithel Cells (Auto)  4.0 /HPF (0-13.0)   08/14/20  Unknown


 


Urine Mucus  2+ /HPF  08/14/20  Unknown


 


Salicylates  < 0.3 mg/dL (2.8-20.0)  L  08/13/20  22:44    


 


Urine Opiates Screen  Presumptive negative   08/14/20  Unknown


 


Urine Methadone Screen  Presumptive negative   08/14/20  Unknown


 


Acetaminophen  5.0 ug/mL (10.0-30.0)  L  08/13/20  22:44    


 


Ur Barbiturates Screen  Presumptive negative   08/14/20  Unknown


 


Valproic Acid  31.8 ug/mL ()  L  08/17/20  08:46    


 


Ur Phencyclidine Scrn  Presumptive negative   08/14/20  Unknown


 


Ur Amphetamines Screen  Presumptive negative   08/14/20  Unknown


 


U Benzodiazepines Scrn  Presumptive negative   08/14/20  Unknown


 


Urine Cocaine Screen  Presumptive negative   08/14/20  Unknown


 


U Marijuana (THC) Screen  Presumptive negative   08/14/20  Unknown


 


Drugs of Abuse Note  Disclamer   08/14/20  Unknown


 


Plasma/Serum Alcohol  < 0.01 % (0-0.07)   08/13/20  22:44    


 


Coronavirus (PCR)  Positive  (Negative)  A  08/15/20  Unknown











Microbiology: 


Microbiology





08/14/20 Unknown   Urine,Clean Catch   Urine Culture - Final


                                NO GROWTH AFTER 48 HOURS


08/14/20 01:40   Peripheral/Venous   Blood Culture - Preliminary


                            NO GROWTH AFTER 72 HOURS


08/14/20 01:59   Peripheral/Venous   Blood Culture - Preliminary


                            NO GROWTH AFTER 72 HOURS








Schmidt/IV: 


                                        





Voiding Method                   External Female Catheter


IV Catheter Type [Right Upper    INT / Saline Lock


arm]                             


IV Catheter Type [Right          INT / Saline Lock


Antecubital]                     


IV Catheter Type [Right Foot]    INT / Saline Lock


IV Catheter Type [Right          Peripheral IV


External Jugular]                











Active Medications





- Current Medications


Current Medications: 














Generic Name Dose Route Start Last Admin





  Trade Name Freq  PRN Reason Stop Dose Admin


 


Acetaminophen  650 mg  08/14/20 08:00  08/17/20 05:30





  Tylenol  PO   650 mg





  Q4H PRN   Administration





  Pain MILD(1-3)/Fever >100.5/HA  


 


Lipase/Protease/Amylase  1 each  08/17/20 10:34 





  Pancreazkim Clay 10,500 Unit  FEEDTUBE  





  PRN PRN  





  For Clogged Feeding Tube  


 


Divalproex Sodium  1,500 mg  08/17/20 22:00 





  Depakote Dr  PO  





  QHS [facility]  


 


Enoxaparin Sodium  40 mg  08/17/20 22:00 





  Enoxaparin  SUB-Q  





  BID [facility]  


 


Haloperidol  5 mg  08/15/20 12:00  08/17/20 10:46





  Haldol  PO   5 mg





  BID [facility]   Administration


 


Azithromycin 500 mg/ Sodium  250 mls @ 250 mls/hr  08/14/20 10:00  08/17/20 

10:47





  Chloride  IV  08/18/20 10:59  250 mls/hr





  Q24HR [facility]   Administration





  Protocol  


 


Ceftriaxone Sodium  2 gm in 100 mls @ 200 mls/hr  08/14/20 10:00  08/17/20 10:16





  Rocephin/Ns 2 Gm/100 Ml  IV   200 mls/hr





  Q24HR [facility]   Administration





  Protocol  


 


Methylprednisolone Sodium Succinate  40 mg  08/15/20 22:00  08/17/20 13:16





  Solu-Medrol  IV   40 mg





  Q8HR [facility]   Administration


 


Miscellaneous Medication  325 mg  08/15/20 18:45 





  Paliperidone Palmitate [Invega Sustenna]  IM  





  Q3W [facility]  


 


Ondansetron HCl  4 mg  08/14/20 08:00 





  Zofran  IV  





  Q8H PRN  





  Nausea And Vomiting  


 


Simple Syrup  15 ml  08/17/20 10:34 





  Simple Syrup  FEEDTUBE  





  PRN PRN  





  Hypoglycemia  


 


Simple Syrup  30 ml  08/17/20 10:34 





  Simple Syrup  FEEDTUBE  





  PRN PRN  





  Hypoglycemia  


 


Sodium Bicarbonate  325 mg  08/17/20 10:34 





  Sodium Bicarbonate  FEEDTUBE  





  PRN PRN  





  For Clogged Feeding Tube  


 


Sodium Chloride  10 ml  08/14/20 10:00  08/17/20 10:46





  Sodium Chloride Flush Syringe 10 Ml  IV   10 ml





  BID [facility]   Administration


 


Sodium Chloride  10 ml  08/14/20 07:50 





  Sodium Chloride Flush Syringe 10 Ml  IV  08/27/20 07:49 





  PRN PRN  





  LINE FLUSH  


 


Trazodone HCl  50 mg  08/14/20 22:00  08/16/20 22:21





  Desyrel  PO   50 mg





  QHS [facility]   Administration














Nutrition/Malnutrition Assess





- Dietary Evaluation


Nutrition/Malnutrition Findings: 


                                        





Nutrition Notes                                            Start:  08/17/20 

09:33


Freq:                                                      Status: Active       




Protocol:                                                                       




 Document     08/17/20 09:34  LP  (Rec: 08/17/20 10:31  LP  [id number])


 Nutrition Notes


     Need for Assessment generated from:         MD Order


     Initial or Follow up                        Assessment


     Current Diagnosis                           Acute Kidney Injury,Sepsis


     Other Pertinent Diagnosis                   COVID-19 (+), Schizoaffective


                                                 disorder


     Current Diet                                No diet


     Labs/Tests                                  Na 150


                                                 


     Pertinent Medications                       Solumedrol


     Height                                      5 ft 6 in


     Weight                                      114 kg


     Ideal Body Weight (kg)                      59.09


     BMI                                         40.5


     Weight Status                               Morbidly Obese


     Subjective/Other Information                Consult for TF. Pt has NGT. Pt


                                                 With AMS and non-verbal.


     Burn                                        Absent


     Trauma                                      Absent


     GI Symptoms                                 None


     Difficulty In                               Swallowing


     Food Allergy                                No


     Current % PO                                Negligible


     Minimum of two criteria                     No


     Reduced  Strength                       Measurably Reduced (severe)


     #1


      Nutrition Diagnosis                        Inadequate oral intake


      Etiology                                   Swallowing difficulty and AMS


      As Evidenced by Signs and Symptoms         Pt noted with decreased


                                                 responsiveness and NGT placed


     Is patient on ventilator?                   No


     Is Patient Ambulatory and/or Out of Bed     No


     REE-([facility]-confined to bed)      2106.012


     Kcal/Kg value to use for calculation        14


     Approximate Energy Requirements Using       1596


      kcal/Kg                                    


     Calculation Used for Recommendations        Kcal/kg


     Additional Notes                            Protein needs are 69-87g (0.8-


                                                 1g/kg adjusted wt 86.5kg)


                                                 Fluid needs are 1ml/kcal


 Nutrition Intervention


     Change Diet Order:                          TF


     Nutrition Support:                          Jevity 1.2 at 55ml/hr


                                                 Flush with 150ml q4h


     Kcal                                        1,584


     Protein (gm)                                73


     Fluid (mL)                                  1,065


     Goal #1                                     Meet at least 80% of kcal and


                                                 protein needs via TF


     Anticipated Discharge Needs:                Unable to determine at this


                                                 time


     Follow-Up By:                               08/19/20


     Additional Comments                         Follow for TF start/tolerance

## 2020-08-17 NOTE — PROGRESS NOTE
Subjective





- Reason for Consult


Consult date: 08/17/20


Reason for consult: nonverbal, AMS





- Chief Complaint


Chief complaint: 


The patient's medical record was reviewed and the patient's progress was 

discussed with the nursing staff. The nurse note states the patient is restless 

with eyes closed. The patient is moving around in bed and pulling on soft wrist 

restraints. The patient is nonverbal and not following commands. Pt instructed 

to open her eyes but did not. . 





I attempted to interview the patient today, she is lying in bed, she is moving 

her legs about. She does not respond or acknowledge me when I call her name. 





REVIEW OF SYSTEMS


Unable to assess due to patient factors





MENTAL STATUS EXAMINATION


Unable to obtain





 ASSESSMENT 


Schizoaffective disorder





RECOMMENDATIONS


MEDICATIONS


     Continue current regimen


Risks, benefits and alternatives of medications discussed with the patient, 

questions answered and consent obtained from patient.


PSYCHOTHERAPY: Supportive psychotherapy provided


MEDICAL: Per primary team


DELIRIUM PRECAUTIONS: Please re-orient patient frequently, keep lights on during

the day, and minimize benzodiazepines and opiates as these medications could 

worsen patient's confusion.


SAFETY SITTER: per medical team


DISPOSITION: TBD, once and if the patient improves medically. She has acute and 

complex medical conditions that supersede her need for inpatient psychiatric 

treatment at this time.


LEGAL STATUS: Involuntary


Will continue to follow 


Thank you for the consult. Please contact with any questions and/or concerns.








Mental Status Exam





- Vital signs


                                Last Vital Signs











Temp  96.5 F L  08/17/20 05:05


 


Pulse  103 H  08/17/20 05:06


 


Resp  20   08/17/20 06:30


 


BP  114/91   08/17/20 05:05


 


Pulse Ox  96   08/17/20 10:44

## 2020-08-17 NOTE — PROGRESS NOTE
Assessment and Plan





Cultures:


Blood culture and urine culture negative


COVID PCR positive


 


Assessment: 55 years old female with history of paranoid schizophrenia, admitted

on 8/13/2020 due to altered mental status and catatonia state:





#Severe sepsis: Present on admission with low-grade fever, tachycardia, elevated

leukocytosis, WHITNEY, likely due to bilateral pneumonia. 





#Bilateral pneumonia: Should rule out COVID-19 pneumonia versus community-

acquired pneumonia.  Inflammatory markers are elevated, with a d-dimer > 10,000.

 CT negative for pulmonary embolism.





#Acute hypoxemic respiratory failure: mild. on supplemental oxygen.





#Elevated LFTs: from COVID.





#WHITNEY: from COVID. improved





#Elevated LFTs: Very high likely secondary to rhabdomyolysis





#Elevated CK: Likely secondary to rhabdomyolysis possible due to COVID-19 

infection or catatonic state





#Paranoid schizophrenia with catatonia: Per psych





Recommendations:


-continue steroids, day 3 of 10


-elevated LFTs so cannot add Remdesivir


-recheck procalcitonin, continue ceftriaxone and azithromycin for now


-Continue anticoagulation per protocol








Blank Coppola MD, FACP


[facility] ([facility])


C: [phone]


O: [phone]


F: [phone]





Subjective


Date of service: 08/17/20


Interval history: 





No fever.  Oxygen requirements at 2 L nasal cannula.





Objective





- Exam


Narrative Exam: 





Physical Exam (reviewed in chart due to PPE conservation)


Constitutional: limited due to PPE conservation strategy


Head, Ears, Nose: limited due to PPE conservation strategy


Eyes: limited due to PPE conservation strategy


Neck: limited due to PPE conservation strategy


Oral: limited due to PPE conservation strategy


Cardiovascular: limited due to PPE conservation strategy


Respiratory: limited due to PPE conservation strategy


GI: limited due to PPE conservation strategy


Musculoskeletal: limited due to PPE conservation strategy


Skin: limited due to PPE conservation strategy


Hem/Lymphatic: limited due to PPE conservation strategy


Psych: limited due to PPE conservation strategy


Neurological: limited due to PPE conservation strategy





- Constitutional


Vitals: 


                                   Vital Signs











Temp Pulse Resp BP Pulse Ox


 


 96.5 F L  103 H  20   114/91   96 


 


 08/17/20 05:05  08/17/20 05:06  08/17/20 06:30  08/17/20 05:05  08/17/20 10:44








                           Temperature -Last 24 Hours











Temperature                    96.5 F


 


Temperature                    98.0 F


 


Temperature                    98.4 F

















- Labs


CBC & Chem 7: 


                                 08/17/20 08:46





                                 08/17/20 08:46


Labs: 


                              Abnormal lab results











  08/17/20 08/17/20 08/17/20 Range/Units





  08:46 08:46 08:46 


 


WBC   16.2 H   (4.5-11.0)  K/mm3


 


RDW   16.1 H   (13.2-15.2)  %


 


Sodium    147 H  (137-145)  mmol/L


 


Chloride    110.4 H  ()  mmol/L


 


BUN    21 H  (7-17)  mg/dL


 


Glucose    170 H  ()  mg/dL


 


Calcium    7.7 L  (8.4-10.2)  mg/dL


 


Total Creatine Kinase     ()  units/L


 


Valproic Acid  31.8 L    ()  ug/mL














  08/17/20 Range/Units





  08:46 


 


WBC   (4.5-11.0)  K/mm3


 


RDW   (13.2-15.2)  %


 


Sodium   (137-145)  mmol/L


 


Chloride   ()  mmol/L


 


BUN   (7-17)  mg/dL


 


Glucose   ()  mg/dL


 


Calcium   (8.4-10.2)  mg/dL


 


Total Creatine Kinase  2599 H  ()  units/L


 


Valproic Acid   ()  ug/mL

## 2020-08-18 LAB
ALBUMIN SERPL-MCNC: 2.5 G/DL (ref 3.9–5)
ALT SERPL-CCNC: 96 UNITS/L (ref 7–56)
BUN SERPL-MCNC: 20 MG/DL (ref 7–17)
BUN/CREAT SERPL: 22 %
CALCIUM SERPL-MCNC: 8 MG/DL (ref 8.4–10.2)
CRP SERPL-MCNC: 4.2 MG/DL (ref 0–1.3)
HCT VFR BLD CALC: 39.3 % (ref 30.3–42.9)
HEMOLYSIS INDEX: 22
HGB BLD-MCNC: 12.6 GM/DL (ref 10.1–14.3)
MCHC RBC AUTO-ENTMCNC: 32 % (ref 30–34)
MCV RBC AUTO: 89 FL (ref 79–97)
PLATELET # BLD: 197 K/MM3 (ref 140–440)
RBC # BLD AUTO: 4.43 M/MM3 (ref 3.65–5.03)

## 2020-08-18 RX ADMIN — AZITHROMYCIN SCH MLS/HR: 500 INJECTION, POWDER, LYOPHILIZED, FOR SOLUTION INTRAVENOUS at 12:53

## 2020-08-18 RX ADMIN — METHYLPREDNISOLONE SODIUM SUCCINATE SCH MG: 40 INJECTION, POWDER, FOR SOLUTION INTRAMUSCULAR; INTRAVENOUS at 22:32

## 2020-08-18 RX ADMIN — Medication SCH ML: at 11:18

## 2020-08-18 RX ADMIN — HALOPERIDOL SCH MG: 5 TABLET ORAL at 11:18

## 2020-08-18 RX ADMIN — METHYLPREDNISOLONE SODIUM SUCCINATE SCH MG: 40 INJECTION, POWDER, FOR SOLUTION INTRAMUSCULAR; INTRAVENOUS at 05:18

## 2020-08-18 RX ADMIN — HALOPERIDOL SCH MG: 5 TABLET ORAL at 22:32

## 2020-08-18 RX ADMIN — ENOXAPARIN SODIUM SCH MG: 100 INJECTION SUBCUTANEOUS at 22:32

## 2020-08-18 RX ADMIN — CEFTRIAXONE SODIUM SCH MLS/HR: 2 INJECTION, POWDER, FOR SOLUTION INTRAMUSCULAR; INTRAVENOUS at 11:17

## 2020-08-18 RX ADMIN — METHYLPREDNISOLONE SODIUM SUCCINATE SCH MG: 40 INJECTION, POWDER, FOR SOLUTION INTRAMUSCULAR; INTRAVENOUS at 16:01

## 2020-08-18 RX ADMIN — ENOXAPARIN SODIUM SCH MG: 100 INJECTION SUBCUTANEOUS at 11:17

## 2020-08-18 RX ADMIN — VALPROIC ACID SCH MG: 250 SOLUTION ORAL at 22:32

## 2020-08-18 RX ADMIN — Medication SCH ML: at 22:33

## 2020-08-18 NOTE — PROGRESS NOTE
<PADMA AMES LAURENMarco A - Last Filed: 08/18/20 14:16>





Assessment and Plan





- Patient Problems


(1) Severe sepsis


Current Visit: Yes   Status: Acute   


Plan to address problem: 


- Presented with low-grade fever, tachycardia, leukocytosis, WHITNEY, and bilateral 

pneumonia


- Likely source is bilateral pneumonia secondary to COVID-19


- IV antibiotics


- Covid protocol initiated











(2) Pneumonia due to COVID-19 virus


Current Visit: Yes   Status: Acute   


Plan to address problem: 


- COVID protocol initiated


- IV azithromycin and Rocephin initiated


- 8/14 COVID PCR positive


- Trend LDH, CRP, procalcitonin, d-dimer, ferritin 


- Droplet /contact precautions


- Supplemental oxygenation as needed


- Infectious disease consult requested


- Pulmonary hygiene


- In setting of elevated D-Dimer, CTA Chest was ordered which was negative for 

pulmonary embolism


- Anticoagulation per COVID protocol


- Cannot initiate Remdesivir in setting on f elevated LFTs








(3) Neuroleptic malignant syndrome


Current Visit: Yes   Status: Ruled-out   


Plan to address problem: 


- Given catatonic state, elevated CK, AMS and antipsychotic medication use we 

will r/o malignant neuroleptic syndrome


- Neurology has been consulted; does not suspect NMS at this time


- 8/16 CTH shows mild microvascular angiopathy and cerebral atrophy without 

evidence of acute intracranial hemorrhage. 








(4) Rhabdomyolysis


Current Visit: Yes   Status: Acute   


Plan to address problem: 


[place] [address], down to 2599


- Received 2 liters IVF in ED and NS then 1/2 NS  mL/hr


- Trend CK levels








(5) Acute psychosis


Current Visit: Yes   Status: Acute   


Plan to address problem: 


- Currently withdraws to pain however moving all extremities spontaneously


- History of paranoid schizophrenia


- Mental health consult requested and does not recommend inpatient psych at this

time as medical needs outweigh psych this time


- Requested to reconsult when patient is medically stable








(6) Paranoid schizophrenia


Current Visit: Yes   Status: Acute   


Plan to address problem: 


-History of present schizophrenia


-Psych consulted and appreciate recommendations


-Depakote and Haldol 


-Sleep hygiene


-Reorientation as needed








(7) Acute renal failure


Current Visit: Yes   Status: Acute   


Plan to address problem: 


- Vasomotor nephropathy maybe secondary to dehydration and rhabdomyolysis 


- Received 2 L IVF in ED


- Admit Cr/BUN 1.4/33 but now trended down


- Avoid nephrotoxic medications


- Trend BMP








(8) Leukocytosis


Current Visit: Yes   Status: Acute   


Plan to address problem: 


- Admit WBC 13.8


- Trend CBC


- Maybe secondary to possible PNA or from dehydration


- IV abx


- Also on steroids for COVID pneumonia








(9) Thrombocytopenia


Current Visit: Yes   Status: Resolved   


Plan to address problem: 


- Admit Plt is 97 now up to 140K


- Bleeding precautions


- Trend CBC








(10) DVT prophylaxis


Current Visit: Yes   Status: Acute   


Plan to address problem: 


- SCDs to BLE while in bed


- Lovenox subq twice daily per COVID protocol








History


Interval history: 





This is a 55-year-old female with paranoid schizophrenia that presents to the 

emergency department on 8/13 for altered mental status in a catatonic state.  

Previous hospitalizations reviewed and only notes a history of paranoid 

schizophrenia. Patient may be a resident of [place].  Patient is currently 

nonverbal and HPI is received from ER documentation.  Work-up in the emergency 

department included a CT abdomen pelvis which shows patchy peripheral groundglas

s opacities bilateral lungs which is consistent with either viral or atypical 

pneumonia, positive anterior lateral fat hernia in the left mid abdomen and a 

large uterine fibroids however her CXR showed no acute pulmonary or pleural 

abnormalities.  She was found to have acute kidney injury with a creatinine of 

1.4/BUN 33 as baseline from previous records seems to be 0.7, rhabdomyolysis 

with a creatinine kinase of 33,481, leukocytosis with a WBC of 13.8, 

thrombocytopenia with platelets at 97.  She has COVID pneumonia with IV 

antibiotics, rhabdomyolysis, and WHITNEY. Infectious disease, psych, and neurology 

have been consulted. Neurology does not suspect NMS at this time. Psych has 

signed off at this time due to ongoing acute medical processes.  PT/ST consulted

for evaluation. At the time of my exam is patient verbalized to painful stimuli 

however she does not open her eyes to verbal or physical stimuli. 





* Discussed with primary caretaker patient was normally verbal but sometimes 

  goes into a catatonic state.  Phone number for this is [phone]


* 8/17: continue treatment


* 8/16: Change fluids to NS 1/2 AT 125CC/hr. Rhabdomylysis improving, insert NGT

  and start tube feeds. Psych input noted. Continue management for COVID 19. ID 

  consulted. No new fever.  CTA and CTH obtained which were both negative


* 8/15: COVID PCR Positive








Hospitalist Physical





- Constitutional


Vitals: 


                                        











Temp Pulse Resp BP Pulse Ox


 


 97.5 F L  90   21   136/83   96 


 


 08/18/20 05:52  08/18/20 05:55  08/18/20 05:52  08/18/20 05:52  08/18/20 10:00











General appearance: Present: no acute distress, other (Patient slightly opens 

her eyes to tactile stimuli and withdraws to painful stimuli in all 4 

extremities.  She is seen MCFADDEN spontaneously.)





- EENT


Eyes: Present: PERRL





- Neck


Neck: Present: normal ROM





- Respiratory


Respiratory effort: normal


Respiratory: bilateral: CTA





- Cardiovascular


Rhythm: regular


Heart Sounds: Present: S1 & S2.  Absent: systolic murmur, diastolic murmur





- Extremities


Extremities: no ischemia, pulses intact, pulses symmetrical, No edema, normal t

emperature, normal color


Peripheral Pulses: within normal limits





- Abdominal


General gastrointestinal: soft, non-tender, non-distended, normal bowel sounds





- Integumentary


Integumentary: Present: clear, warm, dry





- Psychiatric


Psychiatric: other (Moans and withdraws to verbal and physical stimuli)





- Neurologic


Neurologic: moves all extremities (Painful stimuli)





- Allied Health


Allied health notes reviewed: nursing, social work, case management





Results





- Labs


CBC & Chem 7: 


                                 08/18/20 08:32





                                 08/18/20 08:32


Labs: 


                             Laboratory Last Values











WBC  18.4 K/mm3 (4.5-11.0)  H  08/18/20  08:32    


 


RBC  4.43 M/mm3 (3.65-5.03)   08/18/20  08:32    


 


Hgb  12.6 gm/dl (10.1-14.3)   08/18/20  08:32    


 


Hct  39.3 % (30.3-42.9)   08/18/20  08:32    


 


MCV  89 fl (79-97)   08/18/20  08:32    


 


MCH  29 pg (28-32)   08/18/20  08:32    


 


MCHC  32 % (30-34)   08/18/20  08:32    


 


RDW  16.0 % (13.2-15.2)  H  08/18/20  08:32    


 


Plt Count  197 K/mm3 (140-440)   08/18/20  08:32    


 


Lymph % (Auto)  10.6 % (13.4-35.0)  L  08/13/20  22:44    


 


Mono % (Auto)  9.8 % (0.0-7.3)  H  08/13/20  22:44    


 


Eos % (Auto)  0.0 % (0.0-4.3)   08/13/20  22:44    


 


Baso % (Auto)  1.2 % (0.0-1.8)   08/13/20  22:44    


 


Lymph #  1.5 K/mm3 (1.2-5.4)   08/13/20  22:44    


 


Mono #  1.3 K/mm3 (0.0-0.8)  H  08/13/20  22:44    


 


Eos #  0.0 K/mm3 (0.0-0.4)   08/13/20  22:44    


 


Baso #  0.2 K/mm3 (0.0-0.1)  H  08/13/20  22:44    


 


Add Manual Diff  Complete   08/15/20  05:08    


 


Total Counted  100   08/15/20  05:08    


 


Seg Neutrophils %  78.4 % (40.0-70.0)  H  08/13/20  22:44    


 


Seg Neuts % (Manual)  72.0 % (40.0-70.0)  H  08/15/20  05:08    


 


Band Neutrophils %  8.0 %  08/15/20  05:08    


 


Lymphocytes % (Manual)  13.0 % (13.4-35.0)  L  08/15/20  05:08    


 


Reactive Lymphs % (Man)  0 %  08/15/20  05:08    


 


Monocytes % (Manual)  7.0 % (0.0-7.3)   08/15/20  05:08    


 


Eosinophils % (Manual)  0 % (0.0-4.3)   08/15/20  05:08    


 


Basophils % (Manual)  0 % (0.0-1.8)   08/15/20  05:08    


 


Metamyelocytes %  0 %  08/15/20  05:08    


 


Myelocytes %  0 %  08/15/20  05:08    


 


Promyelocytes %  0 %  08/15/20  05:08    


 


Blast Cells %  0 %  08/15/20  05:08    


 


Nucleated RBC %  Not Reportable   08/15/20  05:08    


 


Seg Neutrophils #  10.8 K/mm3 (1.8-7.7)  H  08/13/20  22:44    


 


Seg Neutrophils # Man  7.5 K/mm3 (1.8-7.7)   08/15/20  05:08    


 


Band Neutrophils #  0.8 K/mm3  08/15/20  05:08    


 


Lymphocytes # (Manual)  1.4 K/mm3 (1.2-5.4)   08/15/20  05:08    


 


Abs React Lymphs (Man)  0.0 K/mm3  08/15/20  05:08    


 


Monocytes # (Manual)  0.7 K/mm3 (0.0-0.8)   08/15/20  05:08    


 


Eosinophils # (Manual)  0.0 K/mm3 (0.0-0.4)   08/15/20  05:08    


 


Basophils # (Manual)  0.0 K/mm3 (0.0-0.1)   08/15/20  05:08    


 


Metamyelocytes #  0.0 K/mm3  08/15/20  05:08    


 


Myelocytes #  0.0 K/mm3  08/15/20  05:08    


 


Promyelocytes #  0.0 K/mm3  08/15/20  05:08    


 


Blast Cells #  0.0 K/mm3  08/15/20  05:08    


 


WBC Morphology  Not Reportable   08/15/20  05:08    


 


Hypersegmented Neuts  Not Reportable   08/15/20  05:08    


 


Hyposegmented Neuts  Not Reportable   08/15/20  05:08    


 


Hypogranular Neuts  Not Reportable   08/15/20  05:08    


 


Smudge Cells  Not Reportable   08/15/20  05:08    


 


Toxic Granulation  Not Reportable   08/15/20  05:08    


 


Toxic Vacuolation  Not Reportable   08/15/20  05:08    


 


Dohle Bodies  Not Reportable   08/15/20  05:08    


 


Pelger-Huet Anomaly  Not Reportable   08/15/20  05:08    


 


Arnie Rods  Not Reportable   08/15/20  05:08    


 


Platelet Estimate  Consistent w auto   08/15/20  05:08    


 


Clumped Platelets  Not Reportable   08/15/20  05:08    


 


Plt Clumps, EDTA  Not Reportable   08/15/20  05:08    


 


Large Platelets  Not Reportable   08/15/20  05:08    


 


Giant Platelets  Not Reportable   08/15/20  05:08    


 


Platelet Satelliting  Not Reportable   08/15/20  05:08    


 


Plt Morphology Comment  Not Reportable   08/15/20  05:08    


 


RBC Morphology  Not Reportable   08/15/20  05:08    


 


Dimorphic RBCs  Not Reportable   08/15/20  05:08    


 


Polychromasia  Not Reportable   08/15/20  05:08    


 


Hypochromasia  Not Reportable   08/15/20  05:08    


 


Poikilocytosis  Not Reportable   08/15/20  05:08    


 


Anisocytosis  1+   08/15/20  05:08    


 


Microcytosis  Not Reportable   08/15/20  05:08    


 


Macrocytosis  Not Reportable   08/15/20  05:08    


 


Spherocytes  Not Reportable   08/15/20  05:08    


 


Pappenheimer Bodies  Not Reportable   08/15/20  05:08    


 


Sickle Cells  Not Reportable   08/15/20  05:08    


 


Target Cells  Not Reportable   08/15/20  05:08    


 


Tear Drop Cells  Not Reportable   08/15/20  05:08    


 


Ovalocytes  Not Reportable   08/15/20  05:08    


 


Helmet Cells  Not Reportable   08/15/20  05:08    


 


Mancera-Orason Bodies  Not Reportable   08/15/20  05:08    


 


Cabot Rings  Not Reportable   08/15/20  05:08    


 


Jd Cells  Not Reportable   08/15/20  05:08    


 


Bite Cells  Not Reportable   08/15/20  05:08    


 


Crenated Cell  Not Reportable   08/15/20  05:08    


 


Elliptocytes  Not Reportable   08/15/20  05:08    


 


Acanthocytes (Spur)  Not Reportable   08/15/20  05:08    


 


Rouleaux  Not Reportable   08/15/20  05:08    


 


Hemoglobin C Crystals  Not Reportable   08/15/20  05:08    


 


Schistocytes  Not Reportable   08/15/20  05:08    


 


Malaria parasites  Not Reportable   08/15/20  05:08    


 


Dakota Bodies  Not Reportable   08/15/20  05:08    


 


Hem Pathologist Commnt  No   08/15/20  05:08    


 


D-Dimer  654.97 ng/mlDDU (0-234)  H  08/18/20  08:32    


 


Sodium  145 mmol/L (137-145)   08/18/20  08:32    


 


Potassium  4.1 mmol/L (3.6-5.0)   08/18/20  08:32    


 


Chloride  110.4 mmol/L ()  H  08/18/20  08:32    


 


Carbon Dioxide  22 mmol/L (22-30)   08/18/20  08:32    


 


Anion Gap  17 mmol/L  08/18/20  08:32    


 


BUN  20 mg/dL (7-17)  H  08/18/20  08:32    


 


Creatinine  0.9 mg/dL (0.6-1.2)   08/18/20  08:32    


 


Estimated GFR  > 60 ml/min  08/18/20  08:32    


 


BUN/Creatinine Ratio  22 %  08/18/20  08:32    


 


Glucose  222 mg/dL ()  H  08/18/20  08:32    


 


Lactic Acid  1.30 mmol/L (0.7-2.0)   08/13/20  22:44    


 


Calcium  8.0 mg/dL (8.4-10.2)  L  08/18/20  08:32    


 


Ferritin  1325.0 ng/mL (10.0-200.0)  H  08/18/20  08:32    


 


Total Bilirubin  0.40 mg/dL (0.1-1.2)   08/18/20  08:32    


 


Direct Bilirubin  0.3 mg/dL (0-0.2)  H  08/13/20  22:44    


 


Indirect Bilirubin  0.2 mg/dL  08/13/20  22:44    


 


AST  149 units/L (5-40)  H  08/18/20  08:32    


 


ALT  96 units/L (7-56)  H  08/18/20  08:32    


 


Alkaline Phosphatase  40 units/L ()   08/18/20  08:32    


 


Lactate Dehydrogenase  817 units/L ()  H  08/18/20  08:32    


 


Total Creatine Kinase  934 units/L ()  H  08/18/20  08:32    


 


C-Reactive Protein  4.20 mg/dL (0.00-1.30)  H  08/18/20  08:32    


 


Total Protein  6.1 g/dL (6.3-8.2)  L  08/18/20  08:32    


 


Albumin  2.5 g/dL (3.9-5)  L  08/18/20  08:32    


 


Albumin/Globulin Ratio  0.7 %  08/18/20  08:32    


 


Procalcitonin  0.71 ng/mL (<0.15)   08/18/20  08:32    


 


Urine Color  Tiffany  (Yellow)   08/14/20  Unknown


 


Urine Turbidity  Slightly-cloudy  (Clear)   08/14/20  Unknown


 


Urine pH  5.0  (5.0-7.0)   08/14/20  Unknown


 


Ur Specific Gravity  1.026  (1.003-1.030)   08/14/20  Unknown


 


Urine Protein  100 mg/dl mg/dL (Negative)   08/14/20  Unknown


 


Urine Glucose (UA)  Neg mg/dL (Negative)   08/14/20  Unknown


 


Urine Ketones  Tr mg/dL (Negative)   08/14/20  Unknown


 


Urine Blood  Lg  (Negative)   08/14/20  Unknown


 


Urine Nitrite  Neg  (Negative)   08/14/20  Unknown


 


Urine Bilirubin  Neg  (Negative)   08/14/20  Unknown


 


Urine Urobilinogen  2.0 mg/dL (<2.0)   08/14/20  Unknown


 


Ur Leukocyte Esterase  Neg  (Negative)   08/14/20  Unknown


 


Urine WBC (Auto)  17.0 /HPF (0.0-6.0)  H  08/14/20  Unknown


 


Urine RBC (Auto)  1.0 /HPF (0.0-6.0)   08/14/20  Unknown


 


U Epithel Cells (Auto)  4.0 /HPF (0-13.0)   08/14/20  Unknown


 


Urine Mucus  2+ /HPF  08/14/20  Unknown


 


Salicylates  < 0.3 mg/dL (2.8-20.0)  L  08/13/20  22:44    


 


Urine Opiates Screen  Presumptive negative   08/14/20  Unknown


 


Urine Methadone Screen  Presumptive negative   08/14/20  Unknown


 


Acetaminophen  5.0 ug/mL (10.0-30.0)  L  08/13/20  22:44    


 


Ur Barbiturates Screen  Presumptive negative   08/14/20  Unknown


 


Valproic Acid  31.8 ug/mL ()  L  08/17/20  08:46    


 


Ur Phencyclidine Scrn  Presumptive negative   08/14/20  Unknown


 


Ur Amphetamines Screen  Presumptive negative   08/14/20  Unknown


 


U Benzodiazepines Scrn  Presumptive negative   08/14/20  Unknown


 


Urine Cocaine Screen  Presumptive negative   08/14/20  Unknown


 


U Marijuana (THC) Screen  Presumptive negative   08/14/20  Unknown


 


Drugs of Abuse Note  Disclamer   08/14/20  Unknown


 


Plasma/Serum Alcohol  < 0.01 % (0-0.07)   08/13/20  22:44    


 


Coronavirus (PCR)  Positive  (Negative)  A  08/15/20  Unknown











Microbiology: 


Microbiology





08/14/20 01:40   Peripheral/Venous   Blood Culture - Preliminary


                            NO GROWTH AFTER 4 DAYS


08/14/20 01:59   Peripheral/Venous   Blood Culture - Preliminary


                            NO GROWTH AFTER 4 DAYS








Schmidt/IV: 


                                        





Voiding Method                   External Female Catheter


IV Catheter Type [Right Upper    INT / Saline Lock


arm]                             


IV Catheter Type [Right          INT / Saline Lock


Antecubital]                     


IV Catheter Type [Right Foot]    INT / Saline Lock


IV Catheter Type [Right          Peripheral IV


External Jugular]                











Active Medications





- Current Medications


Current Medications: 














Generic Name Dose Route Start Last Admin





  Trade Name Freq  PRN Reason Stop Dose Admin


 


Acetaminophen  650 mg  08/14/20 08:00  08/17/20 05:30





  Tylenol  PO   650 mg





  Q4H PRN   Administration





  Pain MILD(1-3)/Fever >100.5/HA  


 


Lipase/Protease/Amylase  1 each  08/17/20 10:34 





  Pancreazkim Clay 10,500 Unit  FEEDTUBE  





  PRN PRN  





  For Clogged Feeding Tube  


 


Enoxaparin Sodium  40 mg  08/17/20 22:00  08/18/20 11:17





  Enoxaparin  SUB-Q   40 mg





  BID KATHYA   Administration


 


Haloperidol  5 mg  08/15/20 12:00  08/18/20 11:18





  Haldol  PO   5 mg





  BID KATHYA   Administration


 


Ceftriaxone Sodium  2 gm in 100 mls @ 200 mls/hr  08/14/20 10:00  08/18/20 11:17





  Rocephin/Ns 2 Gm/100 Ml  IV   200 mls/hr





  Q24HR KATHYA   Administration





  Protocol  


 


Methylprednisolone Sodium Succinate  40 mg  08/15/20 22:00  08/18/20 05:18





  Solu-Medrol  IV   40 mg





  Q8HR KATHYA   Administration


 


Miscellaneous Medication  325 mg  08/15/20 18:45 





  Paliperidone Palmitate [Invega Sustenna]  IM  





  Q3W KATHYA  


 


Ondansetron HCl  4 mg  08/14/20 08:00 





  Zofran  IV  





  Q8H PRN  





  Nausea And Vomiting  


 


Simple Syrup  15 ml  08/17/20 10:34 





  Simple Syrup  FEEDTUBE  





  PRN PRN  





  Hypoglycemia  


 


Simple Syrup  30 ml  08/17/20 10:34 





  Simple Syrup  FEEDTUBE  





  PRN PRN  





  Hypoglycemia  


 


Sodium Bicarbonate  325 mg  08/17/20 10:34 





  Sodium Bicarbonate  FEEDTUBE  





  PRN PRN  





  For Clogged Feeding Tube  


 


Sodium Chloride  10 ml  08/14/20 10:00  08/18/20 11:18





  Sodium Chloride Flush Syringe 10 Ml  IV   10 ml





  BID KATHYA   Administration


 


Sodium Chloride  10 ml  08/14/20 07:50 





  Sodium Chloride Flush Syringe 10 Ml  IV  08/27/20 07:49 





  PRN PRN  





  LINE FLUSH  


 


Trazodone HCl  50 mg  08/14/20 22:00  08/17/20 21:26





  Desyrel  PO   50 mg





  [facility] KATHYA   Administration


 


Valproic Acid  1,500 mg  08/18/20 22:00 





  Depakene Liq  PO  





  [facility]  














Nutrition/Malnutrition Assess





- Dietary Evaluation


Nutrition/Malnutrition Findings: 


                                        





Nutrition Notes                                            Start:  08/17/20 

09:33


Freq:                                                      Status: Active       




Protocol:                                                                       




 Document     08/17/20 09:34  LP  (Rec: 08/17/20 10:31  LP  [id number])


 Nutrition Notes


     Need for Assessment generated from:         MD Order


     Initial or Follow up                        Assessment


     Current Diagnosis                           Acute Kidney Injury,Sepsis


     Other Pertinent Diagnosis                   COVID-19 (+), Schizoaffective


                                                 disorder


     Current Diet                                No diet


     Labs/Tests                                  Na 150


                                                 


     Pertinent Medications                       Solumedrol


     Height                                      5 ft 6 in


     Weight                                      114 kg


     Ideal Body Weight (kg)                      59.09


     BMI                                         40.5


     Weight Status                               Morbidly Obese


     Subjective/Other Information                Consult for TF. Pt has NGT. Pt


                                                 With AMS and non-verbal.


     Burn                                        Absent


     Trauma                                      Absent


     GI Symptoms                                 None


     Difficulty In                               Swallowing


     Food Allergy                                No


     Current % PO                                Negligible


     Minimum of two criteria                     No


     Reduced  Strength                       Measurably Reduced (severe)


     #1


      Nutrition Diagnosis                        Inadequate oral intake


      Etiology                                   Swallowing difficulty and AMS


      As Evidenced by Signs and Symptoms         Pt noted with decreased


                                                 responsiveness and NGT placed


     Is patient on ventilator?                   No


     Is Patient Ambulatory and/or Out of Bed     No


     REE-([facility]-confined to bed)      2106.012


     Kcal/Kg value to use for calculation        14


     Approximate Energy Requirements Using       1596


      kcal/Kg                                    


     Calculation Used for Recommendations        Kcal/kg


     Additional Notes                            Protein needs are 69-87g (0.8-


                                                 1g/kg adjusted wt 86.5kg)


                                                 Fluid needs are 1ml/kcal


 Nutrition Intervention


     Change Diet Order:                          TF


     Nutrition Support:                          Jevity 1.2 at 55ml/hr


                                                 Flush with 150ml q4h


     Kcal                                        1,584


     Protein (gm)                                73


     Fluid (mL)                                  1,065


     Goal #1                                     Meet at least 80% of kcal and


                                                 protein needs via TF


     Anticipated Discharge Needs:                Unable to determine at this


                                                 time


     Follow-Up By:                               08/19/20


     Additional Comments                         Follow for TF start/tolerance











<JING CERRATO R - Last Filed: 08/30/20 21:37>





Assessment and Plan


Assessment and plan: 





I saw and evaluated the patient. I agree with the findings and the plan of care 

as documented in the Nurse Practitioner's~note, with the following corrections 

and additions.





Patient seen remains nonverbal feeding


We will do speech eval and PT eval


Continue current management and plan











Hospitalist Physical





- Constitutional


Vitals: 


                                        











Temp Pulse Resp BP Pulse Ox


 


 97.5 F L  90   21   136/83   96 


 


 08/18/20 05:52  08/18/20 05:55  08/18/20 05:52  08/18/20 05:52  08/18/20 10:00














Results





- Labs


CBC & Chem 7: 


                                 08/29/20 05:31





                                 08/27/20 04:28


Labs: 


                             Laboratory Last Values











WBC  18.4 K/mm3 (4.5-11.0)  H  08/18/20  08:32    


 


RBC  4.43 M/mm3 (3.65-5.03)   08/18/20  08:32    


 


Hgb  12.6 gm/dl (10.1-14.3)   08/18/20  08:32    


 


Hct  39.3 % (30.3-42.9)   08/18/20  08:32    


 


MCV  89 fl (79-97)   08/18/20  08:32    


 


MCH  29 pg (28-32)   08/18/20  08:32    


 


MCHC  32 % (30-34)   08/18/20  08:32    


 


RDW  16.0 % (13.2-15.2)  H  08/18/20  08:32    


 


Plt Count  197 K/mm3 (140-440)   08/18/20  08:32    


 


Lymph % (Auto)  10.6 % (13.4-35.0)  L  08/13/20  22:44    


 


Mono % (Auto)  9.8 % (0.0-7.3)  H  08/13/20  22:44    


 


Eos % (Auto)  0.0 % (0.0-4.3)   08/13/20  22:44    


 


Baso % (Auto)  1.2 % (0.0-1.8)   08/13/20  22:44    


 


Lymph #  1.5 K/mm3 (1.2-5.4)   08/13/20  22:44    


 


Mono #  1.3 K/mm3 (0.0-0.8)  H  08/13/20  22:44    


 


Eos #  0.0 K/mm3 (0.0-0.4)   08/13/20  22:44    


 


Baso #  0.2 K/mm3 (0.0-0.1)  H  08/13/20  22:44    


 


Add Manual Diff  Complete   08/15/20  05:08    


 


Total Counted  100   08/15/20  05:08    


 


Seg Neutrophils %  78.4 % (40.0-70.0)  H  08/13/20  22:44    


 


Seg Neuts % (Manual)  72.0 % (40.0-70.0)  H  08/15/20  05:08    


 


Band Neutrophils %  8.0 %  08/15/20  05:08    


 


Lymphocytes % (Manual)  13.0 % (13.4-35.0)  L  08/15/20  05:08    


 


Reactive Lymphs % (Man)  0 %  08/15/20  05:08    


 


Monocytes % (Manual)  7.0 % (0.0-7.3)   08/15/20  05:08    


 


Eosinophils % (Manual)  0 % (0.0-4.3)   08/15/20  05:08    


 


Basophils % (Manual)  0 % (0.0-1.8)   08/15/20  05:08    


 


Metamyelocytes %  0 %  08/15/20  05:08    


 


Myelocytes %  0 %  08/15/20  05:08    


 


Promyelocytes %  0 %  08/15/20  05:08    


 


Blast Cells %  0 %  08/15/20  05:08    


 


Nucleated RBC %  Not Reportable   08/15/20  05:08    


 


Seg Neutrophils #  10.8 K/mm3 (1.8-7.7)  H  08/13/20  22:44    


 


Seg Neutrophils # Man  7.5 K/mm3 (1.8-7.7)   08/15/20  05:08    


 


Band Neutrophils #  0.8 K/mm3  08/15/20  05:08    


 


Lymphocytes # (Manual)  1.4 K/mm3 (1.2-5.4)   08/15/20  05:08    


 


Abs React Lymphs (Man)  0.0 K/mm3  08/15/20  05:08    


 


Monocytes # (Manual)  0.7 K/mm3 (0.0-0.8)   08/15/20  05:08    


 


Eosinophils # (Manual)  0.0 K/mm3 (0.0-0.4)   08/15/20  05:08    


 


Basophils # (Manual)  0.0 K/mm3 (0.0-0.1)   08/15/20  05:08    


 


Metamyelocytes #  0.0 K/mm3  08/15/20  05:08    


 


Myelocytes #  0.0 K/mm3  08/15/20  05:08    


 


Promyelocytes #  0.0 K/mm3  08/15/20  05:08    


 


Blast Cells #  0.0 K/mm3  08/15/20  05:08    


 


WBC Morphology  Not Reportable   08/15/20  05:08    


 


Hypersegmented Neuts  Not Reportable   08/15/20  05:08    


 


Hyposegmented Neuts  Not Reportable   08/15/20  05:08    


 


Hypogranular Neuts  Not Reportable   08/15/20  05:08    


 


Smudge Cells  Not Reportable   08/15/20  05:08    


 


Toxic Granulation  Not Reportable   08/15/20  05:08    


 


Toxic Vacuolation  Not Reportable   08/15/20  05:08    


 


Dohle Bodies  Not Reportable   08/15/20  05:08    


 


Pelger-Huet Anomaly  Not Reportable   08/15/20  05:08    


 


Arnie Rods  Not Reportable   08/15/20  05:08    


 


Platelet Estimate  Consistent w auto   08/15/20  05:08    


 


Clumped Platelets  Not Reportable   08/15/20  05:08    


 


Plt Clumps, EDTA  Not Reportable   08/15/20  05:08    


 


Large Platelets  Not Reportable   08/15/20  05:08    


 


Giant Platelets  Not Reportable   08/15/20  05:08    


 


Platelet Satelliting  Not Reportable   08/15/20  05:08    


 


Plt Morphology Comment  Not Reportable   08/15/20  05:08    


 


RBC Morphology  Not Reportable   08/15/20  05:08    


 


Dimorphic RBCs  Not Reportable   08/15/20  05:08    


 


Polychromasia  Not Reportable   08/15/20  05:08    


 


Hypochromasia  Not Reportable   08/15/20  05:08    


 


Poikilocytosis  Not Reportable   08/15/20  05:08    


 


Anisocytosis  1+   08/15/20  05:08    


 


Microcytosis  Not Reportable   08/15/20  05:08    


 


Macrocytosis  Not Reportable   08/15/20  05:08    


 


Spherocytes  Not Reportable   08/15/20  05:08    


 


Pappenheimer Bodies  Not Reportable   08/15/20  05:08    


 


Sickle Cells  Not Reportable   08/15/20  05:08    


 


Target Cells  Not Reportable   08/15/20  05:08    


 


Tear Drop Cells  Not Reportable   08/15/20  05:08    


 


Ovalocytes  Not Reportable   08/15/20  05:08    


 


Helmet Cells  Not Reportable   08/15/20  05:08    


 


Mancera-Orason Bodies  Not Reportable   08/15/20  05:08    


 


Cabot Rings  Not Reportable   08/15/20  05:08    


 


Jd Cells  Not Reportable   08/15/20  05:08    


 


Bite Cells  Not Reportable   08/15/20  05:08    


 


Crenated Cell  Not Reportable   08/15/20  05:08    


 


Elliptocytes  Not Reportable   08/15/20  05:08    


 


Acanthocytes (Spur)  Not Reportable   08/15/20  05:08    


 


Rouleaux  Not Reportable   08/15/20  05:08    


 


Hemoglobin C Crystals  Not Reportable   08/15/20  05:08    


 


Schistocytes  Not Reportable   08/15/20  05:08    


 


Malaria parasites  Not Reportable   08/15/20  05:08    


 


Dakota Bodies  Not Reportable   08/15/20  05:08    


 


Hem Pathologist Commnt  No   08/15/20  05:08    


 


D-Dimer  654.97 ng/mlDDU (0-234)  H  08/18/20  08:32    


 


Sodium  145 mmol/L (137-145)   08/18/20  08:32    


 


Potassium  4.1 mmol/L (3.6-5.0)   08/18/20  08:32    


 


Chloride  110.4 mmol/L ()  H  08/18/20  08:32    


 


Carbon Dioxide  22 mmol/L (22-30)   08/18/20  08:32    


 


Anion Gap  17 mmol/L  08/18/20  08:32    


 


BUN  20 mg/dL (7-17)  H  08/18/20  08:32    


 


Creatinine  0.9 mg/dL (0.6-1.2)   08/18/20  08:32    


 


Estimated GFR  > 60 ml/min  08/18/20  08:32    


 


BUN/Creatinine Ratio  22 %  08/18/20  08:32    


 


Glucose  222 mg/dL ()  H  08/18/20  08:32    


 


Lactic Acid  1.30 mmol/L (0.7-2.0)   08/13/20  22:44    


 


Calcium  8.0 mg/dL (8.4-10.2)  L  08/18/20  08:32    


 


Ferritin  1325.0 ng/mL (10.0-200.0)  H  08/18/20  08:32    


 


Total Bilirubin  0.40 mg/dL (0.1-1.2)   08/18/20  08:32    


 


Direct Bilirubin  0.3 mg/dL (0-0.2)  H  08/13/20  22:44    


 


Indirect Bilirubin  0.2 mg/dL  08/13/20  22:44    


 


AST  149 units/L (5-40)  H  08/18/20  08:32    


 


ALT  96 units/L (7-56)  H  08/18/20  08:32    


 


Alkaline Phosphatase  40 units/L ()   08/18/20  08:32    


 


Lactate Dehydrogenase  817 units/L ()  H  08/18/20  08:32    


 


Total Creatine Kinase  934 units/L ()  H  08/18/20  08:32    


 


C-Reactive Protein  4.20 mg/dL (0.00-1.30)  H  08/18/20  08:32    


 


Total Protein  6.1 g/dL (6.3-8.2)  L  08/18/20  08:32    


 


Albumin  2.5 g/dL (3.9-5)  L  08/18/20  08:32    


 


Albumin/Globulin Ratio  0.7 %  08/18/20  08:32    


 


Procalcitonin  0.71 ng/mL (<0.15)   08/18/20  08:32    


 


Urine Color  Tiffany  (Yellow)   08/14/20  Unknown


 


Urine Turbidity  Slightly-cloudy  (Clear)   08/14/20  Unknown


 


Urine pH  5.0  (5.0-7.0)   08/14/20  Unknown


 


Ur Specific Gravity  1.026  (1.003-1.030)   08/14/20  Unknown


 


Urine Protein  100 mg/dl mg/dL (Negative)   08/14/20  Unknown


 


Urine Glucose (UA)  Neg mg/dL (Negative)   08/14/20  Unknown


 


Urine Ketones  Tr mg/dL (Negative)   08/14/20  Unknown


 


Urine Blood  Lg  (Negative)   08/14/20  Unknown


 


Urine Nitrite  Neg  (Negative)   08/14/20  Unknown


 


Urine Bilirubin  Neg  (Negative)   08/14/20  Unknown


 


Urine Urobilinogen  2.0 mg/dL (<2.0)   08/14/20  Unknown


 


Ur Leukocyte Esterase  Neg  (Negative)   08/14/20  Unknown


 


Urine WBC (Auto)  17.0 /HPF (0.0-6.0)  H  08/14/20  Unknown


 


Urine RBC (Auto)  1.0 /HPF (0.0-6.0)   08/14/20  Unknown


 


U Epithel Cells (Auto)  4.0 /HPF (0-13.0)   08/14/20  Unknown


 


Urine Mucus  2+ /HPF  08/14/20  Unknown


 


Salicylates  < 0.3 mg/dL (2.8-20.0)  L  08/13/20  22:44    


 


Urine Opiates Screen  Presumptive negative   08/14/20  Unknown


 


Urine Methadone Screen  Presumptive negative   08/14/20  Unknown


 


Acetaminophen  5.0 ug/mL (10.0-30.0)  L  08/13/20  22:44    


 


Ur Barbiturates Screen  Presumptive negative   08/14/20  Unknown


 


Valproic Acid  31.8 ug/mL ()  L  08/17/20  08:46    


 


Ur Phencyclidine Scrn  Presumptive negative   08/14/20  Unknown


 


Ur Amphetamines Screen  Presumptive negative   08/14/20  Unknown


 


U Benzodiazepines Scrn  Presumptive negative   08/14/20  Unknown


 


Urine Cocaine Screen  Presumptive negative   08/14/20  Unknown


 


U Marijuana (THC) Screen  Presumptive negative   08/14/20  Unknown


 


Drugs of Abuse Note  Disclamer   08/14/20  Unknown


 


Plasma/Serum Alcohol  < 0.01 % (0-0.07)   08/13/20  22:44    


 


Coronavirus (PCR)  Positive  (Negative)  A  08/15/20  Unknown











Microbiology: 


Microbiology





08/14/20 01:40   Peripheral/Venous   Blood Culture - Preliminary


                            NO GROWTH AFTER 4 DAYS


08/14/20 01:59   Peripheral/Venous   Blood Culture - Preliminary


                            NO GROWTH AFTER 4 DAYS








Schmidt/IV: 


                                        





Voiding Method                   External Female Catheter


IV Catheter Type [Right Upper    INT / Saline Lock


arm]                             


IV Catheter Type [Right          INT / Saline Lock


Antecubital]                     


IV Catheter Type [Right Foot]    INT / Saline Lock


IV Catheter Type [Right          Peripheral IV


External Jugular]                











Active Medications





- Current Medications


Current Medications: 














Generic Name Dose Route Start Last Admin





  Trade Name Freq  PRN Reason Stop Dose Admin


 


Acetaminophen  650 mg  08/14/20 08:00  08/17/20 05:30





  Tylenol  PO   650 mg





  Q4H PRN   Administration





  Pain MILD(1-3)/Fever >100.5/HA  


 


Lipase/Protease/Amylase  1 each  08/17/20 10:34 





  Pancreaze  10,500 Unit  FEEDTUBE  





  PRN PRN  





  For Clogged Feeding Tube  


 


Enoxaparin Sodium  40 mg  08/17/20 22:00  08/18/20 11:17





  Enoxaparin  SUB-Q   40 mg





  BID KATHYA   Administration


 


Haloperidol  5 mg  08/15/20 12:00  08/18/20 11:18





  Haldol  PO   5 mg





  BID KATHYA   Administration


 


Ceftriaxone Sodium  2 gm in 100 mls @ 200 mls/hr  08/14/20 10:00  08/18/20 11:17





  Rocephin/Ns 2 Gm/100 Ml  IV   200 mls/hr





  [facility]   Administration





  Protocol  


 


Methylprednisolone Sodium Succinate  40 mg  08/15/20 22:00  08/18/20 05:18





  Solu-Medrol  IV   40 mg





  Q8HR KATHYA   Administration


 


Miscellaneous Medication  325 mg  08/15/20 18:45 





  Paliperidone Palmitate [Invega Sustenna]  IM  





  Q3W KATHYA  


 


Ondansetron HCl  4 mg  08/14/20 08:00 





  Zofran  IV  





  Q8H PRN  





  Nausea And Vomiting  


 


Simple Syrup  15 ml  08/17/20 10:34 





  Simple Syrup  FEEDTUBE  





  PRN PRN  





  Hypoglycemia  


 


Simple Syrup  30 ml  08/17/20 10:34 





  Simple Syrup  FEEDTUBE  





  PRN PRN  





  Hypoglycemia  


 


Sodium Bicarbonate  325 mg  08/17/20 10:34 





  Sodium Bicarbonate  FEEDTUBE  





  PRN PRN  





  For Clogged Feeding Tube  


 


Sodium Chloride  10 ml  08/14/20 10:00  08/18/20 11:18





  Sodium Chloride Flush Syringe 10 Ml  IV   10 ml





  BID KATHYA   Administration


 


Sodium Chloride  10 ml  08/14/20 07:50 





  Sodium Chloride Flush Syringe 10 Ml  IV  08/27/20 07:49 





  PRN PRN  





  LINE FLUSH  


 


Trazodone HCl  50 mg  08/14/20 22:00  08/17/20 21:26





  Desyrel  PO   50 mg





  [facility]   Administration


 


Valproic Acid  1,500 mg  08/18/20 22:00 





  Depakene Liq  PO  





  [facility]  














Nutrition/Malnutrition Assess





- Dietary Evaluation


Nutrition/Malnutrition Findings: 


                                        





Nutrition Notes                                            Start:  08/17/20 

09:33


Freq:                                                      Status: Active       




Protocol:                                                                       




 Document     08/17/20 09:34  LP  (Rec: 08/17/20 10:31  LP  [id number])


 Nutrition Notes


     Need for Assessment generated from:         MD Order


     Initial or Follow up                        Assessment


     Current Diagnosis                           Acute Kidney Injury,Sepsis


     Other Pertinent Diagnosis                   COVID-19 (+), Schizoaffective


                                                 disorder


     Current Diet                                No diet


     Labs/Tests                                  Na 150


                                                 


     Pertinent Medications                       Solumedrol


     Height                                      5 ft 6 in


     Weight                                      114 kg


     Ideal Body Weight (kg)                      59.09


     BMI                                         40.5


     Weight Status                               Morbidly Obese


     Subjective/Other Information                Consult for TF. Pt has NGT. Pt


                                                 With AMS and non-verbal.


     Burn                                        Absent


     Trauma                                      Absent


     GI Symptoms                                 None


     Difficulty In                               Swallowing


     Food Allergy                                No


     Current % PO                                Negligible


     Minimum of two criteria                     No


     Reduced  Strength                       Measurably Reduced (severe)


     #1


      Nutrition Diagnosis                        Inadequate oral intake


      Etiology                                   Swallowing difficulty and AMS


      As Evidenced by Signs and Symptoms         Pt noted with decreased


                                                 responsiveness and NGT placed


     Is patient on ventilator?                   No


     Is Patient Ambulatory and/or Out of Bed     No


     REE-([facility]-confined to bed)      2106.012


     Kcal/Kg value to use for calculation        14


     Approximate Energy Requirements Using       1596


      kcal/Kg                                    


     Calculation Used for Recommendations        Kcal/kg


     Additional Notes                            Protein needs are 69-87g (0.8-


                                                 1g/kg adjusted wt 86.5kg)


                                                 Fluid needs are 1ml/kcal


 Nutrition Intervention


     Change Diet Order:                          TF


     Nutrition Support:                          Jevity 1.2 at 55ml/hr


                                                 Flush with 150ml q4h


     Kcal                                        1,584


     Protein (gm)                                73


     Fluid (mL)                                  1,065


     Goal #1                                     Meet at least 80% of kcal and


                                                 protein needs via TF


     Anticipated Discharge Needs:                Unable to determine at this


                                                 time


     Follow-Up By:                               08/19/20


     Additional Comments                         Follow for TF start/tolerance

## 2020-08-18 NOTE — PROGRESS NOTE
Assessment and Plan





Cultures:


Blood culture and urine culture negative


COVID PCR positive


 


Assessment: 55 years old female with history of paranoid schizophrenia, admitted

on 8/13/2020 due to altered mental status and catatonia state:





#Severe sepsis: Present on admission with low-grade fever, tachycardia, elevated

leukocytosis, WHITNEY, likely due to bilateral pneumonia. 





#Bilateral pneumonia: COVID-19 pneumonia.  Inflammatory markers are elevated, 

with a d-dimer > 10,000.  CT negative for pulmonary embolism. Repeat d-dimer 

with much improvement.





#Acute hypoxemic respiratory failure: mild. on supplemental oxygen.





#Elevated LFTs: from COVID.





#WHITNEY: from COVID. improved





#Elevated LFTs: Very high likely secondary to rhabdomyolysis





#Elevated CK: Likely secondary to rhabdomyolysis possible due to COVID-19 in

fection or catatonic state





#Paranoid schizophrenia with catatonia: Per psych





Recommendations:


-continue steroids, day 4 of 10


-renal function and LFTs improving, but given mild hypoxia and several days of 

positivity, unclear benefit with Remdesivir at this stage


-procalcitonin improving, continue ceftriaxone and azithromycin until 8/19/2020


-Continue anticoagulation per protocol








Blank Coppola MD, FACP


[facility] ([facility] ([facility])


C: [phone]


O: [phone]


F: [phone]





Subjective


Date of service: 08/18/20


Interval history: 


No fever.  Oxygen requirements at 2 L nasal cannula.





Objective





- Exam


Narrative Exam: 





Physical Exam (reviewed in chart due to PPE conservation)


Constitutional: limited due to PPE conservation strategy


Head, Ears, Nose: limited due to PPE conservation strategy


Eyes: limited due to PPE conservation strategy


Neck: limited due to PPE conservation strategy


Oral: limited due to PPE conservation strategy


Cardiovascular: limited due to PPE conservation strategy


Respiratory: limited due to PPE conservation strategy


GI: limited due to PPE conservation strategy


Musculoskeletal: limited due to PPE conservation strategy


Skin: limited due to PPE conservation strategy


Hem/Lymphatic: limited due to PPE conservation strategy


Psych: limited due to PPE conservation strategy


Neurological: limited due to PPE conservation strategy 





- Constitutional


Vitals: 


                                   Vital Signs











Temp Pulse Resp BP Pulse Ox


 


 97.5 F L  90   21   136/83   96 


 


 08/18/20 05:52  08/18/20 05:55  08/18/20 05:52  08/18/20 05:52  08/18/20 10:00








                           Temperature -Last 24 Hours











Temperature                    97.5 F


 


Temperature                    98.2 F

















- Labs


CBC & Chem 7: 


                                 08/18/20 08:32





                                 08/18/20 08:32


Labs: 


                              Abnormal lab results











  08/18/20 08/18/20 08/18/20 Range/Units





  08:32 08:32 08:32 


 


WBC  18.4 H    (4.5-11.0)  K/mm3


 


RDW  16.0 H    (13.2-15.2)  %


 


D-Dimer   654.97 H   (0-234)  ng/mlDDU


 


Chloride    110.4 H  ()  mmol/L


 


BUN    20 H  (7-17)  mg/dL


 


Glucose    222 H  ()  mg/dL


 


Calcium    8.0 L  (8.4-10.2)  mg/dL


 


Ferritin     (10.0-200.0)  ng/mL


 


AST    149 H  (5-40)  units/L


 


ALT    96 H  (7-56)  units/L


 


Lactate Dehydrogenase    817 H  ()  units/L


 


Total Creatine Kinase    934 H  ()  units/L


 


C-Reactive Protein    4.20 H  (0.00-1.30)  mg/dL


 


Total Protein    6.1 L  (6.3-8.2)  g/dL


 


Albumin    2.5 L  (3.9-5)  g/dL














  08/18/20 Range/Units





  08:32 


 


WBC   (4.5-11.0)  K/mm3


 


RDW   (13.2-15.2)  %


 


D-Dimer   (0-234)  ng/mlDDU


 


Chloride   ()  mmol/L


 


BUN   (7-17)  mg/dL


 


Glucose   ()  mg/dL


 


Calcium   (8.4-10.2)  mg/dL


 


Ferritin  1325.0 H  (10.0-200.0)  ng/mL


 


AST   (5-40)  units/L


 


ALT   (7-56)  units/L


 


Lactate Dehydrogenase   ()  units/L


 


Total Creatine Kinase   ()  units/L


 


C-Reactive Protein   (0.00-1.30)  mg/dL


 


Total Protein   (6.3-8.2)  g/dL


 


Albumin   (3.9-5)  g/dL

## 2020-08-18 NOTE — PROGRESS NOTE
Subjective





- Reason for Consult


Consult date: 08/18/20


Reason for consult: nonverbal





- Chief Complaint


Chief complaint: 


The patient's medical record was reviewed and the patient's progress was 

discussed with the nursing staff. The nurse note states the patient could not 

take nightly depakote DR due to pill size. 





I attempted to interview the patient today, she is lying in bed, she moves her 

legs up as I'm calling her name but she never opens her eyes. The patient is 

nonverbal. 





REVIEW OF SYSTEMS


Unable to assess due to patient factors





MENTAL STATUS EXAMINATION


Unable to obtain





 ASSESSMENT 


Schizoaffective disorder





RECOMMENDATIONS


MEDICATIONS


     Changed depakote dr to depakene liquid since the patient was unable to 

swallow


Risks, benefits and alternatives of medications discussed with the patient, 

questions answered and consent obtained from patient.


PSYCHOTHERAPY: Supportive psychotherapy provided


MEDICAL: Per primary team


DELIRIUM PRECAUTIONS: Please re-orient patient frequently, keep lights on during

the day, and minimize benzodiazepines and opiates as these medications could 

worsen patient's confusion.


SAFETY SITTER: per medical team


DISPOSITION: Do not recommend acute inpatient psychiatric treatment at this time

due to acute medical conditions (COVID, Rhabdo, Severe sepsis, Pna, WHITNEY, need of

gastric tube feed). Please re-consult once, and if the patient improves 

medically. She has acute and complex medical conditions that supersede her need 

for inpatient psychiatric treatment at this time.


Will sign off. Thank you for the consult. Please contact with any questions 

and/or concerns.








Mental Status Exam





- Vital signs


                                Last Vital Signs











Temp  97.5 F L  08/18/20 05:52


 


Pulse  90   08/18/20 05:55


 


Resp  21   08/18/20 05:52


 


BP  136/83   08/18/20 05:52


 


Pulse Ox  95   08/18/20 05:55

## 2020-08-19 LAB
BUN SERPL-MCNC: 19 MG/DL (ref 7–17)
BUN/CREAT SERPL: 24 %
CALCIUM SERPL-MCNC: 8.1 MG/DL (ref 8.4–10.2)
HCT VFR BLD CALC: 40.1 % (ref 30.3–42.9)
HEMOLYSIS INDEX: 6
HGB BLD-MCNC: 12.7 GM/DL (ref 10.1–14.3)
MCHC RBC AUTO-ENTMCNC: 32 % (ref 30–34)
MCV RBC AUTO: 89 FL (ref 79–97)
PLATELET # BLD: 198 K/MM3 (ref 140–440)
RBC # BLD AUTO: 4.49 M/MM3 (ref 3.65–5.03)

## 2020-08-19 RX ADMIN — INSULIN LISPRO SCH UNIT: 100 INJECTION, SOLUTION INTRAVENOUS; SUBCUTANEOUS at 06:22

## 2020-08-19 RX ADMIN — CEFTRIAXONE SODIUM SCH MLS/HR: 2 INJECTION, POWDER, FOR SOLUTION INTRAMUSCULAR; INTRAVENOUS at 11:47

## 2020-08-19 RX ADMIN — HYDRALAZINE HYDROCHLORIDE PRN MG: 20 INJECTION INTRAMUSCULAR; INTRAVENOUS at 01:16

## 2020-08-19 RX ADMIN — Medication SCH ML: at 11:49

## 2020-08-19 RX ADMIN — INSULIN LISPRO SCH UNIT: 100 INJECTION, SOLUTION INTRAVENOUS; SUBCUTANEOUS at 13:09

## 2020-08-19 RX ADMIN — ENOXAPARIN SODIUM SCH MG: 100 INJECTION SUBCUTANEOUS at 11:48

## 2020-08-19 RX ADMIN — SODIUM CHLORIDE SCH MLS/HR: 0.45 INJECTION, SOLUTION INTRAVENOUS at 11:46

## 2020-08-19 RX ADMIN — HALOPERIDOL SCH MG: 5 TABLET ORAL at 11:48

## 2020-08-19 RX ADMIN — INSULIN LISPRO SCH UNIT: 100 INJECTION, SOLUTION INTRAVENOUS; SUBCUTANEOUS at 01:16

## 2020-08-19 RX ADMIN — INSULIN LISPRO SCH UNIT: 100 INJECTION, SOLUTION INTRAVENOUS; SUBCUTANEOUS at 17:39

## 2020-08-19 RX ADMIN — METHYLPREDNISOLONE SODIUM SUCCINATE SCH MG: 40 INJECTION, POWDER, FOR SOLUTION INTRAMUSCULAR; INTRAVENOUS at 06:22

## 2020-08-19 NOTE — PROGRESS NOTE
<PADMA AMES LAURENMarco A - Last Filed: 08/19/20 16:17>





Assessment and Plan





- Patient Problems


(1) Severe sepsis


Current Visit: Yes   Status: Acute   


Plan to address problem: 


- Presented with low-grade fever, tachycardia, leukocytosis, WHITNEY, and bilateral 

pneumonia


- Likely source is bilateral pneumonia secondary to COVID-19


- IV antibiotics completed


- Covid protocol initiated











(2) Pneumonia due to COVID-19 virus


Current Visit: Yes   Status: Acute   


Plan to address problem: 


- COVID protocol initiated


- IV azithromycin and Rocephin completed


- 8/14 COVID PCR positive


- Trend LDH, CRP, procalcitonin, d-dimer, ferritin 


- Droplet /contact precautions


- Supplemental oxygenation as needed


- Infectious disease consult requested


- Pulmonary hygiene


- In setting of elevated D-Dimer, CTA Chest was ordered which was negative for 

pulmonary embolism


- Anticoagulation per COVID protocol


- Cannot initiate Remdesivir in setting on of elevated LFTs


- Continue p.o. dexamethasone








(3) Neuroleptic malignant syndrome


Current Visit: Yes   Status: Ruled-out   





(4) Rhabdomyolysis


Current Visit: Yes   Status: Acute   


Plan to address problem: 


- [place] [address], down to 2599


- Received 2 liters IVF in ED and NS then 1/2 NS  mL/hr


- Trend CK levels








(5) Acute psychosis


Current Visit: Yes   Status: Acute   


Plan to address problem: 


- Currently withdraws to pain however moving all extremities spontaneously


- History of paranoid schizophrenia


- Mental health consult requested and does not recommend inpatient psych at this

time as medical needs outweigh psych this time


- Requested to reconsult when patient is medically stable








(6) Paranoid schizophrenia


Current Visit: Yes   Status: Chronic   


Plan to address problem: 


-History of present schizophrenia


-Psych consulted and appreciate recommendations


-Depakote and Haldol 


-Sleep hygiene


-Reorientation as needed








(7) Acute renal failure


Current Visit: Yes   Status: Resolved   


Plan to address problem: 


- Vasomotor nephropathy maybe secondary to dehydration and rhabdomyolysis 


- Received 2 L IVF in ED


- Admit Cr/BUN 1.4/33 but now trended down to 0.8-0.9


- Avoid nephrotoxic medications


- Trend BMP








(8) Leukocytosis


Current Visit: Yes   Status: Acute   


Plan to address problem: 


- Admit WBC 13.8


- Trend CBC


- Maybe secondary to possible PNA or from dehydration


- IV abx completed


- Also on steroids for COVID pneumonia








(9) Thrombocytopenia


Current Visit: Yes   Status: Resolved   





(10) DVT prophylaxis


Current Visit: Yes   Status: Acute   


Plan to address problem: 


- SCDs to BLE while in bed


- Lovenox subq twice daily per COVID protocol








History


Interval history: 





This is a 55-year-old female with paranoid schizophrenia that presents to the 

emergency department on 8/13 for altered mental status in a catatonic state.  

Previous hospitalizations reviewed and only notes a history of paranoid 

schizophrenia. Patient may be a resident of [place].  Patient is currently 

nonverbal and HPI is received from ER documentation.  Work-up in the emergency 

department included a CT abdomen pelvis which shows patchy peripheral 

groundglass opacities bilateral lungs which is consistent with either viral or 

atypical pneumonia, positive anterior lateral fat hernia in the left mid abdomen

and a large uterine fibroids however her CXR showed no acute pulmonary or 

pleural abnormalities.  She was found to have acute kidney injury with a 

creatinine of 1.4/BUN 33 as baseline from previous records seems to be 0.7, 

rhabdomyolysis with a creatinine kinase of 33,481, leukocytosis with a WBC of 

13.8, thrombocytopenia with platelets at 97.  She has COVID pneumonia with IV 

antibiotics, rhabdomyolysis, and WHITNEY. Infectious disease, psych, and neurology 

have been consulted. Neurology does not suspect NMS at this time. Psych has 

signed off at this time due to ongoing acute medical processes.  PT/ST consulted

for evaluation but unable to be performed as patient will not follow verbal 

commands. At the time of my exam is patient verbalized to painful stimuli ho

wever she does not open her eyes to verbal or physical stimuli. 





* Discussed with primary caretaker patient was normally verbal but sometimes 

  goes into a catatonic state.  Phone number for this is [phone]


* 8/18: PT consulted.  Psych signed off.  Continue treatment


* 8/17: continue treatment


* 8/16: Change fluids to NS 1/2 AT 125CC/hr. Rhabdomylysis improving, insert NGT

  and start tube feeds. Psych input noted. Continue management for COVID 19. ID 

  consulted. No new fever.  CTA and CTH obtained which were both negative


* 8/15: COVID PCR Positive








Hospitalist Physical





- Constitutional


Vitals: 


                                        











Temp Pulse Resp BP Pulse Ox


 


 98.5 F   104 H  24   119/97   94 


 


 08/19/20 11:40  08/19/20 11:40  08/19/20 11:40  08/19/20 11:40  08/19/20 11:40











General appearance: Present: no acute distress, other (Patient slightly opens 

her eyes to tactile stimuli and withdraws to painful stimuli in all 4 extremit

ies.  She is seen MCFADDEN spontaneously.)





- EENT


Eyes: Present: PERRL





- Neck


Neck: Present: normal ROM





- Respiratory


Respiratory effort: normal


Respiratory: bilateral: diminished





- Cardiovascular


Rhythm: regular


Heart Sounds: Present: S1 & S2, systolic murmur.  Absent: diastolic murmur





- Extremities


Extremities: no ischemia, pulses intact, pulses symmetrical, No edema, normal 

temperature, normal color, Full ROM


Peripheral Pulses: within normal limits





- Abdominal


General gastrointestinal: soft, non-tender, non-distended, normal bowel sounds





- Integumentary


Integumentary: Present: clear, warm, dry





- Neurologic


Neurologic: no focal deficits, moves all extremities, other (Moves all her 

extremities to painful stimuli.  Does not open eyes to verbal or painful 

stimuli)





- Allied Health


Allied health notes reviewed: nursing, PT, social work, case management





Results





- Labs


CBC & Chem 7: 


                                 08/19/20 09:26





                                 08/19/20 05:17


Labs: 


                             Laboratory Last Values











WBC  16.2 K/mm3 (4.5-11.0)  H  08/19/20  09:26    


 


RBC  4.49 M/mm3 (3.65-5.03)   08/19/20  09:26    


 


Hgb  12.7 gm/dl (10.1-14.3)   08/19/20  09:26    


 


Hct  40.1 % (30.3-42.9)   08/19/20  09:26    


 


MCV  89 fl (79-97)   08/19/20  09:26    


 


MCH  28 pg (28-32)   08/19/20  09:26    


 


MCHC  32 % (30-34)   08/19/20  09:26    


 


RDW  16.1 % (13.2-15.2)  H  08/19/20  09:26    


 


Plt Count  198 K/mm3 (140-440)   08/19/20  09:26    


 


Lymph % (Auto)  10.6 % (13.4-35.0)  L  08/13/20  22:44    


 


Mono % (Auto)  9.8 % (0.0-7.3)  H  08/13/20  22:44    


 


Eos % (Auto)  0.0 % (0.0-4.3)   08/13/20  22:44    


 


Baso % (Auto)  1.2 % (0.0-1.8)   08/13/20  22:44    


 


Lymph #  1.5 K/mm3 (1.2-5.4)   08/13/20  22:44    


 


Mono #  1.3 K/mm3 (0.0-0.8)  H  08/13/20  22:44    


 


Eos #  0.0 K/mm3 (0.0-0.4)   08/13/20  22:44    


 


Baso #  0.2 K/mm3 (0.0-0.1)  H  08/13/20  22:44    


 


Add Manual Diff  Complete   08/15/20  05:08    


 


Total Counted  100   08/15/20  05:08    


 


Seg Neutrophils %  78.4 % (40.0-70.0)  H  08/13/20  22:44    


 


Seg Neuts % (Manual)  72.0 % (40.0-70.0)  H  08/15/20  05:08    


 


Band Neutrophils %  8.0 %  08/15/20  05:08    


 


Lymphocytes % (Manual)  13.0 % (13.4-35.0)  L  08/15/20  05:08    


 


Reactive Lymphs % (Man)  0 %  08/15/20  05:08    


 


Monocytes % (Manual)  7.0 % (0.0-7.3)   08/15/20  05:08    


 


Eosinophils % (Manual)  0 % (0.0-4.3)   08/15/20  05:08    


 


Basophils % (Manual)  0 % (0.0-1.8)   08/15/20  05:08    


 


Metamyelocytes %  0 %  08/15/20  05:08    


 


Myelocytes %  0 %  08/15/20  05:08    


 


Promyelocytes %  0 %  08/15/20  05:08    


 


Blast Cells %  0 %  08/15/20  05:08    


 


Nucleated RBC %  Not Reportable   08/15/20  05:08    


 


Seg Neutrophils #  10.8 K/mm3 (1.8-7.7)  H  08/13/20  22:44    


 


Seg Neutrophils # Man  7.5 K/mm3 (1.8-7.7)   08/15/20  05:08    


 


Band Neutrophils #  0.8 K/mm3  08/15/20  05:08    


 


Lymphocytes # (Manual)  1.4 K/mm3 (1.2-5.4)   08/15/20  05:08    


 


Abs React Lymphs (Man)  0.0 K/mm3  08/15/20  05:08    


 


Monocytes # (Manual)  0.7 K/mm3 (0.0-0.8)   08/15/20  05:08    


 


Eosinophils # (Manual)  0.0 K/mm3 (0.0-0.4)   08/15/20  05:08    


 


Basophils # (Manual)  0.0 K/mm3 (0.0-0.1)   08/15/20  05:08    


 


Metamyelocytes #  0.0 K/mm3  08/15/20  05:08    


 


Myelocytes #  0.0 K/mm3  08/15/20  05:08    


 


Promyelocytes #  0.0 K/mm3  08/15/20  05:08    


 


Blast Cells #  0.0 K/mm3  08/15/20  05:08    


 


WBC Morphology  Not Reportable   08/15/20  05:08    


 


Hypersegmented Neuts  Not Reportable   08/15/20  05:08    


 


Hyposegmented Neuts  Not Reportable   08/15/20  05:08    


 


Hypogranular Neuts  Not Reportable   08/15/20  05:08    


 


Smudge Cells  Not Reportable   08/15/20  05:08    


 


Toxic Granulation  Not Reportable   08/15/20  05:08    


 


Toxic Vacuolation  Not Reportable   08/15/20  05:08    


 


Dohle Bodies  Not Reportable   08/15/20  05:08    


 


Pelger-Huet Anomaly  Not Reportable   08/15/20  05:08    


 


Arnie Rods  Not Reportable   08/15/20  05:08    


 


Platelet Estimate  Consistent w auto   08/15/20  05:08    


 


Clumped Platelets  Not Reportable   08/15/20  05:08    


 


Plt Clumps, EDTA  Not Reportable   08/15/20  05:08    


 


Large Platelets  Not Reportable   08/15/20  05:08    


 


Giant Platelets  Not Reportable   08/15/20  05:08    


 


Platelet Satelliting  Not Reportable   08/15/20  05:08    


 


Plt Morphology Comment  Not Reportable   08/15/20  05:08    


 


RBC Morphology  Not Reportable   08/15/20  05:08    


 


Dimorphic RBCs  Not Reportable   08/15/20  05:08    


 


Polychromasia  Not Reportable   08/15/20  05:08    


 


Hypochromasia  Not Reportable   08/15/20  05:08    


 


Poikilocytosis  Not Reportable   08/15/20  05:08    


 


Anisocytosis  1+   08/15/20  05:08    


 


Microcytosis  Not Reportable   08/15/20  05:08    


 


Macrocytosis  Not Reportable   08/15/20  05:08    


 


Spherocytes  Not Reportable   08/15/20  05:08    


 


Pappenheimer Bodies  Not Reportable   08/15/20  05:08    


 


Sickle Cells  Not Reportable   08/15/20  05:08    


 


Target Cells  Not Reportable   08/15/20  05:08    


 


Tear Drop Cells  Not Reportable   08/15/20  05:08    


 


Ovalocytes  Not Reportable   08/15/20  05:08    


 


Helmet Cells  Not Reportable   08/15/20  05:08    


 


Mancera-Castleberry Bodies  Not Reportable   08/15/20  05:08    


 


Cabot Rings  Not Reportable   08/15/20  05:08    


 


Mckenna Cells  Not Reportable   08/15/20  05:08    


 


Bite Cells  Not Reportable   08/15/20  05:08    


 


Crenated Cell  Not Reportable   08/15/20  05:08    


 


Elliptocytes  Not Reportable   08/15/20  05:08    


 


Acanthocytes (Spur)  Not Reportable   08/15/20  05:08    


 


Rouleaux  Not Reportable   08/15/20  05:08    


 


Hemoglobin C Crystals  Not Reportable   08/15/20  05:08    


 


Schistocytes  Not Reportable   08/15/20  05:08    


 


Malaria parasites  Not Reportable   08/15/20  05:08    


 


Dakota Bodies  Not Reportable   08/15/20  05:08    


 


Hem Pathologist Commnt  No   08/15/20  05:08    


 


D-Dimer  654.97 ng/mlDDU (0-234)  H  08/18/20  08:32    


 


Sodium  149 mmol/L (137-145)  H  08/19/20  05:17    


 


Potassium  3.9 mmol/L (3.6-5.0)   08/19/20  05:17    


 


Chloride  110.6 mmol/L ()  H  08/19/20  05:17    


 


Carbon Dioxide  25 mmol/L (22-30)   08/19/20  05:17    


 


Anion Gap  17 mmol/L  08/19/20  05:17    


 


BUN  19 mg/dL (7-17)  H  08/19/20  05:17    


 


Creatinine  0.8 mg/dL (0.6-1.2)   08/19/20  05:17    


 


Estimated GFR  > 60 ml/min  08/19/20  05:17    


 


BUN/Creatinine Ratio  24 %  08/19/20  05:17    


 


Glucose  356 mg/dL ()  H  08/19/20  05:17    


 


POC Glucose  327  ()  H  08/19/20  13:12    


 


Lactic Acid  1.30 mmol/L (0.7-2.0)   08/13/20  22:44    


 


Calcium  8.1 mg/dL (8.4-10.2)  L  08/19/20  05:17    


 


Ferritin  1325.0 ng/mL (10.0-200.0)  H  08/18/20  08:32    


 


Total Bilirubin  0.40 mg/dL (0.1-1.2)   08/18/20  08:32    


 


Direct Bilirubin  0.3 mg/dL (0-0.2)  H  08/13/20  22:44    


 


Indirect Bilirubin  0.2 mg/dL  08/13/20  22:44    


 


AST  149 units/L (5-40)  H  08/18/20  08:32    


 


ALT  96 units/L (7-56)  H  08/18/20  08:32    


 


Alkaline Phosphatase  40 units/L ()   08/18/20  08:32    


 


Lactate Dehydrogenase  817 units/L ()  H  08/18/20  08:32    


 


Total Creatine Kinase  934 units/L ()  H  08/18/20  08:32    


 


C-Reactive Protein  4.20 mg/dL (0.00-1.30)  H  08/18/20  08:32    


 


Total Protein  6.1 g/dL (6.3-8.2)  L  08/18/20  08:32    


 


Albumin  2.5 g/dL (3.9-5)  L  08/18/20  08:32    


 


Albumin/Globulin Ratio  0.7 %  08/18/20  08:32    


 


Procalcitonin  0.71 ng/mL (<0.15)   08/18/20  08:32    


 


Urine Color  Tiffany  (Yellow)   08/14/20  Unknown


 


Urine Turbidity  Slightly-cloudy  (Clear)   08/14/20  Unknown


 


Urine pH  5.0  (5.0-7.0)   08/14/20  Unknown


 


Ur Specific Gravity  1.026  (1.003-1.030)   08/14/20  Unknown


 


Urine Protein  100 mg/dl mg/dL (Negative)   08/14/20  Unknown


 


Urine Glucose (UA)  Neg mg/dL (Negative)   08/14/20  Unknown


 


Urine Ketones  Tr mg/dL (Negative)   08/14/20  Unknown


 


Urine Blood  Lg  (Negative)   08/14/20  Unknown


 


Urine Nitrite  Neg  (Negative)   08/14/20  Unknown


 


Urine Bilirubin  Neg  (Negative)   08/14/20  Unknown


 


Urine Urobilinogen  2.0 mg/dL (<2.0)   08/14/20  Unknown


 


Ur Leukocyte Esterase  Neg  (Negative)   08/14/20  Unknown


 


Urine WBC (Auto)  17.0 /HPF (0.0-6.0)  H  08/14/20  Unknown


 


Urine RBC (Auto)  1.0 /HPF (0.0-6.0)   08/14/20  Unknown


 


U Epithel Cells (Auto)  4.0 /HPF (0-13.0)   08/14/20  Unknown


 


Urine Mucus  2+ /HPF  08/14/20  Unknown


 


Salicylates  < 0.3 mg/dL (2.8-20.0)  L  08/13/20  22:44    


 


Urine Opiates Screen  Presumptive negative   08/14/20  Unknown


 


Urine Methadone Screen  Presumptive negative   08/14/20  Unknown


 


Acetaminophen  5.0 ug/mL (10.0-30.0)  L  08/13/20  22:44    


 


Ur Barbiturates Screen  Presumptive negative   08/14/20  Unknown


 


Valproic Acid  31.8 ug/mL ()  L  08/17/20  08:46    


 


Ur Phencyclidine Scrn  Presumptive negative   08/14/20  Unknown


 


Ur Amphetamines Screen  Presumptive negative   08/14/20  Unknown


 


U Benzodiazepines Scrn  Presumptive negative   08/14/20  Unknown


 


Urine Cocaine Screen  Presumptive negative   08/14/20  Unknown


 


U Marijuana (THC) Screen  Presumptive negative   08/14/20  Unknown


 


Drugs of Abuse Note  Disclamer   08/14/20  Unknown


 


Plasma/Serum Alcohol  < 0.01 % (0-0.07)   08/13/20  22:44    


 


Coronavirus (PCR)  Positive  (Negative)  A  08/15/20  Unknown











Microbiology: 


Microbiology





08/14/20 01:40   Peripheral/Venous   Blood Culture - Final


                            NO GROWTH AFTER 5 DAYS


08/14/20 01:59   Peripheral/Venous   Blood Culture - Final


                            NO GROWTH AFTER 5 DAYS








Schmidt/IV: 


                                        





Voiding Method                   Suprapubic catheter


IV Catheter Type [Right Upper    INT / Saline Lock


arm]                             


IV Catheter Type [Right          INT / Saline Lock


Antecubital]                     


IV Catheter Type [Right Foot]    INT / Saline Lock


IV Catheter Type [Right          Peripheral IV


External Jugular]                











Active Medications





- Current Medications


Current Medications: 














Generic Name Dose Route Start Last Admin





  Trade Name Freq  PRN Reason Stop Dose Admin


 


Acetaminophen  650 mg  08/14/20 08:00  08/17/20 05:30





  Tylenol  PO   650 mg





  Q4H PRN   Administration





  Pain MILD(1-3)/Fever >100.5/HA  


 


Lipase/Protease/Amylase  1 each  08/17/20 10:34 





  Pancreaze  10,500 Unit  FEEDTUBE  





  PRN PRN  





  For Clogged Feeding Tube  


 


Enoxaparin Sodium  40 mg  08/17/20 22:00  08/19/20 11:48





  Enoxaparin  SUB-Q   40 mg





  BID KATHYA   Administration


 


Haloperidol  5 mg  08/15/20 12:00  08/19/20 11:48





  Haldol  PO   5 mg





  BID KATHYA   Administration


 


Hydralazine HCl  10 mg  08/19/20 00:38  08/19/20 01:16





  Apresoline  IV   10 mg





  Q6HR PRN   Administration





  Hypertension  


 


Sodium Chloride  1,000 mls @ 75 mls/hr  08/19/20 12:00  08/19/20 11:46





  Nacl 0.45% 1000 Ml  IV   75 mls/hr





  AS DIRECT KATHYA   Administration


 


Insulin Human Lispro  0 unit  08/19/20 01:00  08/19/20 13:09





  Humalog  SUB-Q   6 unit





  Q6HR KATHYA   Administration





  Protocol  


 


Methylprednisolone Sodium Succinate  40 mg  08/20/20 10:00 





  Solu-Medrol  IV  





  Q24HR KATHYA  


 


Miscellaneous Medication  325 mg  08/15/20 18:45 





  Paliperidone Palmitate [Invega Sustenna]  IM  





  Q3W KATHYA  


 


Ondansetron HCl  4 mg  08/14/20 08:00 





  Zofran  IV  





  Q8H PRN  





  Nausea And Vomiting  


 


Simple Syrup  15 ml  08/17/20 10:34 





  Simple Syrup  FEEDTUBE  





  PRN PRN  





  Hypoglycemia  


 


Simple Syrup  30 ml  08/17/20 10:34 





  Simple Syrup  FEEDTUBE  





  PRN PRN  





  Hypoglycemia  


 


Sodium Bicarbonate  325 mg  08/17/20 10:34 





  Sodium Bicarbonate  FEEDTUBE  





  PRN PRN  





  For Clogged Feeding Tube  


 


Sodium Chloride  10 ml  08/14/20 10:00  08/19/20 11:49





  Sodium Chloride Flush Syringe 10 Ml  IV   10 ml





  BID KATHYA   Administration


 


Sodium Chloride  10 ml  08/14/20 07:50 





  Sodium Chloride Flush Syringe 10 Ml  IV  08/27/20 07:49 





  PRN PRN  





  LINE FLUSH  


 


Trazodone HCl  50 mg  08/14/20 22:00  08/18/20 22:32





  Desyrel  PO   50 mg





  QHS KATHYA   Administration


 


Valproic Acid  1,500 mg  08/18/20 22:00  08/18/20 22:32





  Depakene Liq  PO   1,500 mg





  QHS KATHYA   Administration














Nutrition/Malnutrition Assess





- Dietary Evaluation


Nutrition/Malnutrition Findings: 


                                        





Nutrition Notes                                            Start:  08/17/20 

09:33


Freq:                                                      Status: Active       




Protocol:                                                                       




 Document     08/19/20 14:52  LM  (Rec: 08/19/20 14:56  LM  [id number])


 Nutrition Notes


     Initial or Follow up                        Reassessment


     Current Diagnosis                           Acute Kidney Injury,Sepsis


     Other Pertinent Diagnosis                   COVID-19 (+), Schizoaffective


                                                 disorder


     Current Diet                                Jevity 1.2 at 55ml/hr


     Labs/Tests                                  Na 149


                                                 BUN 19


                                                 


     Pertinent Medications                       Solumedrol


                                                 Humalog


     Height                                      5 ft 6 in


     Weight                                      114 kg


     Ideal Body Weight (kg)                      59.09


     BMI                                         40.5


     Weight Status                               Morbidly Obese


     Subjective/Other Information                TF running at goal rate and pt


                                                 is tolerating. SLP recommends


                                                 pt to continue with TF.


     Burn                                        Absent


     Trauma                                      Absent


     GI Symptoms                                 None


     Difficulty In                               Swallowing


     Food Allergy                                No


     Current % PO                                Negligible


     Minimum of two criteria                     No


     Reduced  Strength                       Measurably Reduced (severe)


     #1


      Nutrition Diagnosis                        Inadequate oral intake


      Diagnosis Progress(for reassessment        Continues


       documentation)                            


     Is patient on ventilator?                   No


     Is Patient Ambulatory and/or Out of Bed     No


     REE-([facility]-confined to bed)      2106.012


     Kcal/Kg value to use for calculation        14


     Approximate Energy Requirements Using       1596


      kcal/Kg                                    


     Calculation Used for Recommendations        Kcal/kg


     Additional Notes                            Protein needs are 69-87g (0.8-


                                                 1g/kg adjusted wt 86.5kg)


                                                 Fluid needs are 1ml/kcal


 Nutrition Intervention


     Change Diet Order:                          TF


     Nutrition Support:                          Jevity 1.2 at 55ml/hr


                                                 Flush with 200ml q4h for


                                                 hypernatremia


     Kcal                                        1,584


     Protein (gm)                                73


     Fluid (mL)                                  1,065


     Goal #1                                     Meet at least 80% of kcal and


                                                 protein needs via TF


     Anticipated Discharge Needs:                Unable to determine at this


                                                 time


     Follow-Up By:                               08/21/20


     Additional Comments                         F/U for TF tolerance, BG, Na











<JING CERRATO R - Last Filed: 08/20/20 17:21>





Assessment and Plan


Assessment and plan: 





I saw and evaluated the patient. I agree with the findings and the plan of care 

as documented in the Nurse Practitioner's~note,








Hospitalist Physical





- Constitutional


Vitals: 


                                        











Temp Pulse Resp BP Pulse Ox


 


 97.9 F   100 H  24   158/107   100 


 


 08/20/20 11:59  08/20/20 11:59  08/20/20 11:59  08/20/20 11:59  08/20/20 11:59














Results





- Labs


CBC & Chem 7: 


                                 08/20/20 06:14





                                 08/20/20 06:14


Labs: 


                             Laboratory Last Values











WBC  18.4 K/mm3 (4.5-11.0)  H  08/20/20  06:14    


 


RBC  4.47 M/mm3 (3.65-5.03)   08/20/20  06:14    


 


Hgb  13.0 gm/dl (10.1-14.3)   08/20/20  06:14    


 


Hct  39.5 % (30.3-42.9)   08/20/20  06:14    


 


MCV  88 fl (79-97)   08/20/20  06:14    


 


MCH  29 pg (28-32)   08/20/20  06:14    


 


MCHC  33 % (30-34)   08/20/20  06:14    


 


RDW  15.8 % (13.2-15.2)  H  08/20/20  06:14    


 


Plt Count  213 K/mm3 (140-440)   08/20/20  06:14    


 


Lymph % (Auto)  10.6 % (13.4-35.0)  L  08/13/20  22:44    


 


Mono % (Auto)  9.8 % (0.0-7.3)  H  08/13/20  22:44    


 


Eos % (Auto)  0.0 % (0.0-4.3)   08/13/20  22:44    


 


Baso % (Auto)  1.2 % (0.0-1.8)   08/13/20  22:44    


 


Lymph #  1.5 K/mm3 (1.2-5.4)   08/13/20  22:44    


 


Mono #  1.3 K/mm3 (0.0-0.8)  H  08/13/20  22:44    


 


Eos #  0.0 K/mm3 (0.0-0.4)   08/13/20  22:44    


 


Baso #  0.2 K/mm3 (0.0-0.1)  H  08/13/20  22:44    


 


Add Manual Diff  Complete   08/15/20  05:08    


 


Total Counted  100   08/15/20  05:08    


 


Seg Neutrophils %  78.4 % (40.0-70.0)  H  08/13/20  22:44    


 


Seg Neuts % (Manual)  72.0 % (40.0-70.0)  H  08/15/20  05:08    


 


Band Neutrophils %  8.0 %  08/15/20  05:08    


 


Lymphocytes % (Manual)  13.0 % (13.4-35.0)  L  08/15/20  05:08    


 


Reactive Lymphs % (Man)  0 %  08/15/20  05:08    


 


Monocytes % (Manual)  7.0 % (0.0-7.3)   08/15/20  05:08    


 


Eosinophils % (Manual)  0 % (0.0-4.3)   08/15/20  05:08    


 


Basophils % (Manual)  0 % (0.0-1.8)   08/15/20  05:08    


 


Metamyelocytes %  0 %  08/15/20  05:08    


 


Myelocytes %  0 %  08/15/20  05:08    


 


Promyelocytes %  0 %  08/15/20  05:08    


 


Blast Cells %  0 %  08/15/20  05:08    


 


Nucleated RBC %  Not Reportable   08/15/20  05:08    


 


Seg Neutrophils #  10.8 K/mm3 (1.8-7.7)  H  08/13/20  22:44    


 


Seg Neutrophils # Man  7.5 K/mm3 (1.8-7.7)   08/15/20  05:08    


 


Band Neutrophils #  0.8 K/mm3  08/15/20  05:08    


 


Lymphocytes # (Manual)  1.4 K/mm3 (1.2-5.4)   08/15/20  05:08    


 


Abs React Lymphs (Man)  0.0 K/mm3  08/15/20  05:08    


 


Monocytes # (Manual)  0.7 K/mm3 (0.0-0.8)   08/15/20  05:08    


 


Eosinophils # (Manual)  0.0 K/mm3 (0.0-0.4)   08/15/20  05:08    


 


Basophils # (Manual)  0.0 K/mm3 (0.0-0.1)   08/15/20  05:08    


 


Metamyelocytes #  0.0 K/mm3  08/15/20  05:08    


 


Myelocytes #  0.0 K/mm3  08/15/20  05:08    


 


Promyelocytes #  0.0 K/mm3  08/15/20  05:08    


 


Blast Cells #  0.0 K/mm3  08/15/20  05:08    


 


WBC Morphology  Not Reportable   08/15/20  05:08    


 


Hypersegmented Neuts  Not Reportable   08/15/20  05:08    


 


Hyposegmented Neuts  Not Reportable   08/15/20  05:08    


 


Hypogranular Neuts  Not Reportable   08/15/20  05:08    


 


Smudge Cells  Not Reportable   08/15/20  05:08    


 


Toxic Granulation  Not Reportable   08/15/20  05:08    


 


Toxic Vacuolation  Not Reportable   08/15/20  05:08    


 


Dohle Bodies  Not Reportable   08/15/20  05:08    


 


Pelger-Huet Anomaly  Not Reportable   08/15/20  05:08    


 


Arnie Rods  Not Reportable   08/15/20  05:08    


 


Platelet Estimate  Consistent w auto   08/15/20  05:08    


 


Clumped Platelets  Not Reportable   08/15/20  05:08    


 


Plt Clumps, EDTA  Not Reportable   08/15/20  05:08    


 


Large Platelets  Not Reportable   08/15/20  05:08    


 


Giant Platelets  Not Reportable   08/15/20  05:08    


 


Platelet Satelliting  Not Reportable   08/15/20  05:08    


 


Plt Morphology Comment  Not Reportable   08/15/20  05:08    


 


RBC Morphology  Not Reportable   08/15/20  05:08    


 


Dimorphic RBCs  Not Reportable   08/15/20  05:08    


 


Polychromasia  Not Reportable   08/15/20  05:08    


 


Hypochromasia  Not Reportable   08/15/20  05:08    


 


Poikilocytosis  Not Reportable   08/15/20  05:08    


 


Anisocytosis  1+   08/15/20  05:08    


 


Microcytosis  Not Reportable   08/15/20  05:08    


 


Macrocytosis  Not Reportable   08/15/20  05:08    


 


Spherocytes  Not Reportable   08/15/20  05:08    


 


Pappenheimer Bodies  Not Reportable   08/15/20  05:08    


 


Sickle Cells  Not Reportable   08/15/20  05:08    


 


Target Cells  Not Reportable   08/15/20  05:08    


 


Tear Drop Cells  Not Reportable   08/15/20  05:08    


 


Ovalocytes  Not Reportable   08/15/20  05:08    


 


Helmet Cells  Not Reportable   08/15/20  05:08    


 


Mancera-Castleberry Bodies  Not Reportable   08/15/20  05:08    


 


Cabot Rings  Not Reportable   08/15/20  05:08    


 


Jd Cells  Not Reportable   08/15/20  05:08    


 


Bite Cells  Not Reportable   08/15/20  05:08    


 


Crenated Cell  Not Reportable   08/15/20  05:08    


 


Elliptocytes  Not Reportable   08/15/20  05:08    


 


Acanthocytes (Spur)  Not Reportable   08/15/20  05:08    


 


Rouleaux  Not Reportable   08/15/20  05:08    


 


Hemoglobin C Crystals  Not Reportable   08/15/20  05:08    


 


Schistocytes  Not Reportable   08/15/20  05:08    


 


Malaria parasites  Not Reportable   08/15/20  05:08    


 


D[place]ota Bodies  Not Reportable   08/15/20  05:08    


 


Hem Pathologist Commnt  No   08/15/20  05:08    


 


PT  14.9 Sec. (12.2-14.9)   08/20/20  15:23    


 


INR  1.15  (0.87-1.13)  H  08/20/20  15:23    


 


APTT  29.5 Sec. (24.2-36.6)   08/20/20  15:23    


 


D-Dimer  674.88 ng/mlDDU (0-234)  H  08/20/20  06:14    


 


Sodium  148 mmol/L (137-145)  H  08/20/20  06:14    


 


Potassium  3.7 mmol/L (3.6-5.0)   08/20/20  06:14    


 


Chloride  109.4 mmol/L ()  H  08/20/20  06:14    


 


Carbon Dioxide  26 mmol/L (22-30)   08/20/20  06:14    


 


Anion Gap  16 mmol/L  08/20/20  06:14    


 


BUN  16 mg/dL (7-17)   08/20/20  06:14    


 


Creatinine  0.8 mg/dL (0.6-1.2)   08/20/20  06:14    


 


Estimated GFR  > 60 ml/min  08/20/20  06:14    


 


BUN/Creatinine Ratio  20 %  08/20/20  06:14    


 


Glucose  275 mg/dL ()  H  08/20/20  06:14    


 


POC Glucose  282  ()  H  08/20/20  12:08    


 


Lactic Acid  1.30 mmol/L (0.7-2.0)   08/13/20  22:44    


 


Calcium  8.1 mg/dL (8.4-10.2)  L  08/20/20  06:14    


 


Ferritin  1445.0 ng/mL (10.0-200.0)  H  08/20/20  06:14    


 


Total Bilirubin  0.40 mg/dL (0.1-1.2)   08/18/20  08:32    


 


Direct Bilirubin  0.3 mg/dL (0-0.2)  H  08/13/20  22:44    


 


Indirect Bilirubin  0.2 mg/dL  08/13/20  22:44    


 


AST  149 units/L (5-40)  H  08/18/20  08:32    


 


ALT  96 units/L (7-56)  H  08/18/20  08:32    


 


Alkaline Phosphatase  40 units/L ()   08/18/20  08:32    


 


Lactate Dehydrogenase  812 units/L ()  H  08/20/20  06:14    


 


Total Creatine Kinase  934 units/L ()  H  08/18/20  08:32    


 


C-Reactive Protein  1.70 mg/dL (0.00-1.30)  H  08/20/20  06:14    


 


Total Protein  6.1 g/dL (6.3-8.2)  L  08/18/20  08:32    


 


Albumin  2.5 g/dL (3.9-5)  L  08/18/20  08:32    


 


Albumin/Globulin Ratio  0.7 %  08/18/20  08:32    


 


Procalcitonin  0.71 ng/mL (<0.15)   08/18/20  08:32    


 


Urine Color  Tiffany  (Yellow)   08/14/20  Unknown


 


Urine Turbidity  Slightly-cloudy  (Clear)   08/14/20  Unknown


 


Urine pH  5.0  (5.0-7.0)   08/14/20  Unknown


 


Ur Specific Gravity  1.026  (1.003-1.030)   08/14/20  Unknown


 


Urine Protein  100 mg/dl mg/dL (Negative)   08/14/20  Unknown


 


Urine Glucose (UA)  Neg mg/dL (Negative)   08/14/20  Unknown


 


Urine Ketones  Tr mg/dL (Negative)   08/14/20  Unknown


 


Urine Blood  Lg  (Negative)   08/14/20  Unknown


 


Urine Nitrite  Neg  (Negative)   08/14/20  Unknown


 


Urine Bilirubin  Neg  (Negative)   08/14/20  Unknown


 


Urine Urobilinogen  2.0 mg/dL (<2.0)   08/14/20  Unknown


 


Ur Leukocyte Esterase  Neg  (Negative)   08/14/20  Unknown


 


Urine WBC (Auto)  17.0 /HPF (0.0-6.0)  H  08/14/20  Unknown


 


Urine RBC (Auto)  1.0 /HPF (0.0-6.0)   08/14/20  Unknown


 


U Epithel Cells (Auto)  4.0 /HPF (0-13.0)   08/14/20  Unknown


 


Urine Mucus  2+ /HPF  08/14/20  Unknown


 


Salicylates  < 0.3 mg/dL (2.8-20.0)  L  08/13/20  22:44    


 


Urine Opiates Screen  Presumptive negative   08/14/20  Unknown


 


Urine Methadone Screen  Presumptive negative   08/14/20  Unknown


 


Acetaminophen  5.0 ug/mL (10.0-30.0)  L  08/13/20  22:44    


 


Ur Barbiturates Screen  Presumptive negative   08/14/20  Unknown


 


Valproic Acid  31.8 ug/mL ()  L  08/17/20  08:46    


 


Ur Phencyclidine Scrn  Presumptive negative   08/14/20  Unknown


 


Ur Amphetamines Screen  Presumptive negative   08/14/20  Unknown


 


U Benzodiazepines Scrn  Presumptive negative   08/14/20  Unknown


 


Urine Cocaine Screen  Presumptive negative   08/14/20  Unknown


 


U Marijuana (THC) Screen  Presumptive negative   08/14/20  Unknown


 


Drugs of Abuse Note  Disclamer   08/14/20  Unknown


 


Plasma/Serum Alcohol  < 0.01 % (0-0.07)   08/13/20  22:44    


 


Coronavirus (PCR)  Positive  (Negative)  A  08/15/20  Unknown











Schmidt/IV: 


                                        





Voiding Method                   Diaper


IV Catheter Type [Right Upper    INT / Saline Lock


arm]                             


IV Catheter Type [Right          INT / Saline Lock


Antecubital]                     


IV Catheter Type [Right Foot]    INT / Saline Lock


IV Catheter Type [Right          Peripheral IV


External Jugular]                











Active Medications





- Current Medications


Current Medications: 














Generic Name Dose Route Start Last Admin





  Trade Name Freq  PRN Reason Stop Dose Admin


 


Acetaminophen  650 mg  08/14/20 08:00  08/17/20 05:30





  Tylenol  PO   650 mg





  Q4H PRN   Administration





  Pain MILD(1-3)/Fever >100.5/HA  


 


Lipase/Protease/Amylase  1 each  08/17/20 10:34 





  Pancremarilin Clay 10,500 Unit  FEEDTUBE  





  PRN PRN  





  For Clogged Feeding Tube  


 


Enoxaparin Sodium  40 mg  08/17/20 22:00  08/20/20 11:39





  Enoxaparin  SUB-Q   40 mg





  BID KATHYA   Administration


 


Haloperidol  5 mg  08/15/20 12:00  08/20/20 11:39





  Haldol  PO   5 mg





  BID KATHYA   Administration


 


Hydralazine HCl  10 mg  08/19/20 00:38  08/20/20 00:09





  Apresoline  IV   10 mg





  Q6HR PRN   Administration





  Hypertension  


 


Sodium Chloride  1,000 mls @ 75 mls/hr  08/19/20 12:00  08/20/20 04:29





  Nacl 0.45% 1000 Ml  IV   75 mls/hr





  AS DIRECT KATHYA   Administration


 


Insulin Human Lispro  0 unit  08/19/20 01:00  08/20/20 14:53





  Humalog  SUB-Q   4 unit





  Q6HR KATHYA   Administration





  Protocol  


 


Methylprednisolone Sodium Succinate  40 mg  08/20/20 10:00  08/20/20 11:39





  Solu-Medrol  IV   40 mg





  Q24HR KATHYA   Administration


 


Miscellaneous Medication  325 mg  08/15/20 18:45 





  Paliperidone Palmitate [Invega Sustenna]  IM  





  Q3W KATHYA  


 


Ondansetron HCl  4 mg  08/14/20 08:00 





  Zofran  IV  





  Q8H PRN  





  Nausea And Vomiting  


 


Simple Syrup  15 ml  08/17/20 10:34 





  Simple Syrup  FEEDTUBE  





  PRN PRN  





  Hypoglycemia  


 


Simple Syrup  30 ml  08/17/20 10:34 





  Simple Syrup  FEEDTUBE  





  PRN PRN  





  Hypoglycemia  


 


Sodium Bicarbonate  325 mg  08/17/20 10:34 





  Sodium Bicarbonate  FEEDTUBE  





  PRN PRN  





  For Clogged Feeding Tube  


 


Sodium Chloride  10 ml  08/14/20 10:00  08/20/20 11:39





  Sodium Chloride Flush Syringe 10 Ml  IV   10 ml





  BID KATHYA   Administration


 


Sodium Chloride  10 ml  08/14/20 07:50 





  Sodium Chloride Flush Syringe 10 Ml  IV  08/27/20 07:49 





  PRN PRN  





  LINE FLUSH  


 


Trazodone HCl  50 mg  08/14/20 22:00  08/20/20 00:28





  Desyrel  PO   50 mg





  QHS KATHYA   Administration


 


Valproic Acid  1,500 mg  08/18/20 22:00  08/20/20 00:11





  Depakene Liq  PO   1,500 mg





  QHS KATHYA   Administration














Nutrition/Malnutrition Assess





- Dietary Evaluation


Nutrition/Malnutrition Findings: 


                                        





Nutrition Notes                                            Start:  08/17/20 09

:33


Freq:                                                      Status: Active       




Protocol:                                                                       




 Document     08/19/20 14:52  LM  (Rec: 08/19/20 14:56  LM  [id number])


 Nutrition Notes


     Initial or Follow up                        Reassessment


     Current Diagnosis                           Acute Kidney Injury,Sepsis


     Other Pertinent Diagnosis                   COVID-19 (+), Schizoaffective


                                                 disorder


     Current Diet                                Jevity 1.2 at 55ml/hr


     Labs/Tests                                  Na 149


                                                 BUN 19


                                                 


     Pertinent Medications                       Solumedrol


                                                 Humalog


     Height                                      5 ft 6 in


     Weight                                      114 kg


     Ideal Body Weight (kg)                      59.09


     BMI                                         40.5


     Weight Status                               Morbidly Obese


     Subjective/Other Information                TF running at goal rate and pt


                                                 is tolerating. SLP recommends


                                                 pt to continue with TF.


     Burn                                        Absent


     Trauma                                      Absent


     GI Symptoms                                 None


     Difficulty In                               Swallowing


     Food Allergy                                No


     Current % PO                                Negligible


     Minimum of two criteria                     No


     Reduced  Strength                       Measurably Reduced (severe)


     #1


      Nutrition Diagnosis                        Inadequate oral intake


      Diagnosis Progress(for reassessment        Continues


       documentation)                            


     Is patient on ventilator?                   No


     Is Patient Ambulatory and/or Out of Bed     No


     REE-([facility]-confined to bed)      2106.012


     Kcal/Kg value to use for calculation        14


     Approximate Energy Requirements Using       1596


      kcal/Kg                                    


     Calculation Used for Recommendations        Kcal/kg


     Additional Notes                            Protein needs are 69-87g (0.8-


                                                 1g/kg adjusted wt 86.5kg)


                                                 Fluid needs are 1ml/kcal


 Nutrition Intervention


     Change Diet Order:                          TF


     Nutrition Support:                          Jevity 1.2 at 55ml/hr


                                                 Flush with 200ml q4h for


                                                 hypernatremia


     Kcal                                        1,584


     Protein (gm)                                73


     Fluid (mL)                                  1,065


     Goal #1                                     Meet at least 80% of kcal and


                                                 protein needs via TF


     Anticipated Discharge Needs:                Unable to determine at this


                                                 time


     Follow-Up By:                               08/21/20


     Additional Comments                         F/U for TF tolerance, BG, Na

## 2020-08-19 NOTE — PROGRESS NOTE
Assessment and Plan





Cultures:


Blood culture and urine culture negative


COVID PCR positive


 


Assessment: 55 years old female with history of paranoid schizophrenia, admitted

on 8/13/2020 due to altered mental status and catatonia state:





#Severe sepsis: Present on admission with low-grade fever, tachycardia, elevated

leukocytosis, WHITNEY, likely due to bilateral pneumonia. 





#Bilateral pneumonia: COVID-19 pneumonia.  Inflammatory markers are elevated, 

with a d-dimer > 10,000.  CT negative for pulmonary embolism. Repeat d-dimer 

with much improvement. Renal function and LFTs improving, but given mild hypoxia

and several days of positivity, unclear benefit with Remdesivir at this stage.





#Acute hypoxemic respiratory failure: mild. on supplemental oxygen.





#Elevated LFTs: from COVID.





#WHITNEY: from COVID. improved





#Elevated LFTs: Very high likely secondary to rhabdomyolysis





#Elevated CK: Likely secondary to rhabdomyolysis possible due to COVID-19 

infection or catatonic state





#Paranoid schizophrenia with catatonia: Per psych





Recommendations:


-continue steroids, day 5 of 10


-completes ceftriaxone and azithromycin today


-Continue anticoagulation per protocol based on d-dimer








Blank Coppola MD, FACP


 [facility] ([facility])


C: [phone]


O: [phone]


F: [phone]





Subjective


Date of service: 08/19/20


Interval history: 


No fever.  Oxygen requirements stable at 2 L nasal cannula.





Objective





- Exam


Narrative Exam: 





Physical Exam (reviewed in chart due to PPE conservation)


Constitutional: limited due to PPE conservation strategy


Head, Ears, Nose: limited due to PPE conservation strategy


Eyes: limited due to PPE conservation strategy


Neck: limited due to PPE conservation strategy


Oral: limited due to PPE conservation strategy


Cardiovascular: limited due to PPE conservation strategy


Respiratory: limited due to PPE conservation strategy


GI: limited due to PPE conservation strategy


Musculoskeletal: limited due to PPE conservation strategy


Skin: limited due to PPE conservation strategy


Hem/Lymphatic: limited due to PPE conservation strategy


Psych: limited due to PPE conservation strategy


Neurological: limited due to PPE conservation strategy  





- Constitutional


Vitals: 


                                   Vital Signs











Temp Pulse Resp BP Pulse Ox


 


 97.3 F L  70   22   118/69   97 


 


 08/19/20 06:23  08/19/20 06:10  08/19/20 06:23  08/19/20 06:09  08/19/20 08:53








                           Temperature -Last 24 Hours











Temperature                    97.3 F


 


Temperature                    97.6 F


 


Temperature                    97.4 F

















- Labs


CBC & Chem 7: 


                                 08/19/20 09:26





                                 08/19/20 05:17


Labs: 


                              Abnormal lab results











  08/19/20 08/19/20 08/19/20 Range/Units





  00:20 05:17 06:22 


 


WBC     (4.5-11.0)  K/mm3


 


RDW     (13.2-15.2)  %


 


Sodium   149 H   (137-145)  mmol/L


 


Chloride   110.6 H   ()  mmol/L


 


BUN   19 H   (7-17)  mg/dL


 


Glucose   356 H   ()  mg/dL


 


POC Glucose  307 H   312 H  ()  


 


Calcium   8.1 L   (8.4-10.2)  mg/dL














  08/19/20 08/19/20 Range/Units





  09:26 13:12 


 


WBC  16.2 H   (4.5-11.0)  K/mm3


 


RDW  16.1 H   (13.2-15.2)  %


 


Sodium    (137-145)  mmol/L


 


Chloride    ()  mmol/L


 


BUN    (7-17)  mg/dL


 


Glucose    ()  mg/dL


 


POC Glucose   327 H  ()  


 


Calcium    (8.4-10.2)  mg/dL

## 2020-08-20 LAB
APTT BLD: 29.5 SEC. (ref 24.2–36.6)
BUN SERPL-MCNC: 16 MG/DL (ref 7–17)
BUN/CREAT SERPL: 20 %
CALCIUM SERPL-MCNC: 8.1 MG/DL (ref 8.4–10.2)
CRP SERPL-MCNC: 1.7 MG/DL (ref 0–1.3)
HCT VFR BLD CALC: 39.5 % (ref 30.3–42.9)
HEMOLYSIS INDEX: 10
HGB BLD-MCNC: 13 GM/DL (ref 10.1–14.3)
INR PPP: 1.15 (ref 0.87–1.13)
MCHC RBC AUTO-ENTMCNC: 33 % (ref 30–34)
MCV RBC AUTO: 88 FL (ref 79–97)
PLATELET # BLD: 213 K/MM3 (ref 140–440)
RBC # BLD AUTO: 4.47 M/MM3 (ref 3.65–5.03)

## 2020-08-20 RX ADMIN — HALOPERIDOL SCH: 5 TABLET ORAL at 21:27

## 2020-08-20 RX ADMIN — Medication SCH ML: at 11:39

## 2020-08-20 RX ADMIN — VALPROIC ACID SCH MG: 250 SOLUTION ORAL at 21:26

## 2020-08-20 RX ADMIN — Medication SCH ML: at 02:26

## 2020-08-20 RX ADMIN — HALOPERIDOL SCH MG: 5 TABLET ORAL at 00:11

## 2020-08-20 RX ADMIN — INSULIN LISPRO SCH UNIT: 100 INJECTION, SOLUTION INTRAVENOUS; SUBCUTANEOUS at 00:31

## 2020-08-20 RX ADMIN — INSULIN LISPRO SCH UNIT: 100 INJECTION, SOLUTION INTRAVENOUS; SUBCUTANEOUS at 14:53

## 2020-08-20 RX ADMIN — METHYLPREDNISOLONE SODIUM SUCCINATE SCH MG: 40 INJECTION, POWDER, FOR SOLUTION INTRAMUSCULAR; INTRAVENOUS at 11:39

## 2020-08-20 RX ADMIN — HALOPERIDOL SCH MG: 5 TABLET ORAL at 11:39

## 2020-08-20 RX ADMIN — VALPROIC ACID SCH MG: 250 SOLUTION ORAL at 00:11

## 2020-08-20 RX ADMIN — INSULIN LISPRO SCH UNIT: 100 INJECTION, SOLUTION INTRAVENOUS; SUBCUTANEOUS at 07:47

## 2020-08-20 RX ADMIN — SODIUM CHLORIDE SCH MLS/HR: 0.45 INJECTION, SOLUTION INTRAVENOUS at 21:25

## 2020-08-20 RX ADMIN — SODIUM CHLORIDE SCH MLS/HR: 0.45 INJECTION, SOLUTION INTRAVENOUS at 04:29

## 2020-08-20 RX ADMIN — Medication SCH ML: at 21:27

## 2020-08-20 RX ADMIN — INSULIN LISPRO SCH UNIT: 100 INJECTION, SOLUTION INTRAVENOUS; SUBCUTANEOUS at 17:31

## 2020-08-20 RX ADMIN — ENOXAPARIN SODIUM SCH MG: 100 INJECTION SUBCUTANEOUS at 11:39

## 2020-08-20 RX ADMIN — HYDRALAZINE HYDROCHLORIDE PRN MG: 20 INJECTION INTRAMUSCULAR; INTRAVENOUS at 00:09

## 2020-08-20 RX ADMIN — ENOXAPARIN SODIUM SCH MG: 100 INJECTION SUBCUTANEOUS at 02:26

## 2020-08-20 RX ADMIN — ENOXAPARIN SODIUM SCH MG: 100 INJECTION SUBCUTANEOUS at 21:26

## 2020-08-20 NOTE — PROGRESS NOTE
<PADMA AMES LAURENMarco A - Last Filed: 08/20/20 14:22>





Assessment and Plan





- Patient Problems


(1) Severe sepsis


Current Visit: Yes   Status: Acute   


Plan to address problem: 


- Presented with low-grade fever, tachycardia, leukocytosis, WHITNEY, and bilateral 

pneumonia


- Likely source is bilateral pneumonia secondary to COVID-19


- IV antibiotics completed


- Covid protocol initiated











(2) Pneumonia due to COVID-19 virus


Current Visit: Yes   Status: Acute   


Plan to address problem: 


- COVID protocol initiated


- IV azithromycin and Rocephin completed


- 8/14 COVID PCR positive


- Trend LDH, CRP, procalcitonin, d-dimer, ferritin 


- Droplet /contact precautions


- Supplemental oxygenation as needed


- Infectious disease consult requested


- Pulmonary hygiene


- In setting of elevated D-Dimer, CTA Chest was ordered which was negative for 

pulmonary embolism


- Anticoagulation per COVID protocol


- Cannot initiate Remdesivir in setting on of elevated LFTs


- Continue p.o. dexamethasone








(3) Neuroleptic malignant syndrome


Current Visit: Yes   Status: Ruled-out   


Plan to address problem: 


- Given catatonic state, elevated CK, AMS and antipsychotic medication use we 

will r/o malignant neuroleptic syndrome


- Neurology has been consulted; does not suspect NMS at this time


- 8/16 CTH shows mild microvascular angiopathy and cerebral atrophy without 

evidence of acute intracranial hemorrhage. 








(4) Rhabdomyolysis


Current Visit: Yes   Status: Resolved   


Plan to address problem: 


- [place] [address], down to 2599


-Initiated on normal saline at 125ml/hr then switched to half-normal saline at 

125ml/hr for hypernatremia however it has not been stopped








(5) Acute psychosis


Current Visit: Yes   Status: Acute   


Plan to address problem: 


- Currently withdraws to pain however moving all extremities spontaneously


- History of paranoid schizophrenia


- Mental health consult requested and does not recommend inpatient psych at this

time as medical needs outweigh psych this time


- Requested to reconsult when patient is medically stable


- GI consulted for PEG tube placement to facilitate placement








(6) Paranoid schizophrenia


Current Visit: Yes   Status: Chronic   


Plan to address problem: 


-History of present schizophrenia


-Psych consulted and appreciate recommendations


-Depakote and Haldol 


-Sleep hygiene


-Reorientation as needed








(7) Acute renal failure


Current Visit: Yes   Status: Resolved   


Plan to address problem: 


- Vasomotor nephropathy maybe secondary to dehydration and rhabdomyolysis 


- Received 2 L IVF in ED


- Admit Cr/BUN 1.4/33 but now trended down to 0.8-0.9


- Avoid nephrotoxic medications


- Trend BMP








(8) Leukocytosis


Current Visit: Yes   Status: Acute   


Plan to address problem: 


- Admit WBC 13.8


- Trend CBC


- Maybe secondary to possible PNA or from dehydration


- IV abx completed


- Also on steroids for COVID pneumonia








(9) Thrombocytopenia


Current Visit: Yes   Status: Resolved   


Plan to address problem: 


- Admit Plt is 97 now up to 140K


- Bleeding precautions


- Trend CBC








(10) DVT prophylaxis


Current Visit: Yes   Status: Acute   


Plan to address problem: 


- SCDs to BLE while in bed


- Lovenox subq twice daily per COVID protocol








History


Interval history: 





This is a 55-year-old female with paranoid schizophrenia that presents to the 

emergency department on 8/13 for altered mental status in a catatonic state.  

Previous hospitalizations reviewed and only notes a history of paranoid 

schizophrenia. Patient may be a resident of [place].  Patient is currently 

nonverbal and HPI is received from ER documentation.  Work-up in the emergency 

department included a CT abdomen pelvis which shows patchy peripheral groundgl

ass opacities bilateral lungs which is consistent with either viral or atypical 

pneumonia, positive anterior lateral fat hernia in the left mid abdomen and a 

large uterine fibroids however her CXR showed no acute pulmonary or pleural 

abnormalities.  She was found to have acute kidney injury with a creatinine of 

1.4/BUN 33 as baseline from previous records seems to be 0.7, rhabdomyolysis 

with a creatinine kinase of 33,481, leukocytosis with a WBC of 13.8, 

thrombocytopenia with platelets at 97.  She has COVID pneumonia with IV 

antibiotics, rhabdomyolysis, and WHITNEY. Infectious disease, psych, and neurology 

have been consulted. Neurology does not suspect NMS at this time. Psych has 

signed off at this time due to ongoing acute medical processes.  PT/ST consulted

for evaluation but unable to be performed as patient will not follow verbal 

commands.  She still moves and groans to painful stimuli however she does not 

open her eyes to verbal or physical stimuli.  GI was consulted today for PEG pl

acement 





* Discussed with primary caretaker patient was normally verbal but sometimes 

  goes into a catatonic state.  Phone number for this is [phone]


* 8/18: PT consulted.  Psych signed off.  Continue treatment


* 8/17: continue treatment


* 8/16: Change fluids to NS 1/2 AT 125CC/hr. Rhabdomylysis improving, insert NGT

  and start tube feeds. Psych input noted. Continue management for COVID 19. ID 

  consulted. No new fever.  CTA and CTH obtained which were both negative


* 8/15: COVID PCR Positive








Hospitalist Physical





- Constitutional


Vitals: 


                                        











Temp Pulse Resp BP Pulse Ox


 


 97.9 F   100 H  24   158/107   100 


 


 08/20/20 11:59  08/20/20 11:59  08/20/20 11:59  08/20/20 11:59  08/20/20 11:59











General appearance: Present: no acute distress, other (Patient slightly opens 

her eyes to tactile stimuli and withdraws to painful stimuli in all 4 

extremities.  She is seen MCFADDEN spontaneously.)





- EENT


Eyes: Absent: scleral icterus





- Neck


Neck: Present: normal ROM





- Respiratory


Respiratory effort: normal


Respiratory: bilateral: CTA





- Cardiovascular


Rhythm: regular


Heart Sounds: Present: S1 & S2.  Absent: systolic murmur, diastolic murmur





- Extremities


Extremities: no ischemia, pulses intact, pulses symmetrical, No edema, normal 

temperature, normal color, Full ROM


Peripheral Pulses: within normal limits





- Abdominal


General gastrointestinal: soft, non-tender, non-distended, normal bowel sounds





- Integumentary


Integumentary: Present: clear, warm, dry





- Psychiatric


Psychiatric: other (Moves all extremities spontaneously and to painful stimuli 

and groans to painful stim)





- Neurologic


Neurologic: no focal deficits, moves all extremities





- Allied Health


Allied health notes reviewed: nursing, social work, case management





Results





- Labs


CBC & Chem 7: 


                                 08/20/20 06:14





                                 08/20/20 06:14


Labs: 


                             Laboratory Last Values











WBC  18.4 K/mm3 (4.5-11.0)  H  08/20/20  06:14    


 


RBC  4.47 M/mm3 (3.65-5.03)   08/20/20  06:14    


 


Hgb  13.0 gm/dl (10.1-14.3)   08/20/20  06:14    


 


Hct  39.5 % (30.3-42.9)   08/20/20  06:14    


 


MCV  88 fl (79-97)   08/20/20  06:14    


 


MCH  29 pg (28-32)   08/20/20  06:14    


 


MCHC  33 % (30-34)   08/20/20  06:14    


 


RDW  15.8 % (13.2-15.2)  H  08/20/20  06:14    


 


Plt Count  213 K/mm3 (140-440)   08/20/20  06:14    


 


Lymph % (Auto)  10.6 % (13.4-35.0)  L  08/13/20  22:44    


 


Mono % (Auto)  9.8 % (0.0-7.3)  H  08/13/20  22:44    


 


Eos % (Auto)  0.0 % (0.0-4.3)   08/13/20  22:44    


 


Baso % (Auto)  1.2 % (0.0-1.8)   08/13/20  22:44    


 


Lymph #  1.5 K/mm3 (1.2-5.4)   08/13/20  22:44    


 


Mono #  1.3 K/mm3 (0.0-0.8)  H  08/13/20  22:44    


 


Eos #  0.0 K/mm3 (0.0-0.4)   08/13/20  22:44    


 


Baso #  0.2 K/mm3 (0.0-0.1)  H  08/13/20  22:44    


 


Add Manual Diff  Complete   08/15/20  05:08    


 


Total Counted  100   08/15/20  05:08    


 


Seg Neutrophils %  78.4 % (40.0-70.0)  H  08/13/20  22:44    


 


Seg Neuts % (Manual)  72.0 % (40.0-70.0)  H  08/15/20  05:08    


 


Band Neutrophils %  8.0 %  08/15/20  05:08    


 


Lymphocytes % (Manual)  13.0 % (13.4-35.0)  L  08/15/20  05:08    


 


Reactive Lymphs % (Man)  0 %  08/15/20  05:08    


 


Monocytes % (Manual)  7.0 % (0.0-7.3)   08/15/20  05:08    


 


Eosinophils % (Manual)  0 % (0.0-4.3)   08/15/20  05:08    


 


Basophils % (Manual)  0 % (0.0-1.8)   08/15/20  05:08    


 


Metamyelocytes %  0 %  08/15/20  05:08    


 


Myelocytes %  0 %  08/15/20  05:08    


 


Promyelocytes %  0 %  08/15/20  05:08    


 


Blast Cells %  0 %  08/15/20  05:08    


 


Nucleated RBC %  Not Reportable   08/15/20  05:08    


 


Seg Neutrophils #  10.8 K/mm3 (1.8-7.7)  H  08/13/20  22:44    


 


Seg Neutrophils # Man  7.5 K/mm3 (1.8-7.7)   08/15/20  05:08    


 


Band Neutrophils #  0.8 K/mm3  08/15/20  05:08    


 


Lymphocytes # (Manual)  1.4 K/mm3 (1.2-5.4)   08/15/20  05:08    


 


Abs React Lymphs (Man)  0.0 K/mm3  08/15/20  05:08    


 


Monocytes # (Manual)  0.7 K/mm3 (0.0-0.8)   08/15/20  05:08    


 


Eosinophils # (Manual)  0.0 K/mm3 (0.0-0.4)   08/15/20  05:08    


 


Basophils # (Manual)  0.0 K/mm3 (0.0-0.1)   08/15/20  05:08    


 


Metamyelocytes #  0.0 K/mm3  08/15/20  05:08    


 


Myelocytes #  0.0 K/mm3  08/15/20  05:08    


 


Promyelocytes #  0.0 K/mm3  08/15/20  05:08    


 


Blast Cells #  0.0 K/mm3  08/15/20  05:08    


 


WBC Morphology  Not Reportable   08/15/20  05:08    


 


Hypersegmented Neuts  Not Reportable   08/15/20  05:08    


 


Hyposegmented Neuts  Not Reportable   08/15/20  05:08    


 


Hypogranular Neuts  Not Reportable   08/15/20  05:08    


 


Smudge Cells  Not Reportable   08/15/20  05:08    


 


Toxic Granulation  Not Reportable   08/15/20  05:08    


 


Toxic Vacuolation  Not Reportable   08/15/20  05:08    


 


Dohle Bodies  Not Reportable   08/15/20  05:08    


 


Pelger-Huet Anomaly  Not Reportable   08/15/20  05:08    


 


Arnie Rods  Not Reportable   08/15/20  05:08    


 


Platelet Estimate  Consistent w auto   08/15/20  05:08    


 


Clumped Platelets  Not Reportable   08/15/20  05:08    


 


Plt Clumps, EDTA  Not Reportable   08/15/20  05:08    


 


Large Platelets  Not Reportable   08/15/20  05:08    


 


Giant Platelets  Not Reportable   08/15/20  05:08    


 


Platelet Satelliting  Not Reportable   08/15/20  05:08    


 


Plt Morphology Comment  Not Reportable   08/15/20  05:08    


 


RBC Morphology  Not Reportable   08/15/20  05:08    


 


Dimorphic RBCs  Not Reportable   08/15/20  05:08    


 


Polychromasia  Not Reportable   08/15/20  05:08    


 


Hypochromasia  Not Reportable   08/15/20  05:08    


 


Poikilocytosis  Not Reportable   08/15/20  05:08    


 


Anisocytosis  1+   08/15/20  05:08    


 


Microcytosis  Not Reportable   08/15/20  05:08    


 


Macrocytosis  Not Reportable   08/15/20  05:08    


 


Spherocytes  Not Reportable   08/15/20  05:08    


 


Pappenheimer Bodies  Not Reportable   08/15/20  05:08    


 


Sickle Cells  Not Reportable   08/15/20  05:08    


 


Target Cells  Not Reportable   08/15/20  05:08    


 


Tear Drop Cells  Not Reportable   08/15/20  05:08    


 


Ovalocytes  Not Reportable   08/15/20  05:08    


 


Helmet Cells  Not Reportable   08/15/20  05:08    


 


Mancera-Ivanhoe Bodies  Not Reportable   08/15/20  05:08    


 


Cabot Rings  Not Reportable   08/15/20  05:08    


 


Jd Cells  Not Reportable   08/15/20  05:08    


 


Bite Cells  Not Reportable   08/15/20  05:08    


 


Crenated Cell  Not Reportable   08/15/20  05:08    


 


Elliptocytes  Not Reportable   08/15/20  05:08    


 


Acanthocytes (Spur)  Not Reportable   08/15/20  05:08    


 


Rouleaux  Not Reportable   08/15/20  05:08    


 


Hemoglobin C Crystals  Not Reportable   08/15/20  05:08    


 


Schistocytes  Not Reportable   08/15/20  05:08    


 


Malaria parasites  Not Reportable   08/15/20  05:08    


 


D[place]ota Bodies  Not Reportable   08/15/20  05:08    


 


Hem Pathologist Commnt  No   08/15/20  05:08    


 


D-Dimer  674.88 ng/mlDDU (0-234)  H  08/20/20  06:14    


 


Sodium  148 mmol/L (137-145)  H  08/20/20  06:14    


 


Potassium  3.7 mmol/L (3.6-5.0)   08/20/20  06:14    


 


Chloride  109.4 mmol/L ()  H  08/20/20  06:14    


 


Carbon Dioxide  26 mmol/L (22-30)   08/20/20  06:14    


 


Anion Gap  16 mmol/L  08/20/20  06:14    


 


BUN  16 mg/dL (7-17)   08/20/20  06:14    


 


Creatinine  0.8 mg/dL (0.6-1.2)   08/20/20  06:14    


 


Estimated GFR  > 60 ml/min  08/20/20  06:14    


 


BUN/Creatinine Ratio  20 %  08/20/20  06:14    


 


Glucose  275 mg/dL ()  H  08/20/20  06:14    


 


POC Glucose  282  ()  H  08/20/20  12:08    


 


Lactic Acid  1.30 mmol/L (0.7-2.0)   08/13/20  22:44    


 


Calcium  8.1 mg/dL (8.4-10.2)  L  08/20/20  06:14    


 


Ferritin  1445.0 ng/mL (10.0-200.0)  H  08/20/20  06:14    


 


Total Bilirubin  0.40 mg/dL (0.1-1.2)   08/18/20  08:32    


 


Direct Bilirubin  0.3 mg/dL (0-0.2)  H  08/13/20  22:44    


 


Indirect Bilirubin  0.2 mg/dL  08/13/20  22:44    


 


AST  149 units/L (5-40)  H  08/18/20  08:32    


 


ALT  96 units/L (7-56)  H  08/18/20  08:32    


 


Alkaline Phosphatase  40 units/L ()   08/18/20  08:32    


 


Lactate Dehydrogenase  812 units/L ()  H  08/20/20  06:14    


 


Total Creatine Kinase  934 units/L ()  H  08/18/20  08:32    


 


C-Reactive Protein  1.70 mg/dL (0.00-1.30)  H  08/20/20  06:14    


 


Total Protein  6.1 g/dL (6.3-8.2)  L  08/18/20  08:32    


 


Albumin  2.5 g/dL (3.9-5)  L  08/18/20  08:32    


 


Albumin/Globulin Ratio  0.7 %  08/18/20  08:32    


 


Procalcitonin  0.71 ng/mL (<0.15)   08/18/20  08:32    


 


Urine Color  Tiffany  (Yellow)   08/14/20  Unknown


 


Urine Turbidity  Slightly-cloudy  (Clear)   08/14/20  Unknown


 


Urine pH  5.0  (5.0-7.0)   08/14/20  Unknown


 


Ur Specific Gravity  1.026  (1.003-1.030)   08/14/20  Unknown


 


Urine Protein  100 mg/dl mg/dL (Negative)   08/14/20  Unknown


 


Urine Glucose (UA)  Neg mg/dL (Negative)   08/14/20  Unknown


 


Urine Ketones  Tr mg/dL (Negative)   08/14/20  Unknown


 


Urine Blood  Lg  (Negative)   08/14/20  Unknown


 


Urine Nitrite  Neg  (Negative)   08/14/20  Unknown


 


Urine Bilirubin  Neg  (Negative)   08/14/20  Unknown


 


Urine Urobilinogen  2.0 mg/dL (<2.0)   08/14/20  Unknown


 


Ur Leukocyte Esterase  Neg  (Negative)   08/14/20  Unknown


 


Urine WBC (Auto)  17.0 /HPF (0.0-6.0)  H  08/14/20  Unknown


 


Urine RBC (Auto)  1.0 /HPF (0.0-6.0)   08/14/20  Unknown


 


U Epithel Cells (Auto)  4.0 /HPF (0-13.0)   08/14/20  Unknown


 


Urine Mucus  2+ /HPF  08/14/20  Unknown


 


Salicylates  < 0.3 mg/dL (2.8-20.0)  L  08/13/20  22:44    


 


Urine Opiates Screen  Presumptive negative   08/14/20  Unknown


 


Urine Methadone Screen  Presumptive negative   08/14/20  Unknown


 


Acetaminophen  5.0 ug/mL (10.0-30.0)  L  08/13/20  22:44    


 


Ur Barbiturates Screen  Presumptive negative   08/14/20  Unknown


 


Valproic Acid  31.8 ug/mL ()  L  08/17/20  08:46    


 


Ur Phencyclidine Scrn  Presumptive negative   08/14/20  Unknown


 


Ur Amphetamines Screen  Presumptive negative   08/14/20  Unknown


 


U Benzodiazepines Scrn  Presumptive negative   08/14/20  Unknown


 


Urine Cocaine Screen  Presumptive negative   08/14/20  Unknown


 


U Marijuana (THC) Screen  Presumptive negative   08/14/20  Unknown


 


Drugs of Abuse Note  Disclamer   08/14/20  Unknown


 


Plasma/Serum Alcohol  < 0.01 % (0-0.07)   08/13/20  22:44    


 


Coronavirus (PCR)  Positive  (Negative)  A  08/15/20  Unknown











Schmidt/IV: 


                                        





Voiding Method                   Diaper


IV Catheter Type [Right Upper    INT / Saline Lock


arm]                             


IV Catheter Type [Right          INT / Saline Lock


Antecubital]                     


IV Catheter Type [Right Foot]    INT / Saline Lock


IV Catheter Type [Right          Peripheral IV


External Jugular]                











Active Medications





- Current Medications


Current Medications: 














Generic Name Dose Route Start Last Admin





  Trade Name Freq  PRN Reason Stop Dose Admin


 


Acetaminophen  650 mg  08/14/20 08:00  08/17/20 05:30





  Tylenol  PO   650 mg





  Q4H PRN   Administration





  Pain MILD(1-3)/Fever >100.5/HA  


 


Lipase/Protease/Amylase  1 each  08/17/20 10:34 





  Pancreaze Dr 10,500 Unit  FEEDTUBE  





  PRN PRN  





  For Clogged Feeding Tube  


 


Enoxaparin Sodium  40 mg  08/17/20 22:00  08/20/20 11:39





  Enoxaparin  SUB-Q   40 mg





  BID KATHYA   Administration


 


Haloperidol  5 mg  08/15/20 12:00  08/20/20 11:39





  Haldol  PO   5 mg





  BID KATHYA   Administration


 


Hydralazine HCl  10 mg  08/19/20 00:38  08/20/20 00:09





  Apresoline  IV   10 mg





  Q6HR PRN   Administration





  Hypertension  


 


Sodium Chloride  1,000 mls @ 75 mls/hr  08/19/20 12:00  08/20/20 04:29





  Nacl 0.45% 1000 Ml  IV   75 mls/hr





  AS DIRECT KATHYA   Administration


 


Insulin Human Lispro  0 unit  08/19/20 01:00  08/20/20 07:47





  Humalog  SUB-Q   3 unit





  Q6HR KATHYA   Administration





  Protocol  


 


Methylprednisolone Sodium Succinate  40 mg  08/20/20 10:00  08/20/20 11:39





  Solu-Medrol  IV   40 mg





  Q24HR KATHYA   Administration


 


Miscellaneous Medication  325 mg  08/15/20 18:45 





  Paliperidone Palmitate [Invega Sustenna]  IM  





  Q3W KATHYA  


 


Ondansetron HCl  4 mg  08/14/20 08:00 





  Zofran  IV  





  Q8H PRN  





  Nausea And Vomiting  


 


Simple Syrup  15 ml  08/17/20 10:34 





  Simple Syrup  FEEDTUBE  





  PRN PRN  





  Hypoglycemia  


 


Simple Syrup  30 ml  08/17/20 10:34 





  Simple Syrup  FEEDTUBE  





  PRN PRN  





  Hypoglycemia  


 


Sodium Bicarbonate  325 mg  08/17/20 10:34 





  Sodium Bicarbonate  FEEDTUBE  





  PRN PRN  





  For Clogged Feeding Tube  


 


Sodium Chloride  10 ml  08/14/20 10:00  08/20/20 11:39





  Sodium Chloride Flush Syringe 10 Ml  IV   10 ml





  BID KATHYA   Administration


 


Sodium Chloride  10 ml  08/14/20 07:50 





  Sodium Chloride Flush Syringe 10 Ml  IV  08/27/20 07:49 





  PRN PRN  





  LINE FLUSH  


 


Trazodone HCl  50 mg  08/14/20 22:00  08/20/20 00:28





  Desyrel  PO   50 mg





  QHS KATHYA   Administration


 


Valproic Acid  1,500 mg  08/18/20 22:00  08/20/20 00:11





  Depakene Liq  PO   1,500 mg





  QHS KATHYA   Administration














Nutrition/Malnutrition Assess





- Dietary Evaluation


Nutrition/Malnutrition Findings: 


                                        





Nutrition Notes                                            Start:  08/17/20 

09:33


Freq:                                                      Status: Active       




Protocol:                                                                       




 Document     08/19/20 14:52  LM  (Rec: 08/19/20 14:56  LM  [id number])


 Nutrition Notes


     Initial or Follow up                        Reassessment


     Current Diagnosis                           Acute Kidney Injury,Sepsis


     Other Pertinent Diagnosis                   COVID-19 (+), Schizoaffective


                                                 disorder


     Current Diet                                Jevity 1.2 at 55ml/hr


     Labs/Tests                                  Na 149


                                                 BUN 19


                                                 


     Pertinent Medications                       Solumedrol


                                                 Humalog


     Height                                      5 ft 6 in


     Weight                                      114 kg


     Ideal Body Weight (kg)                      59.09


     BMI                                         40.5


     Weight Status                               Morbidly Obese


     Subjective/Other Information                TF running at goal rate and pt


                                                 is tolerating. SLP recommends


                                                 pt to continue with TF.


     Burn                                        Absent


     Trauma                                      Absent


     GI Symptoms                                 None


     Difficulty In                               Swallowing


     Food Allergy                                No


     Current % PO                                Negligible


     Minimum of two criteria                     No


     Reduced  Strength                       Measurably Reduced (severe)


     #1


      Nutrition Diagnosis                        Inadequate oral intake


      Diagnosis Progress(for reassessment        Continues


       documentation)                            


     Is patient on ventilator?                   No


     Is Patient Ambulatory and/or Out of Bed     No


     REE-([facility]-confined to bed)      2106.012


     Kcal/Kg value to use for calculation        14


     Approximate Energy Requirements Using       1596


      kcal/Kg                                    


     Calculation Used for Recommendations        Kcal/kg


     Additional Notes                            Protein needs are 69-87g (0.8-


                                                 1g/kg adjusted wt 86.5kg)


                                                 Fluid needs are 1ml/kcal


 Nutrition Intervention


     Change Diet Order:                          TF


     Nutrition Support:                          Jevity 1.2 at 55ml/hr


                                                 Flush with 200ml q4h for


                                                 hypernatremia


     Kcal                                        1,584


     Protein (gm)                                73


     Fluid (mL)                                  1,065


     Goal #1                                     Meet at least 80% of kcal and


                                                 protein needs via TF


     Anticipated Discharge Needs:                Unable to determine at this


                                                 time


     Follow-Up By:                               08/21/20


     Additional Comments                         F/U for TF tolerance, BG, Na











<JING CERRATO R - Last Filed: 08/20/20 17:22>





Assessment and Plan


Assessment and plan: 





I saw and evaluated the patient. I agree with the findings and the plan of care 

as documented in the Nurse Practitioner's~note, with the following corrections 

and additions.





Consult GI for PEG tube placement











Hospitalist Physical





- Constitutional


Vitals: 


                                        











Temp Pulse Resp BP Pulse Ox


 


 97.9 F   100 H  24   158/107   100 


 


 08/20/20 11:59  08/20/20 11:59  08/20/20 11:59  08/20/20 11:59  08/20/20 11:59














Results





- Labs


CBC & Chem 7: 


                                 08/20/20 06:14





                                 08/20/20 06:14


Labs: 


                             Laboratory Last Values











WBC  18.4 K/mm3 (4.5-11.0)  H  08/20/20  06:14    


 


RBC  4.47 M/mm3 (3.65-5.03)   08/20/20  06:14    


 


Hgb  13.0 gm/dl (10.1-14.3)   08/20/20  06:14    


 


Hct  39.5 % (30.3-42.9)   08/20/20  06:14    


 


MCV  88 fl (79-97)   08/20/20  06:14    


 


MCH  29 pg (28-32)   08/20/20  06:14    


 


MCHC  33 % (30-34)   08/20/20  06:14    


 


RDW  15.8 % (13.2-15.2)  H  08/20/20  06:14    


 


Plt Count  213 K/mm3 (140-440)   08/20/20  06:14    


 


Lymph % (Auto)  10.6 % (13.4-35.0)  L  08/13/20  22:44    


 


Mono % (Auto)  9.8 % (0.0-7.3)  H  08/13/20  22:44    


 


Eos % (Auto)  0.0 % (0.0-4.3)   08/13/20  22:44    


 


Baso % (Auto)  1.2 % (0.0-1.8)   08/13/20  22:44    


 


Lymph #  1.5 K/mm3 (1.2-5.4)   08/13/20  22:44    


 


Mono #  1.3 K/mm3 (0.0-0.8)  H  08/13/20  22:44    


 


Eos #  0.0 K/mm3 (0.0-0.4)   08/13/20  22:44    


 


Baso #  0.2 K/mm3 (0.0-0.1)  H  08/13/20  22:44    


 


Add Manual Diff  Complete   08/15/20  05:08    


 


Total Counted  100   08/15/20  05:08    


 


Seg Neutrophils %  78.4 % (40.0-70.0)  H  08/13/20  22:44    


 


Seg Neuts % (Manual)  72.0 % (40.0-70.0)  H  08/15/20  05:08    


 


Band Neutrophils %  8.0 %  08/15/20  05:08    


 


Lymphocytes % (Manual)  13.0 % (13.4-35.0)  L  08/15/20  05:08    


 


Reactive Lymphs % (Man)  0 %  08/15/20  05:08    


 


Monocytes % (Manual)  7.0 % (0.0-7.3)   08/15/20  05:08    


 


Eosinophils % (Manual)  0 % (0.0-4.3)   08/15/20  05:08    


 


Basophils % (Manual)  0 % (0.0-1.8)   08/15/20  05:08    


 


Metamyelocytes %  0 %  08/15/20  05:08    


 


Myelocytes %  0 %  08/15/20  05:08    


 


Promyelocytes %  0 %  08/15/20  05:08    


 


Blast Cells %  0 %  08/15/20  05:08    


 


Nucleated RBC %  Not Reportable   08/15/20  05:08    


 


Seg Neutrophils #  10.8 K/mm3 (1.8-7.7)  H  08/13/20  22:44    


 


Seg Neutrophils # Man  7.5 K/mm3 (1.8-7.7)   08/15/20  05:08    


 


Band Neutrophils #  0.8 K/mm3  08/15/20  05:08    


 


Lymphocytes # (Manual)  1.4 K/mm3 (1.2-5.4)   08/15/20  05:08    


 


Abs React Lymphs (Man)  0.0 K/mm3  08/15/20  05:08    


 


Monocytes # (Manual)  0.7 K/mm3 (0.0-0.8)   08/15/20  05:08    


 


Eosinophils # (Manual)  0.0 K/mm3 (0.0-0.4)   08/15/20  05:08    


 


Basophils # (Manual)  0.0 K/mm3 (0.0-0.1)   08/15/20  05:08    


 


Metamyelocytes #  0.0 K/mm3  08/15/20  05:08    


 


Myelocytes #  0.0 K/mm3  08/15/20  05:08    


 


Promyelocytes #  0.0 K/mm3  08/15/20  05:08    


 


Blast Cells #  0.0 K/mm3  08/15/20  05:08    


 


WBC Morphology  Not Reportable   08/15/20  05:08    


 


Hypersegmented Neuts  Not Reportable   08/15/20  05:08    


 


Hyposegmented Neuts  Not Reportable   08/15/20  05:08    


 


Hypogranular Neuts  Not Reportable   08/15/20  05:08    


 


Smudge Cells  Not Reportable   08/15/20  05:08    


 


Toxic Granulation  Not Reportable   08/15/20  05:08    


 


Toxic Vacuolation  Not Reportable   08/15/20  05:08    


 


Dohle Bodies  Not Reportable   08/15/20  05:08    


 


Pelger-Huet Anomaly  Not Reportable   08/15/20  05:08    


 


Arnie Rods  Not Reportable   08/15/20  05:08    


 


Platelet Estimate  Consistent w auto   08/15/20  05:08    


 


Clumped Platelets  Not Reportable   08/15/20  05:08    


 


Plt Clumps, EDTA  Not Reportable   08/15/20  05:08    


 


Large Platelets  Not Reportable   08/15/20  05:08    


 


Giant Platelets  Not Reportable   08/15/20  05:08    


 


Platelet Satelliting  Not Reportable   08/15/20  05:08    


 


Plt Morphology Comment  Not Reportable   08/15/20  05:08    


 


RBC Morphology  Not Reportable   08/15/20  05:08    


 


Dimorphic RBCs  Not Reportable   08/15/20  05:08    


 


Polychromasia  Not Reportable   08/15/20  05:08    


 


Hypochromasia  Not Reportable   08/15/20  05:08    


 


Poikilocytosis  Not Reportable   08/15/20  05:08    


 


Anisocytosis  1+   08/15/20  05:08    


 


Microcytosis  Not Reportable   08/15/20  05:08    


 


Macrocytosis  Not Reportable   08/15/20  05:08    


 


Spherocytes  Not Reportable   08/15/20  05:08    


 


Pappenheimer Bodies  Not Reportable   08/15/20  05:08    


 


Sickle Cells  Not Reportable   08/15/20  05:08    


 


Target Cells  Not Reportable   08/15/20  05:08    


 


Tear Drop Cells  Not Reportable   08/15/20  05:08    


 


Ovalocytes  Not Reportable   08/15/20  05:08    


 


Helmet Cells  Not Reportable   08/15/20  05:08    


 


Mancera-Ivanhoe Bodies  Not Reportable   08/15/20  05:08    


 


Cabot Rings  Not Reportable   08/15/20  05:08    


 


Jd Cells  Not Reportable   08/15/20  05:08    


 


Bite Cells  Not Reportable   08/15/20  05:08    


 


Crenated Cell  Not Reportable   08/15/20  05:08    


 


Elliptocytes  Not Reportable   08/15/20  05:08    


 


Acanthocytes (Spur)  Not Reportable   08/15/20  05:08    


 


Rouleaux  Not Reportable   08/15/20  05:08    


 


Hemoglobin C Crystals  Not Reportable   08/15/20  05:08    


 


Schistocytes  Not Reportable   08/15/20  05:08    


 


Malaria parasites  Not Reportable   08/15/20  05:08    


 


Dakota Bodies  Not Reportable   08/15/20  05:08    


 


Hem Pathologist Commnt  No   08/15/20  05:08    


 


PT  14.9 Sec. (12.2-14.9)   08/20/20  15:23    


 


INR  1.15  (0.87-1.13)  H  08/20/20  15:23    


 


APTT  29.5 Sec. (24.2-36.6)   08/20/20  15:23    


 


D-Dimer  674.88 ng/mlDDU (0-234)  H  08/20/20  06:14    


 


Sodium  148 mmol/L (137-145)  H  08/20/20  06:14    


 


Potassium  3.7 mmol/L (3.6-5.0)   08/20/20  06:14    


 


Chloride  109.4 mmol/L ()  H  08/20/20  06:14    


 


Carbon Dioxide  26 mmol/L (22-30)   08/20/20  06:14    


 


Anion Gap  16 mmol/L  08/20/20  06:14    


 


BUN  16 mg/dL (7-17)   08/20/20  06:14    


 


Creatinine  0.8 mg/dL (0.6-1.2)   08/20/20  06:14    


 


Estimated GFR  > 60 ml/min  08/20/20  06:14    


 


BUN/Creatinine Ratio  20 %  08/20/20  06:14    


 


Glucose  275 mg/dL ()  H  08/20/20  06:14    


 


POC Glucose  282  ()  H  08/20/20  12:08    


 


Lactic Acid  1.30 mmol/L (0.7-2.0)   08/13/20  22:44    


 


Calcium  8.1 mg/dL (8.4-10.2)  L  08/20/20  06:14    


 


Ferritin  1445.0 ng/mL (10.0-200.0)  H  08/20/20  06:14    


 


Total Bilirubin  0.40 mg/dL (0.1-1.2)   08/18/20  08:32    


 


Direct Bilirubin  0.3 mg/dL (0-0.2)  H  08/13/20  22:44    


 


Indirect Bilirubin  0.2 mg/dL  08/13/20  22:44    


 


AST  149 units/L (5-40)  H  08/18/20  08:32    


 


ALT  96 units/L (7-56)  H  08/18/20  08:32    


 


Alkaline Phosphatase  40 units/L ()   08/18/20  08:32    


 


Lactate Dehydrogenase  812 units/L ()  H  08/20/20  06:14    


 


Total Creatine Kinase  934 units/L ()  H  08/18/20  08:32    


 


C-Reactive Protein  1.70 mg/dL (0.00-1.30)  H  08/20/20  06:14    


 


Total Protein  6.1 g/dL (6.3-8.2)  L  08/18/20  08:32    


 


Albumin  2.5 g/dL (3.9-5)  L  08/18/20  08:32    


 


Albumin/Globulin Ratio  0.7 %  08/18/20  08:32    


 


Procalcitonin  0.71 ng/mL (<0.15)   08/18/20  08:32    


 


Urine Color  Tiffany  (Yellow)   08/14/20  Unknown


 


Urine Turbidity  Slightly-cloudy  (Clear)   08/14/20  Unknown


 


Urine pH  5.0  (5.0-7.0)   08/14/20  Unknown


 


Ur Specific Gravity  1.026  (1.003-1.030)   08/14/20  Unknown


 


Urine Protein  100 mg/dl mg/dL (Negative)   08/14/20  Unknown


 


Urine Glucose (UA)  Neg mg/dL (Negative)   08/14/20  Unknown


 


Urine Ketones  Tr mg/dL (Negative)   08/14/20  Unknown


 


Urine Blood  Lg  (Negative)   08/14/20  Unknown


 


Urine Nitrite  Neg  (Negative)   08/14/20  Unknown


 


Urine Bilirubin  Neg  (Negative)   08/14/20  Unknown


 


Urine Urobilinogen  2.0 mg/dL (<2.0)   08/14/20  Unknown


 


Ur Leukocyte Esterase  Neg  (Negative)   08/14/20  Unknown


 


Urine WBC (Auto)  17.0 /HPF (0.0-6.0)  H  08/14/20  Unknown


 


Urine RBC (Auto)  1.0 /HPF (0.0-6.0)   08/14/20  Unknown


 


U Epithel Cells (Auto)  4.0 /HPF (0-13.0)   08/14/20  Unknown


 


Urine Mucus  2+ /HPF  08/14/20  Unknown


 


Salicylates  < 0.3 mg/dL (2.8-20.0)  L  08/13/20  22:44    


 


Urine Opiates Screen  Presumptive negative   08/14/20  Unknown


 


Urine Methadone Screen  Presumptive negative   08/14/20  Unknown


 


Acetaminophen  5.0 ug/mL (10.0-30.0)  L  08/13/20  22:44    


 


Ur Barbiturates Screen  Presumptive negative   08/14/20  Unknown


 


Valproic Acid  31.8 ug/mL ()  L  08/17/20  08:46    


 


Ur Phencyclidine Scrn  Presumptive negative   08/14/20  Unknown


 


Ur Amphetamines Screen  Presumptive negative   08/14/20  Unknown


 


U Benzodiazepines Scrn  Presumptive negative   08/14/20  Unknown


 


Urine Cocaine Screen  Presumptive negative   08/14/20  Unknown


 


U Marijuana (THC) Screen  Presumptive negative   08/14/20  Unknown


 


Drugs of Abuse Note  Disclamer   08/14/20  Unknown


 


Plasma/Serum Alcohol  < 0.01 % (0-0.07)   08/13/20  22:44    


 


Coronavirus (PCR)  Positive  (Negative)  A  08/15/20  Unknown











Schmidt/IV: 


                                        





Voiding Method                   Diaper


IV Catheter Type [Right Upper    INT / Saline Lock


arm]                             


IV Catheter Type [Right          INT / Saline Lock


Antecubital]                     


IV Catheter Type [Right Foot]    INT / Saline Lock


IV Catheter Type [Right          Peripheral IV


External Jugular]                











Active Medications





- Current Medications


Current Medications: 














Generic Name Dose Route Start Last Admin





  Trade Name Freq  PRN Reason Stop Dose Admin


 


Acetaminophen  650 mg  08/14/20 08:00  08/17/20 05:30





  Tylenol  PO   650 mg





  Q4H PRN   Administration





  Pain MILD(1-3)/Fever >100.5/HA  


 


Lipase/Protease/Amylase  1 each  08/17/20 10:34 





  Pancreaze Dr 10,500 Unit  FEEDTUBE  





  PRN PRN  





  For Clogged Feeding Tube  


 


Enoxaparin Sodium  40 mg  08/17/20 22:00  08/20/20 11:39





  Enoxaparin  SUB-Q   40 mg





  BID KATHYA   Administration


 


Haloperidol  5 mg  08/15/20 12:00  08/20/20 11:39





  Haldol  PO   5 mg





  BID KATHYA   Administration


 


Hydralazine HCl  10 mg  08/19/20 00:38  08/20/20 00:09





  Apresoline  IV   10 mg





  Q6HR PRN   Administration





  Hypertension  


 


Sodium Chloride  1,000 mls @ 75 mls/hr  08/19/20 12:00  08/20/20 04:29





  Nacl 0.45% 1000 Ml  IV   75 mls/hr





  AS DIRECT KATHYA   Administration


 


Insulin Human Lispro  0 unit  08/19/20 01:00  08/20/20 14:53





  Humalog  SUB-Q   4 unit





  Q6HR KATHYA   Administration





  Protocol  


 


Methylprednisolone Sodium Succinate  40 mg  08/20/20 10:00  08/20/20 11:39





  Solu-Medrol  IV   40 mg





  Q24HR KATHYA   Administration


 


Miscellaneous Medication  325 mg  08/15/20 18:45 





  Paliperidone Palmitate [Invega Sustenna]  IM  





  Q3W KATHYA  


 


Ondansetron HCl  4 mg  08/14/20 08:00 





  Zofran  IV  





  Q8H PRN  





  Nausea And Vomiting  


 


Simple Syrup  15 ml  08/17/20 10:34 





  Simple Syrup  FEEDTUBE  





  PRN PRN  





  Hypoglycemia  


 


Simple Syrup  30 ml  08/17/20 10:34 





  Simple Syrup  FEEDTUBE  





  PRN PRN  





  Hypoglycemia  


 


Sodium Bicarbonate  325 mg  08/17/20 10:34 





  Sodium Bicarbonate  FEEDTUBE  





  PRN PRN  





  For Clogged Feeding Tube  


 


Sodium Chloride  10 ml  08/14/20 10:00  08/20/20 11:39





  Sodium Chloride Flush Syringe 10 Ml  IV   10 ml





  BID KATHYA   Administration


 


Sodium Chloride  10 ml  08/14/20 07:50 





  Sodium Chloride Flush Syringe 10 Ml  IV  08/27/20 07:49 





  PRN PRN  





  LINE FLUSH  


 


Trazodone HCl  50 mg  08/14/20 22:00  08/20/20 00:28





  Desyrel  PO   50 mg





  QHS KATHYA   Administration


 


Valproic Acid  1,500 mg  08/18/20 22:00  08/20/20 00:11





  Depakene Liq  PO   1,500 mg





  QHS KATHYA   Administration














Nutrition/Malnutrition Assess





- Dietary Evaluation


Nutrition/Malnutrition Findings: 


                                        





Nutrition Notes                                            Start:  08/17/20 

09:33


Freq:                                                      Status: Active       




Protocol:                                                                       




 Document     08/19/20 14:52  LM  (Rec: 08/19/20 14:56  LM  [id number])


 Nutrition Notes


     Initial or Follow up                        Reassessment


     Current Diagnosis                           Acute Kidney Injury,Sepsis


     Other Pertinent Diagnosis                   COVID-19 (+), Schizoaffective


                                                 disorder


     Current Diet                                Jevity 1.2 at 55ml/hr


     Labs/Tests                                  Na 149


                                                 BUN 19


                                                 


     Pertinent Medications                       Solumedrol


                                                 Humalog


     Height                                      5 ft 6 in


     Weight                                      114 kg


     Ideal Body Weight (kg)                      59.09


     BMI                                         40.5


     Weight Status                               Morbidly Obese


     Subjective/Other Information                TF running at goal rate and pt


                                                 is tolerating. SLP recommends


                                                 pt to continue with TF.


     Burn                                        Absent


     Trauma                                      Absent


     GI Symptoms                                 None


     Difficulty In                               Swallowing


     Food Allergy                                No


     Current % PO                                Negligible


     Minimum of two criteria                     No


     Reduced  Strength                       Measurably Reduced (severe)


     #1


      Nutrition Diagnosis                        Inadequate oral intake


      Diagnosis Progress(for reassessment        Continues


       documentation)                            


     Is patient on ventilator?                   No


     Is Patient Ambulatory and/or Out of Bed     No


     REE-([facility]-confined to bed)      2106.012


     Kcal/Kg value to use for calculation        14


     Approximate Energy Requirements Using       1596


      kcal/Kg                                    


     Calculation Used for Recommendations        Kcal/kg


     Additional Notes                            Protein needs are 69-87g (0.8-


                                                 1g/kg adjusted wt 86.5kg)


                                                 Fluid needs are 1ml/kcal


 Nutrition Intervention


     Change Diet Order:                          TF


     Nutrition Support:                          Jevity 1.2 at 55ml/hr


                                                 Flush with 200ml q4h for


                                                 hypernatremia


     Kcal                                        1,584


     Protein (gm)                                73


     Fluid (mL)                                  1,065


     Goal #1                                     Meet at least 80% of kcal and


                                                 protein needs via TF


     Anticipated Discharge Needs:                Unable to determine at this


                                                 time


     Follow-Up By:                               08/21/20


     Additional Comments                         F/U for TF tolerance, BG, Na

## 2020-08-20 NOTE — GASTROENTEROLOGY CONSULTATION
History of Present Illness





- Reason for Consult


Consult date: 08/20/20


Neurogenic Dysphagia


Requesting physician: JING CERRATO





- History of Present Illness





The patient is a known 56 yo female with psych d/o (schizophrenia).  She was 

admitted with COVID, but also has rhabdo and there was a concern of neuroleptic 

malignant syndrome (eval by Psych is negative).  She remains in NAD, but is 

catatonic with a refusal to interact or swallow.  She is tolerating Dobhoff 

feeds, and is now back on Psych meds.  She did not comply with ST evaluation.  

She refuses to give a hx.





Past History


Past Medical History: hypertension, other (Schizophrenia, COVID, Morbid Obesity)


Past Surgical History: No surgical history


Social history: no significant social history.  denies: smoking, alcohol abuse


Family history: no significant family history





Medications and Allergies


                                    Allergies











Allergy/AdvReac Type Severity Reaction Status Date / Time


 


haloperidol [From Haldol] AdvReac  Unknown Verified 07/31/16 20:22











                                Home Medications











 Medication  Instructions  Recorded  Confirmed  Last Taken  Type


 


Benztropine [Cogentin] 1 mg PO BID 07/30/16 11/28/17 Unknown History


 


Divalproex Sodium [Depakote] 1,500 mg PO QHS 07/30/16 11/28/17 Unknown History


 


Paliperidone Palmitate [Invega 325 mg IM Q3W 07/30/16 11/28/17 Unknown History





Sustenna]     


 


Aspirin 81 mg PO QDAY #30 tablet 08/01/16 11/28/17 Unknown Rx


 


Nitrofurantoin Mono/M-Cryst 100 mg PO Q12HR #14 capsule 06/29/17 11/28/17 

Unknown Rx





[Macrobid CAP]     


 


cephALEXin [Keflex] 500 mg PO Q8HR #10 cap 11/28/17  Unknown Rx











Active Meds: 


Active Medications





Acetaminophen (Tylenol)  650 mg PO Q4H PRN


   PRN Reason: Pain MILD(1-3)/Fever >100.5/HA


   Last Admin: 08/17/20 05:30 Dose:  650 mg


   Documented by: 


Lipase/Protease/Amylase (Pancreaze Dr 10,500 Unit)  1 each FEEDTUBE PRN PRN


   PRN Reason: For Clogged Feeding Tube


Enoxaparin Sodium (Enoxaparin)  40 mg SUB-Q BID [facility]


   Last Admin: 08/20/20 11:39 Dose:  40 mg


   Documented by: 


Haloperidol (Haldol)  5 mg PO BID [facility]


   Last Admin: 08/20/20 11:39 Dose:  5 mg


   Documented by: 


Hydralazine HCl (Apresoline)  10 mg IV Q6HR PRN


   PRN Reason: Hypertension


   Last Admin: 08/20/20 00:09 Dose:  10 mg


   Documented by: 


Sodium Chloride (Nacl 0.45% 1000 Ml)  1,000 mls @ 75 mls/hr IV AS DIRECT [facility]


   Last Admin: 08/20/20 04:29 Dose:  75 mls/hr


   Documented by: 


Insulin Human Lispro (Humalog)  0 unit SUB-Q Q6HR [facility]; Protocol


   Last Admin: 08/20/20 17:31 Dose:  4 unit


   Documented by: 


Methylprednisolone Sodium Succinate (Solu-Medrol)  40 mg IV Q24HR [facility]


   Last Admin: 08/20/20 11:39 Dose:  40 mg


   Documented by: 


Miscellaneous Medication (Paliperidone Palmitate [Invega Sustenna])  325 mg IM 

Q3W [facility]


Ondansetron HCl (Zofran)  4 mg IV Q8H PRN


   PRN Reason: Nausea And Vomiting


Simple Syrup (Simple Syrup)  15 ml FEEDTUBE PRN PRN


   PRN Reason: Hypoglycemia


Simple Syrup (Simple Syrup)  30 ml FEEDTUBE PRN PRN


   PRN Reason: Hypoglycemia


Sodium Bicarbonate (Sodium Bicarbonate)  325 mg FEEDTUBE PRN PRN


   PRN Reason: For Clogged Feeding Tube


Sodium Chloride (Sodium Chloride Flush Syringe 10 Ml)  10 ml IV BID [facility]


   Last Admin: 08/20/20 11:39 Dose:  10 ml


   Documented by: 


Sodium Chloride (Sodium Chloride Flush Syringe 10 Ml)  10 ml IV PRN PRN


   PRN Reason: LINE FLUSH


   Stop: 08/27/20 07:49


Trazodone HCl (Desyrel)  50 mg PO QHS [facility]


   Last Admin: 08/20/20 00:28 Dose:  50 mg


   Documented by: 


Valproic Acid (Depakene Liq)  1,500 mg PO QHS [facility]


   Last Admin: 08/20/20 00:11 Dose:  1,500 mg


   Documented by: 





MEDICATIONS REVIEWED/RECONCILED





Review of Systems





- Review of Systems


ROS unobtainable: due to mental status





Exam





- Constitutional


Vital Signs: 


                                        











Temp Pulse Resp BP Pulse Ox


 


 97.9 F   100 H  24   158/107   100 


 


 08/20/20 11:59  08/20/20 11:59  08/20/20 11:59  08/20/20 11:59  08/20/20 11:59











General appearance: no acute distress





- EENT


Eyes: PERRL, EOM intact


ENT: hearing intact, clear oral mucosa, other (Dobhoff present in nares)





- Neck


Neck: supple, normal ROM





- Respiratory


Respiratory effort: normal


Respiratory: bilateral: CTA





- Cardiovascular


Rhythm: regular


Heart Sounds: Present: S1 & S2


Extremities: no ischemia, No edema





- Gastrointestinal


General gastrointestinal: Present: soft, non-tender, non-distended





- Integumentary


Integumentary: Present: clear, warm, dry





- Neurologic


Neurological: alert and oriented x3





- Labs


CBC & Chem 7: 


                                 08/20/20 06:14





                                 08/20/20 06:14


Lab Results: 


                         Laboratory Results - last 24 hr











  08/19/20 08/20/20 08/20/20





  17:24 00:07 06:14


 


WBC   


 


RBC   


 


Hgb   


 


Hct   


 


MCV   


 


MCH   


 


MCHC   


 


RDW   


 


Plt Count   


 


PT   


 


INR   


 


APTT   


 


D-Dimer    674.88 H


 


Sodium   


 


Potassium   


 


Chloride   


 


Carbon Dioxide   


 


Anion Gap   


 


BUN   


 


Creatinine   


 


Estimated GFR   


 


BUN/Creatinine Ratio   


 


Glucose   


 


POC Glucose  289 H  259 H 


 


Calcium   


 


Ferritin   


 


Lactate Dehydrogenase   


 


C-Reactive Protein   














  08/20/20 08/20/20 08/20/20





  06:14 06:14 06:14


 


WBC    18.4 H


 


RBC    4.47


 


Hgb    13.0


 


Hct    39.5


 


MCV    88


 


MCH    29


 


MCHC    33


 


RDW    15.8 H


 


Plt Count    213


 


PT   


 


INR   


 


APTT   


 


D-Dimer   


 


Sodium   148 H 


 


Potassium   3.7 


 


Chloride   109.4 H 


 


Carbon Dioxide   26 


 


Anion Gap   16 


 


BUN   16 


 


Creatinine   0.8 


 


Estimated GFR   > 60 


 


BUN/Creatinine Ratio   20 


 


Glucose   275 H 


 


POC Glucose   


 


Calcium   8.1 L 


 


Ferritin  1445.0 H  


 


Lactate Dehydrogenase   812 H 


 


C-Reactive Protein   1.70 H 














  08/20/20 08/20/20 08/20/20





  07:28 12:08 15:23


 


WBC   


 


RBC   


 


Hgb   


 


Hct   


 


MCV   


 


MCH   


 


MCHC   


 


RDW   


 


Plt Count   


 


PT    14.9


 


INR    1.15 H


 


APTT    29.5


 


D-Dimer   


 


Sodium   


 


Potassium   


 


Chloride   


 


Carbon Dioxide   


 


Anion Gap   


 


BUN   


 


Creatinine   


 


Estimated GFR   


 


BUN/Creatinine Ratio   


 


Glucose   


 


POC Glucose  240 H  282 H 


 


Calcium   


 


Ferritin   


 


Lactate Dehydrogenase   


 


C-Reactive Protein   














  08/20/20





  17:29


 


WBC 


 


RBC 


 


Hgb 


 


Hct 


 


MCV 


 


MCH 


 


MCHC 


 


RDW 


 


Plt Count 


 


PT 


 


INR 


 


APTT 


 


D-Dimer 


 


Sodium 


 


Potassium 


 


Chloride 


 


Carbon Dioxide 


 


Anion Gap 


 


BUN 


 


Creatinine 


 


Estimated GFR 


 


BUN/Creatinine Ratio 


 


Glucose 


 


POC Glucose  263 H


 


Calcium 


 


Ferritin 


 


Lactate Dehydrogenase 


 


C-Reactive Protein 














Assessment and Plan





- Patient Problems


(1) Neurogenic dysphagia


Current Visit: Yes   Status: Acute   


Plan to address problem: 


- Will continue to monitor progress as Psych meds re-added, and patient recovers

from COVID.


- If fails to improve, will need PEG, but this will be somewhat difficult with 

morbid obesity.


- Continue Dobhoff feeds and supportive care.








(2) Pneumonia due to COVID-19 virus


Current Visit: Yes   Status: Acute   





(3) Paranoid schizophrenia


Current Visit: Yes   Status: Chronic

## 2020-08-20 NOTE — PROGRESS NOTE
Assessment and Plan





Cultures:


Blood culture and urine culture negative


COVID PCR positive


 


Assessment: 55 years old female with history of paranoid schizophrenia, admitted

on 8/13/2020 due to altered mental status and catatonia state:





#Severe sepsis: Present on admission with low-grade fever, tachycardia, elevated

leukocytosis, WHITNEY, likely due to bilateral pneumonia. 





#Bilateral pneumonia: COVID-19 pneumonia.  Inflammatory markers are elevated, 

with a d-dimer > 10,000.  CT negative for pulmonary embolism. Repeat d-dimer 

with much improvement. Renal function and LFTs improving, but given mild hypoxia

and several days of positivity, unclear benefit with Remdesivir at this stage. 

Completed empiric CAP abx x 5 days.





#Acute hypoxemic respiratory failure: mild. on supplemental oxygen.





#Elevated LFTs: from COVID.





#WHITNEY: from COVID. improved





#Elevated LFTs: Very high likely secondary to rhabdomyolysis





#Elevated CK: Likely secondary to rhabdomyolysis possible due to COVID-19 

infection or catatonic state





#Paranoid schizophrenia with catatonia: Per psych





Recommendations:


-continue steroids, day 6 of 10


-supportive care and oxygen weaning as tolerated


-anticoagulation per protocol based on d-dimer





Blank Coppola MD, FACP


[facility] ([facility])


C: [phone]


O: [phone]


F: [phone]





Subjective


Date of service: 08/20/20


Interval history: 


No fever.  Oxygen requirements stable at 2-3 L nasal cannula.





Objective





- Exam


Narrative Exam: 





Physical Exam (reviewed in chart due to PPE conservation)


Constitutional: limited due to PPE conservation strategy


Head, Ears, Nose: limited due to PPE conservation strategy


Eyes: limited due to PPE conservation strategy


Neck: limited due to PPE conservation strategy


Oral: limited due to PPE conservation strategy


Cardiovascular: limited due to PPE conservation strategy


Respiratory: limited due to PPE conservation strategy


GI: limited due to PPE conservation strategy


Musculoskeletal: limited due to PPE conservation strategy


Skin: limited due to PPE conservation strategy


Hem/Lymphatic: limited due to PPE conservation strategy


Psych: limited due to PPE conservation strategy


Neurological: limited due to PPE conservation strategy   





- Constitutional


Vitals: 


                                   Vital Signs











Temp Pulse Resp BP Pulse Ox


 


 98.7 F   106 H  20   170/87   97 


 


 08/20/20 04:00  08/20/20 04:00  08/19/20 23:58  08/20/20 03:58  08/20/20 04:00








                           Temperature -Last 24 Hours











Temperature                    98.7 F


 


Temperature                    97.1 F


 


Temperature                    98.8 F

















- Labs


CBC & Chem 7: 


                                 08/20/20 06:14





                                 08/20/20 06:14


Labs: 


                              Abnormal lab results











  08/19/20 08/20/20 08/20/20 Range/Units





  17:24 00:07 06:14 


 


WBC     (4.5-11.0)  K/mm3


 


RDW     (13.2-15.2)  %


 


D-Dimer    674.88 H  (0-234)  ng/mlDDU


 


Sodium     (137-145)  mmol/L


 


Chloride     ()  mmol/L


 


Glucose     ()  mg/dL


 


POC Glucose  289 H  259 H   ()  


 


Calcium     (8.4-10.2)  mg/dL


 


Ferritin     (10.0-200.0)  ng/mL


 


Lactate Dehydrogenase     ()  units/L


 


C-Reactive Protein     (0.00-1.30)  mg/dL














  08/20/20 08/20/20 08/20/20 Range/Units





  06:14 06:14 06:14 


 


WBC    18.4 H  (4.5-11.0)  K/mm3


 


RDW    15.8 H  (13.2-15.2)  %


 


D-Dimer     (0-234)  ng/mlDDU


 


Sodium   148 H   (137-145)  mmol/L


 


Chloride   109.4 H   ()  mmol/L


 


Glucose   275 H   ()  mg/dL


 


POC Glucose     ()  


 


Calcium   8.1 L   (8.4-10.2)  mg/dL


 


Ferritin  1445.0 H    (10.0-200.0)  ng/mL


 


Lactate Dehydrogenase   812 H   ()  units/L


 


C-Reactive Protein   1.70 H   (0.00-1.30)  mg/dL














  08/20/20 08/20/20 Range/Units





  07:28 12:08 


 


WBC    (4.5-11.0)  K/mm3


 


RDW    (13.2-15.2)  %


 


D-Dimer    (0-234)  ng/mlDDU


 


Sodium    (137-145)  mmol/L


 


Chloride    ()  mmol/L


 


Glucose    ()  mg/dL


 


POC Glucose  240 H  282 H  ()  


 


Calcium    (8.4-10.2)  mg/dL


 


Ferritin    (10.0-200.0)  ng/mL


 


Lactate Dehydrogenase    ()  units/L


 


C-Reactive Protein    (0.00-1.30)  mg/dL

## 2020-08-21 LAB
BAND NEUTROPHILS # (MANUAL): 0.7 K/MM3
BUN SERPL-MCNC: 19 MG/DL (ref 7–17)
BUN/CREAT SERPL: 27 %
CALCIUM SERPL-MCNC: 7.9 MG/DL (ref 8.4–10.2)
HCT VFR BLD CALC: 35.9 % (ref 30.3–42.9)
HEMOLYSIS INDEX: 11
HGB BLD-MCNC: 11.7 GM/DL (ref 10.1–14.3)
MACROCYTES BLD QL SMEAR: (no result)
MCHC RBC AUTO-ENTMCNC: 33 % (ref 30–34)
MCV RBC AUTO: 89 FL (ref 79–97)
MYELOCYTES # (MANUAL): 0 K/MM3
PLATELET # BLD: 189 K/MM3 (ref 140–440)
PROMYELOCYTES # (MANUAL): 0 K/MM3
RBC # BLD AUTO: 4.04 M/MM3 (ref 3.65–5.03)
TARGETS BLD QL SMEAR: (no result)
TOTAL CELLS COUNTED BLD: 100

## 2020-08-21 RX ADMIN — ENOXAPARIN SODIUM SCH MG: 100 INJECTION SUBCUTANEOUS at 21:23

## 2020-08-21 RX ADMIN — ENOXAPARIN SODIUM SCH MG: 100 INJECTION SUBCUTANEOUS at 09:22

## 2020-08-21 RX ADMIN — HALOPERIDOL SCH MG: 5 TABLET ORAL at 21:24

## 2020-08-21 RX ADMIN — INSULIN LISPRO SCH UNIT: 100 INJECTION, SOLUTION INTRAVENOUS; SUBCUTANEOUS at 23:57

## 2020-08-21 RX ADMIN — HALOPERIDOL SCH: 5 TABLET ORAL at 09:27

## 2020-08-21 RX ADMIN — INSULIN LISPRO SCH UNIT: 100 INJECTION, SOLUTION INTRAVENOUS; SUBCUTANEOUS at 18:00

## 2020-08-21 RX ADMIN — VALPROIC ACID SCH MG: 250 SOLUTION ORAL at 21:23

## 2020-08-21 RX ADMIN — INSULIN LISPRO SCH UNIT: 100 INJECTION, SOLUTION INTRAVENOUS; SUBCUTANEOUS at 06:04

## 2020-08-21 RX ADMIN — INSULIN LISPRO SCH UNIT: 100 INJECTION, SOLUTION INTRAVENOUS; SUBCUTANEOUS at 12:02

## 2020-08-21 RX ADMIN — SODIUM CHLORIDE SCH MLS/HR: 0.45 INJECTION, SOLUTION INTRAVENOUS at 18:26

## 2020-08-21 RX ADMIN — ACETAMINOPHEN PRN MG: 325 TABLET ORAL at 12:10

## 2020-08-21 RX ADMIN — Medication SCH ML: at 21:25

## 2020-08-21 RX ADMIN — METHYLPREDNISOLONE SODIUM SUCCINATE SCH MG: 40 INJECTION, POWDER, FOR SOLUTION INTRAMUSCULAR; INTRAVENOUS at 09:23

## 2020-08-21 RX ADMIN — SODIUM CHLORIDE SCH MLS/HR: 0.45 INJECTION, SOLUTION INTRAVENOUS at 06:05

## 2020-08-21 RX ADMIN — Medication SCH ML: at 09:23

## 2020-08-21 RX ADMIN — INSULIN LISPRO SCH UNIT: 100 INJECTION, SOLUTION INTRAVENOUS; SUBCUTANEOUS at 00:21

## 2020-08-21 NOTE — PROGRESS NOTE
Assessment and Plan





Cultures:


Blood culture and urine culture negative


COVID PCR positive


 


Assessment: 55 years old female with history of paranoid schizophrenia, admitted

on 8/13/2020 due to altered mental status and catatonia state:





#Severe sepsis: Present on admission with low-grade fever, tachycardia, elevated

leukocytosis, WHITNEY, likely due to bilateral pneumonia. 





#Bilateral pneumonia: COVID-19 pneumonia.  Inflammatory markers are elevated, 

with a d-dimer > 10,000.  CT negative for pulmonary embolism. Repeat d-dimer 

with much improvement. Renal function and LFTs improving, but given mild hypoxia

and several days of positivity, unclear benefit with Remdesivir at this stage. 

Completed empiric CAP abx x 5 days.





#Acute hypoxemic respiratory failure: mild. on supplemental oxygen.





#Elevated LFTs: from COVID.





#WHITNEY: from COVID. improved





#Elevated LFTs: Very high likely secondary to rhabdomyolysis





#Elevated CK: Likely secondary to rhabdomyolysis possible due to COVID-19 

infection or catatonic state





#Paranoid schizophrenia with catatonia: Per psych





Recommendations:


-continue steroids, day 7 of 10


-supportive care and oxygen weaning as tolerated


-anticoagulation per protocol based on d-dimer





Blank Coppola MD, FACP


[facility] ([facility])


C: [phone]


O: [phone]


F: [phone]





Subjective


Date of service: 08/21/20


Interval history: 


No fever.  Oxygen requirements stable at 2 L nasal cannula.





Objective





- Exam


Narrative Exam: 





Physical Exam (reviewed in chart due to PPE conservation)


Constitutional: limited due to PPE conservation strategy


Head, Ears, Nose: limited due to PPE conservation strategy


Eyes: limited due to PPE conservation strategy


Neck: limited due to PPE conservation strategy


Oral: limited due to PPE conservation strategy


Cardiovascular: limited due to PPE conservation strategy


Respiratory: limited due to PPE conservation strategy


GI: limited due to PPE conservation strategy


Musculoskeletal: limited due to PPE conservation strategy


Skin: limited due to PPE conservation strategy


Hem/Lymphatic: limited due to PPE conservation strategy


Psych: limited due to PPE conservation strategy


Neurological: limited due to PPE conservation strategy    





- Constitutional


Vitals: 


                                   Vital Signs











Temp Pulse Resp BP Pulse Ox


 


 98.7 F   76   20   133/64   99 


 


 08/21/20 10:54  08/21/20 10:54  08/21/20 10:54  08/21/20 10:54  08/21/20 10:54








                           Temperature -Last 24 Hours











Temperature                    98.7 F


 


Temperature                    98.3 F


 


Temperature                    98.1 F


 


Temperature                    98.1 F

















- Labs


CBC & Chem 7: 


                                 08/21/20 04:50





                                 08/21/20 04:50


Labs: 


                              Abnormal lab results











  08/20/20 08/20/20 08/21/20 Range/Units





  15:23 17:29 00:10 


 


WBC     (4.5-11.0)  K/mm3


 


RDW     (13.2-15.2)  %


 


Seg Neuts % (Manual)     (40.0-70.0)  %


 


Lymphocytes % (Manual)     (13.4-35.0)  %


 


Nucleated RBC %     (0.0-0.9)  %


 


Seg Neutrophils # Man     (1.8-7.7)  K/mm3


 


Lymphocytes # (Manual)     (1.2-5.4)  K/mm3


 


Monocytes # (Manual)     (0.0-0.8)  K/mm3


 


INR  1.15 H    (0.87-1.13)  


 


BUN     (7-17)  mg/dL


 


Glucose     ()  mg/dL


 


POC Glucose   263 H  349 H  ()  


 


Calcium     (8.4-10.2)  mg/dL














  08/21/20 08/21/20 08/21/20 Range/Units





  04:50 04:50 06:13 


 


WBC  16.5 H    (4.5-11.0)  K/mm3


 


RDW  15.9 H    (13.2-15.2)  %


 


Seg Neuts % (Manual)  87.0 H    (40.0-70.0)  %


 


Lymphocytes % (Manual)  2.0 L    (13.4-35.0)  %


 


Nucleated RBC %  1.0 H    (0.0-0.9)  %


 


Seg Neutrophils # Man  14.4 H    (1.8-7.7)  K/mm3


 


Lymphocytes # (Manual)  0.3 L    (1.2-5.4)  K/mm3


 


Monocytes # (Manual)  1.0 H    (0.0-0.8)  K/mm3


 


INR     (0.87-1.13)  


 


BUN   19 H   (7-17)  mg/dL


 


Glucose   334 H   ()  mg/dL


 


POC Glucose    273 H  ()  


 


Calcium   7.9 L   (8.4-10.2)  mg/dL














  08/21/20 Range/Units





  11:41 


 


WBC   (4.5-11.0)  K/mm3


 


RDW   (13.2-15.2)  %


 


Seg Neuts % (Manual)   (40.0-70.0)  %


 


Lymphocytes % (Manual)   (13.4-35.0)  %


 


Nucleated RBC %   (0.0-0.9)  %


 


Seg Neutrophils # Man   (1.8-7.7)  K/mm3


 


Lymphocytes # (Manual)   (1.2-5.4)  K/mm3


 


Monocytes # (Manual)   (0.0-0.8)  K/mm3


 


INR   (0.87-1.13)  


 


BUN   (7-17)  mg/dL


 


Glucose   ()  mg/dL


 


POC Glucose  311 H  ()  


 


Calcium   (8.4-10.2)  mg/dL

## 2020-08-21 NOTE — PROGRESS NOTE
<KWAKUPADMA LAURENMarco A - Last Filed: 08/21/20 15:30>





Assessment and Plan





- Patient Problems


(1) Severe sepsis


Current Visit: Yes   Status: Resolved   


Plan to address problem: 


- Presented with low-grade fever, tachycardia, leukocytosis, WHITNEY, and bilateral 

pneumonia


- Likely source is bilateral pneumonia secondary to COVID-19


- IV antibiotics completed


- Covid protocol initiated











(2) Pneumonia due to COVID-19 virus


Current Visit: Yes   Status: Acute   


Plan to address problem: 


- COVID protocol initiated


- IV azithromycin and Rocephin completed


- 8/14 COVID PCR positive


- Trend LDH, CRP, procalcitonin, d-dimer, ferritin 


- Droplet /contact precautions


- Supplemental oxygenation as needed


- Infectious disease consult requested


- Pulmonary hygiene


- In setting of elevated D-Dimer, CTA Chest was ordered which was negative for 

pulmonary embolism


- Anticoagulation per COVID protocol


- Cannot initiate Remdesivir in setting on of elevated LFTs


- Continue p.o. dexamethasone








(3) Acute psychosis


Current Visit: Yes   Status: Acute   


Plan to address problem: 


- Currently withdraws to pain however moving all extremities spontaneously


- History of paranoid schizophrenia


- Mental health consult requested and does not recommend inpatient psych at this

time as medical needs outweigh psych this time


- Requested to reconsult when patient is medically stable


- GI consulted for PEG tube placement to facilitate placement








(4) Paranoid schizophrenia


Current Visit: Yes   Status: Chronic   


Plan to address problem: 


-History of present schizophrenia


-Psych consulted and appreciate recommendations


-Depakote and Haldol 


-Sleep hygiene


-Reorientation as needed








(5) Leukocytosis


Current Visit: Yes   Status: Acute   


Plan to address problem: 


- Admit WBC 13.8


- Trend CBC


- Maybe secondary to possible PNA or from dehydration


- IV abx completed


- Also on steroids for COVID pneumonia








(6) DVT prophylaxis


Current Visit: Yes   Status: Acute   


Plan to address problem: 


- SCDs to BLE while in bed


- Lovenox subq twice daily per COVID protocol








History


Interval history: 





This is a 55-year-old female with paranoid schizophrenia that presents to the 

emergency department on 8/13 for altered mental status in a catatonic state.  

Previous hospitalizations reviewed and only notes a history of paranoid 

schizophrenia. Patient may be a resident of [place].  Patient is currently 

nonverbal and HPI is received from ER documentation.  Work-up in the emergency 

department included a CT abdomen pelvis which shows patchy peripheral 

groundglass opacities bilateral lungs which is consistent with either viral or 

atypical pneumonia, positive anterior lateral fat hernia in the left mid abdomen

and a large uterine fibroids however her CXR showed no acute pulmonary or 

pleural abnormalities.  She was found to have acute kidney injury with a 

creatinine of 1.4/BUN 33 as baseline from previous records seems to be 0.7, 

rhabdomyolysis with a creatinine kinase of 33,481, leukocytosis with a WBC of 

13.8, thrombocytopenia with platelets at 97.  She has COVID pneumonia with IV 

antibiotics, rhabdomyolysis, and WHITNEY. Infectious disease, psych, and neurology 

have been consulted. Neurology does not suspect NMS at this time. Psych has 

signed off at this time due to ongoing acute medical processes.  PT/ST consulted

for evaluation but unable to be performed as patient will not follow verbal 

commands.  She still moves and groans to painful stimuli however she does not 

open her eyes to verbal or physical stimuli.  GI was consulted for PEG 

placement.





* 8/20: GI consult for peg


* 8/19: continue care


* Discussed with primary caretaker patient was normally verbal but sometimes 

  goes into a catatonic state.  Phone number for this is [phone]


* 8/18: PT consulted.  Psych signed off.  Continue treatment


* 8/17: continue treatment


* 8/16: Change fluids to NS 1/2 AT 125CC/hr. Rhabdomylysis improving, insert NGT

  and start tube feeds. Psych input noted. Continue management for COVID 19. ID 

  consulted. No new fever.  CTA and CTH obtained which were both negative


* 8/15: COVID PCR Positive








Hospitalist Physical





- Constitutional


Vitals: 


                                        











Temp Pulse Resp BP Pulse Ox


 


 98.7 F   76   20   133/64   99 


 


 08/21/20 10:54  08/21/20 10:54  08/21/20 10:54  08/21/20 10:54  08/21/20 10:54











General appearance: Present: no acute distress, other (Patient slightly opens 

her eyes to tactile stimuli and withdraws to painful stimuli in all 4 extremitie

s.  She is seen MCFADDEN spontaneously.)





- EENT


Eyes: Present: PERRL


ENT: hearing decreased





- Neck


Neck: Present: normal ROM





- Respiratory


Respiratory effort: normal


Respiratory: bilateral: diminished





- Cardiovascular


Rhythm: regular


Heart Sounds: Present: S1 & S2.  Absent: systolic murmur, diastolic murmur





- Extremities


Extremities: no ischemia, pulses intact, No edema, normal temperature, normal 

color, Full ROM


Peripheral Pulses: within normal limits





- Abdominal


General gastrointestinal: soft, non-tender, non-distended





- Integumentary


Integumentary: Present: clear, warm, dry





- Psychiatric


Psychiatric: other (moveemnt to painful stimuli and spontaneously only)





- Neurologic


Neurologic: no focal deficits, moves all extremities





- Allied Health


Allied health notes reviewed: nursing, social work, case management





Results





- Labs


CBC & Chem 7: 


                                 08/21/20 04:50





                                 08/21/20 04:50


Labs: 


                             Laboratory Last Values











WBC  16.5 K/mm3 (4.5-11.0)  H  08/21/20  04:50    


 


RBC  4.04 M/mm3 (3.65-5.03)   08/21/20  04:50    


 


Hgb  11.7 gm/dl (10.1-14.3)   08/21/20  04:50    


 


Hct  35.9 % (30.3-42.9)   08/21/20  04:50    


 


MCV  89 fl (79-97)   08/21/20  04:50    


 


MCH  29 pg (28-32)   08/21/20  04:50    


 


MCHC  33 % (30-34)   08/21/20  04:50    


 


RDW  15.9 % (13.2-15.2)  H  08/21/20  04:50    


 


Plt Count  189 K/mm3 (140-440)   08/21/20  04:50    


 


Lymph % (Auto)  10.6 % (13.4-35.0)  L  08/13/20  22:44    


 


Mono % (Auto)  9.8 % (0.0-7.3)  H  08/13/20  22:44    


 


Eos % (Auto)  0.0 % (0.0-4.3)   08/13/20  22:44    


 


Baso % (Auto)  1.2 % (0.0-1.8)   08/13/20  22:44    


 


Lymph #  1.5 K/mm3 (1.2-5.4)   08/13/20  22:44    


 


Mono #  1.3 K/mm3 (0.0-0.8)  H  08/13/20  22:44    


 


Eos #  0.0 K/mm3 (0.0-0.4)   08/13/20  22:44    


 


Baso #  0.2 K/mm3 (0.0-0.1)  H  08/13/20  22:44    


 


Add Manual Diff  Complete   08/21/20  04:50    


 


Total Counted  100   08/21/20  04:50    


 


Seg Neutrophils %  78.4 % (40.0-70.0)  H  08/13/20  22:44    


 


Seg Neuts % (Manual)  87.0 % (40.0-70.0)  H  08/21/20  04:50    


 


Band Neutrophils %  4.0 %  08/21/20  04:50    


 


Lymphocytes % (Manual)  2.0 % (13.4-35.0)  L  08/21/20  04:50    


 


Reactive Lymphs % (Man)  0 %  08/21/20  04:50    


 


Monocytes % (Manual)  6.0 % (0.0-7.3)   08/21/20  04:50    


 


Eosinophils % (Manual)  0 % (0.0-4.3)   08/21/20  04:50    


 


Basophils % (Manual)  0 % (0.0-1.8)   08/21/20  04:50    


 


Metamyelocytes %  1.0 %  08/21/20  04:50    


 


Myelocytes %  0 %  08/21/20  04:50    


 


Promyelocytes %  0 %  08/21/20  04:50    


 


Blast Cells %  0 %  08/21/20  04:50    


 


Nucleated RBC %  1.0 % (0.0-0.9)  H  08/21/20  04:50    


 


Seg Neutrophils #  10.8 K/mm3 (1.8-7.7)  H  08/13/20  22:44    


 


Seg Neutrophils # Man  14.4 K/mm3 (1.8-7.7)  H  08/21/20  04:50    


 


Band Neutrophils #  0.7 K/mm3  08/21/20  04:50    


 


Lymphocytes # (Manual)  0.3 K/mm3 (1.2-5.4)  L  08/21/20  04:50    


 


Abs React Lymphs (Man)  0.0 K/mm3  08/21/20  04:50    


 


Monocytes # (Manual)  1.0 K/mm3 (0.0-0.8)  H  08/21/20  04:50    


 


Eosinophils # (Manual)  0.0 K/mm3 (0.0-0.4)   08/21/20  04:50    


 


Basophils # (Manual)  0.0 K/mm3 (0.0-0.1)   08/21/20  04:50    


 


Metamyelocytes #  0.2 K/mm3  08/21/20  04:50    


 


Myelocytes #  0.0 K/mm3  08/21/20  04:50    


 


Promyelocytes #  0.0 K/mm3  08/21/20  04:50    


 


Blast Cells #  0.0 K/mm3  08/21/20  04:50    


 


WBC Morphology  Not Reportable   08/21/20  04:50    


 


Hypersegmented Neuts  Not Reportable   08/21/20  04:50    


 


Hyposegmented Neuts  Not Reportable   08/21/20  04:50    


 


Hypogranular Neuts  Not Reportable   08/21/20  04:50    


 


Smudge Cells  Not Reportable   08/21/20  04:50    


 


Toxic Granulation  Not Reportable   08/21/20  04:50    


 


Toxic Vacuolation  Not Reportable   08/21/20  04:50    


 


Dohle Bodies  Not Reportable   08/21/20  04:50    


 


Pelger-Huet Anomaly  Not Reportable   08/21/20  04:50    


 


Arnie Rods  Not Reportable   08/21/20  04:50    


 


Platelet Estimate  Consistent w auto   08/21/20  04:50    


 


Clumped Platelets  Not Reportable   08/21/20  04:50    


 


Plt Clumps, EDTA  Not Reportable   08/21/20  04:50    


 


Large Platelets  Not Reportable   08/21/20  04:50    


 


Giant Platelets  Not Reportable   08/21/20  04:50    


 


Platelet Satelliting  Not Reportable   08/21/20  04:50    


 


Plt Morphology Comment  Not Reportable   08/21/20  04:50    


 


RBC Morphology  Not Reportable   08/21/20  04:50    


 


Dimorphic RBCs  Not Reportable   08/21/20  04:50    


 


Polychromasia  Not Reportable   08/21/20  04:50    


 


Hypochromasia  Not Reportable   08/21/20  04:50    


 


Poikilocytosis  Not Reportable   08/21/20  04:50    


 


Anisocytosis  Not Reportable   08/21/20  04:50    


 


Microcytosis  Not Reportable   08/21/20  04:50    


 


Macrocytosis  1+   08/21/20  04:50    


 


Spherocytes  Not Reportable   08/21/20  04:50    


 


Pappenheimer Bodies  Not Reportable   08/21/20  04:50    


 


Sickle Cells  Not Reportable   08/21/20  04:50    


 


Target Cells  1+   08/21/20  04:50    


 


Tear Drop Cells  Not Reportable   08/21/20  04:50    


 


Ovalocytes  Not Reportable   08/21/20  04:50    


 


Helmet Cells  Not Reportable   08/21/20  04:50    


 


Mancera-Wacissa Bodies  Not Reportable   08/21/20  04:50    


 


Cabot Rings  Not Reportable   08/21/20  04:50    


 


Mason Cells  Not Reportable   08/21/20  04:50    


 


Bite Cells  Not Reportable   08/21/20  04:50    


 


Crenated Cell  Not Reportable   08/21/20  04:50    


 


Elliptocytes  Not Reportable   08/21/20  04:50    


 


Acanthocytes (Spur)  Not Reportable   08/21/20  04:50    


 


Rouleaux  Not Reportable   08/21/20  04:50    


 


Hemoglobin C Crystals  Not Reportable   08/21/20  04:50    


 


Schistocytes  Not Reportable   08/21/20  04:50    


 


Malaria parasites  Not Reportable   08/21/20  04:50    


 


Dakota Bodies  Not Reportable   08/21/20  04:50    


 


Hem Pathologist Commnt  No   08/21/20  04:50    


 


PT  14.9 Sec. (12.2-14.9)   08/20/20  15:23    


 


INR  1.15  (0.87-1.13)  H  08/20/20  15:23    


 


APTT  29.5 Sec. (24.2-36.6)   08/20/20  15:23    


 


D-Dimer  674.88 ng/mlDDU (0-234)  H  08/20/20  06:14    


 


Sodium  144 mmol/L (137-145)   08/21/20  04:50    


 


Potassium  4.1 mmol/L (3.6-5.0)   08/21/20  04:50    


 


Chloride  105.8 mmol/L ()   08/21/20  04:50    


 


Carbon Dioxide  27 mmol/L (22-30)   08/21/20  04:50    


 


Anion Gap  15 mmol/L  08/21/20  04:50    


 


BUN  19 mg/dL (7-17)  H  08/21/20  04:50    


 


Creatinine  0.7 mg/dL (0.6-1.2)   08/21/20  04:50    


 


Estimated GFR  > 60 ml/min  08/21/20  04:50    


 


BUN/Creatinine Ratio  27 %  08/21/20  04:50    


 


Glucose  334 mg/dL ()  H  08/21/20  04:50    


 


POC Glucose  311  ()  H  08/21/20  11:41    


 


Lactic Acid  1.30 mmol/L (0.7-2.0)   08/13/20  22:44    


 


Calcium  7.9 mg/dL (8.4-10.2)  L  08/21/20  04:50    


 


Ferritin  1445.0 ng/mL (10.0-200.0)  H  08/20/20  06:14    


 


Total Bilirubin  0.40 mg/dL (0.1-1.2)   08/18/20  08:32    


 


Direct Bilirubin  0.3 mg/dL (0-0.2)  H  08/13/20  22:44    


 


Indirect Bilirubin  0.2 mg/dL  08/13/20  22:44    


 


AST  149 units/L (5-40)  H  08/18/20  08:32    


 


ALT  96 units/L (7-56)  H  08/18/20  08:32    


 


Alkaline Phosphatase  40 units/L ()   08/18/20  08:32    


 


Lactate Dehydrogenase  812 units/L ()  H  08/20/20  06:14    


 


Total Creatine Kinase  934 units/L ()  H  08/18/20  08:32    


 


C-Reactive Protein  1.70 mg/dL (0.00-1.30)  H  08/20/20  06:14    


 


Total Protein  6.1 g/dL (6.3-8.2)  L  08/18/20  08:32    


 


Albumin  2.5 g/dL (3.9-5)  L  08/18/20  08:32    


 


Albumin/Globulin Ratio  0.7 %  08/18/20  08:32    


 


Procalcitonin  0.71 ng/mL (<0.15)   08/18/20  08:32    


 


Urine Color  Tiffany  (Yellow)   08/14/20  Unknown


 


Urine Turbidity  Slightly-cloudy  (Clear)   08/14/20  Unknown


 


Urine pH  5.0  (5.0-7.0)   08/14/20  Unknown


 


Ur Specific Gravity  1.026  (1.003-1.030)   08/14/20  Unknown


 


Urine Protein  100 mg/dl mg/dL (Negative)   08/14/20  Unknown


 


Urine Glucose (UA)  Neg mg/dL (Negative)   08/14/20  Unknown


 


Urine Ketones  Tr mg/dL (Negative)   08/14/20  Unknown


 


Urine Blood  Lg  (Negative)   08/14/20  Unknown


 


Urine Nitrite  Neg  (Negative)   08/14/20  Unknown


 


Urine Bilirubin  Neg  (Negative)   08/14/20  Unknown


 


Urine Urobilinogen  2.0 mg/dL (<2.0)   08/14/20  Unknown


 


Ur Leukocyte Esterase  Neg  (Negative)   08/14/20  Unknown


 


Urine WBC (Auto)  17.0 /HPF (0.0-6.0)  H  08/14/20  Unknown


 


Urine RBC (Auto)  1.0 /HPF (0.0-6.0)   08/14/20  Unknown


 


U Epithel Cells (Auto)  4.0 /HPF (0-13.0)   08/14/20  Unknown


 


Urine Mucus  2+ /HPF  08/14/20  Unknown


 


Salicylates  < 0.3 mg/dL (2.8-20.0)  L  08/13/20  22:44    


 


Urine Opiates Screen  Presumptive negative   08/14/20  Unknown


 


Urine Methadone Screen  Presumptive negative   08/14/20  Unknown


 


Acetaminophen  5.0 ug/mL (10.0-30.0)  L  08/13/20  22:44    


 


Ur Barbiturates Screen  Presumptive negative   08/14/20  Unknown


 


Valproic Acid  31.8 ug/mL ()  L  08/17/20  08:46    


 


Ur Phencyclidine Scrn  Presumptive negative   08/14/20  Unknown


 


Ur Amphetamines Screen  Presumptive negative   08/14/20  Unknown


 


U Benzodiazepines Scrn  Presumptive negative   08/14/20  Unknown


 


Urine Cocaine Screen  Presumptive negative   08/14/20  Unknown


 


U Marijuana (THC) Screen  Presumptive negative   08/14/20  Unknown


 


Drugs of Abuse Note  Disclamer   08/14/20  Unknown


 


Plasma/Serum Alcohol  < 0.01 % (0-0.07)   08/13/20  22:44    


 


Coronavirus (PCR)  Positive  (Negative)  A  08/15/20  Unknown











Schmidt/IV: 


                                        





Voiding Method                   External Female Catheter


IV Catheter Type [Right Upper    Peripheral IV


arm]                             


IV Catheter Type [Right          INT / Saline Lock


Antecubital]                     


IV Catheter Type [Right Foot]    INT / Saline Lock


IV Catheter Type [Right          Peripheral IV


External Jugular]                











Active Medications





- Current Medications


Current Medications: 














Generic Name Dose Route Start Last Admin





  Trade Name Freq  PRN Reason Stop Dose Admin


 


Acetaminophen  650 mg  08/14/20 08:00  08/21/20 12:10





  Tylenol  PO   650 mg





  Q4H PRN   Administration





  Pain MILD(1-3)/Fever >100.5/HA  


 


Lipase/Protease/Amylase  1 each  08/17/20 10:34 





  Pancremarilin Clay 10,500 Unit  FEEDTUBE  





  PRN PRN  





  For Clogged Feeding Tube  


 


Enoxaparin Sodium  40 mg  08/17/20 22:00  08/21/20 09:22





  Enoxaparin  SUB-Q   40 mg





  BID KATHYA   Administration


 


Haloperidol  5 mg  08/15/20 12:00  08/21/20 09:27





  Haldol  PO   Not Given





  BID KATHYA  


 


Hydralazine HCl  10 mg  08/19/20 00:38  08/20/20 00:09





  Apresoline  IV   10 mg





  Q6HR PRN   Administration





  Hypertension  


 


Sodium Chloride  1,000 mls @ 75 mls/hr  08/19/20 12:00  08/21/20 06:05





  Nacl 0.45% 1000 Ml  IV   75 mls/hr





  AS DIRECT KATHYA   Administration


 


Insulin Human Lispro  0 unit  08/19/20 01:00  08/21/20 12:02





  Humalog  SUB-Q   6 unit





  Q6HR KATHYA   Administration





  Protocol  


 


Methylprednisolone Sodium Succinate  40 mg  08/20/20 10:00  08/21/20 09:23





  Solu-Medrol  IV   40 mg





  Q24HR KATHYA   Administration


 


Miscellaneous Medication  325 mg  08/15/20 18:45 





  Paliperidone Palmitate [Invega Sustenna]  IM  





  Q3W KATHYA  


 


Ondansetron HCl  4 mg  08/14/20 08:00 





  Zofran  IV  





  Q8H PRN  





  Nausea And Vomiting  


 


Simple Syrup  15 ml  08/17/20 10:34 





  Simple Syrup  FEEDTUBE  





  PRN PRN  





  Hypoglycemia  


 


Simple Syrup  30 ml  08/17/20 10:34 





  Simple Syrup  FEEDTUBE  





  PRN PRN  





  Hypoglycemia  


 


Sodium Bicarbonate  325 mg  08/17/20 10:34 





  Sodium Bicarbonate  FEEDTUBE  





  PRN PRN  





  For Clogged Feeding Tube  


 


Sodium Chloride  10 ml  08/14/20 10:00  08/21/20 09:23





  Sodium Chloride Flush Syringe 10 Ml  IV   10 ml





  BID KATHYA   Administration


 


Sodium Chloride  10 ml  08/14/20 07:50 





  Sodium Chloride Flush Syringe 10 Ml  IV  08/27/20 07:49 





  PRN PRN  





  LINE FLUSH  


 


Trazodone HCl  50 mg  08/14/20 22:00  08/20/20 21:26





  Desyrel  PO   50 mg





  QHS KATHYA   Administration


 


Valproic Acid  1,500 mg  08/18/20 22:00  08/20/20 21:26





  Depakene Liq  PO   1,500 mg





  QHS KATHYA   Administration














Nutrition/Malnutrition Assess





- Dietary Evaluation


Nutrition/Malnutrition Findings: 


                                        





Nutrition Notes                                            Start:  08/17/20 

09:33


Freq:                                                      Status: Active       




Protocol:                                                                       




 Document     08/21/20 12:14  LM  (Rec: 08/21/20 12:22  LM  [id number])


 Nutrition Notes


     Initial or Follow up                        Reassessment


     Current Diagnosis                           Acute Kidney Injury,Sepsis


     Other Pertinent Diagnosis                   COVID-19 (+), Schizoaffective


                                                 disorder


     Current Diet                                Jevity 1.2 at 55ml/hr


     Labs/Tests                                  POC glu 311


     Pertinent Medications                       Solumedrol


                                                 Humalog


                                                 NS at 75ml/hr


     Height                                      5 ft 6 in


     Weight                                      114 kg


     Ideal Body Weight (kg)                      59.09


     BMI                                         40.5


     Weight Status                               Morbidly Obese


     Subjective/Other Information                TF running at goal and pt is


                                                 tolerating. BG remains


                                                 elevated. Will change TF.


     Burn                                        Absent


     Trauma                                      Absent


     GI Symptoms                                 None


     Difficulty In                               Swallowing


     Food Allergy                                No


     Current % PO                                Negligible


     Minimum of two criteria                     No


     Reduced  Strength                       Measurably Reduced (severe)


     #1


      Nutrition Diagnosis                        Inadequate oral intake


      Diagnosis Progress(for reassessment        Continues


       documentation)                            


     Is patient on ventilator?                   No


     Is Patient Ambulatory and/or Out of Bed     No


     REE-([facility]-confined to bed)      2106.012


     Kcal/Kg value to use for calculation        14


     Approximate Energy Requirements Using       1596


      kcal/Kg                                    


     Calculation Used for Recommendations        Kcal/kg


     Additional Notes                            Protein needs are 69-87g (0.8-


                                                 1g/kg adjusted wt 86.5kg)


                                                 Fluid needs are 1ml/kcal


 Nutrition Intervention


     Change Diet Order:                          TF


     Nutrition Support:                          Change to Glucerna 1.2 at 55ml


                                                 /hr


                                                 Flush 100ml/hr


     Kcal                                        1,584


     Protein (gm)                                79


     Fluid (mL)                                  1,063


     Goal #1                                     Meet at least 80% of kcal and


                                                 protein needs via TF


     Anticipated Discharge Needs:                Unable to determine at this


                                                 time


     Follow-Up By:                               08/24/20


     Additional Comments                         F/U for new TF/tolerance, BG











<JING CERRATO R - Last Filed: 08/21/20 16:36>





Assessment and Plan


Assessment and plan: 





I saw and evaluated the patient. I agree with the findings and the plan of care 

as documented in the Nurse Practitioner's~note, with the following corrections 

and additions.





Patient remains confused, altered, restrained, on TF


GI recommended TF for now, if no improvement possible PEG next week


cont supportive care





 





Hospitalist Physical





- Constitutional


Vitals: 


                                        











Temp Pulse Resp BP Pulse Ox


 


 98.7 F   76   20   133/64   99 


 


 08/21/20 10:54  08/21/20 10:54  08/21/20 10:54  08/21/20 10:54  08/21/20 10:54














Results





- Labs


CBC & Chem 7: 


                                 08/21/20 04:50





                                 08/21/20 04:50


Labs: 


                             Laboratory Last Values











WBC  16.5 K/mm3 (4.5-11.0)  H  08/21/20  04:50    


 


RBC  4.04 M/mm3 (3.65-5.03)   08/21/20  04:50    


 


Hgb  11.7 gm/dl (10.1-14.3)   08/21/20  04:50    


 


Hct  35.9 % (30.3-42.9)   08/21/20  04:50    


 


MCV  89 fl (79-97)   08/21/20  04:50    


 


MCH  29 pg (28-32)   08/21/20  04:50    


 


MCHC  33 % (30-34)   08/21/20  04:50    


 


RDW  15.9 % (13.2-15.2)  H  08/21/20  04:50    


 


Plt Count  189 K/mm3 (140-440)   08/21/20  04:50    


 


Lymph % (Auto)  10.6 % (13.4-35.0)  L  08/13/20  22:44    


 


Mono % (Auto)  9.8 % (0.0-7.3)  H  08/13/20  22:44    


 


Eos % (Auto)  0.0 % (0.0-4.3)   08/13/20  22:44    


 


Baso % (Auto)  1.2 % (0.0-1.8)   08/13/20  22:44    


 


Lymph #  1.5 K/mm3 (1.2-5.4)   08/13/20  22:44    


 


Mono #  1.3 K/mm3 (0.0-0.8)  H  08/13/20  22:44    


 


Eos #  0.0 K/mm3 (0.0-0.4)   08/13/20  22:44    


 


Baso #  0.2 K/mm3 (0.0-0.1)  H  08/13/20  22:44    


 


Add Manual Diff  Complete   08/21/20  04:50    


 


Total Counted  100   08/21/20  04:50    


 


Seg Neutrophils %  78.4 % (40.0-70.0)  H  08/13/20  22:44    


 


Seg Neuts % (Manual)  87.0 % (40.0-70.0)  H  08/21/20  04:50    


 


Band Neutrophils %  4.0 %  08/21/20  04:50    


 


Lymphocytes % (Manual)  2.0 % (13.4-35.0)  L  08/21/20  04:50    


 


Reactive Lymphs % (Man)  0 %  08/21/20  04:50    


 


Monocytes % (Manual)  6.0 % (0.0-7.3)   08/21/20  04:50    


 


Eosinophils % (Manual)  0 % (0.0-4.3)   08/21/20  04:50    


 


Basophils % (Manual)  0 % (0.0-1.8)   08/21/20  04:50    


 


Metamyelocytes %  1.0 %  08/21/20  04:50    


 


Myelocytes %  0 %  08/21/20  04:50    


 


Promyelocytes %  0 %  08/21/20  04:50    


 


Blast Cells %  0 %  08/21/20  04:50    


 


Nucleated RBC %  1.0 % (0.0-0.9)  H  08/21/20  04:50    


 


Seg Neutrophils #  10.8 K/mm3 (1.8-7.7)  H  08/13/20  22:44    


 


Seg Neutrophils # Man  14.4 K/mm3 (1.8-7.7)  H  08/21/20  04:50    


 


Band Neutrophils #  0.7 K/mm3  08/21/20  04:50    


 


Lymphocytes # (Manual)  0.3 K/mm3 (1.2-5.4)  L  08/21/20  04:50    


 


Abs React Lymphs (Man)  0.0 K/mm3  08/21/20  04:50    


 


Monocytes # (Manual)  1.0 K/mm3 (0.0-0.8)  H  08/21/20  04:50    


 


Eosinophils # (Manual)  0.0 K/mm3 (0.0-0.4)   08/21/20  04:50    


 


Basophils # (Manual)  0.0 K/mm3 (0.0-0.1)   08/21/20  04:50    


 


Metamyelocytes #  0.2 K/mm3  08/21/20  04:50    


 


Myelocytes #  0.0 K/mm3  08/21/20  04:50    


 


Promyelocytes #  0.0 K/mm3  08/21/20  04:50    


 


Blast Cells #  0.0 K/mm3  08/21/20  04:50    


 


WBC Morphology  Not Reportable   08/21/20  04:50    


 


Hypersegmented Neuts  Not Reportable   08/21/20  04:50    


 


Hyposegmented Neuts  Not Reportable   08/21/20  04:50    


 


Hypogranular Neuts  Not Reportable   08/21/20  04:50    


 


Smudge Cells  Not Reportable   08/21/20  04:50    


 


Toxic Granulation  Not Reportable   08/21/20  04:50    


 


Toxic Vacuolation  Not Reportable   08/21/20  04:50    


 


Dohle Bodies  Not Reportable   08/21/20  04:50    


 


Pelger-Huet Anomaly  Not Reportable   08/21/20  04:50    


 


Arnie Rods  Not Reportable   08/21/20  04:50    


 


Platelet Estimate  Consistent w auto   08/21/20  04:50    


 


Clumped Platelets  Not Reportable   08/21/20  04:50    


 


Plt Clumps, EDTA  Not Reportable   08/21/20  04:50    


 


Large Platelets  Not Reportable   08/21/20  04:50    


 


Giant Platelets  Not Reportable   08/21/20  04:50    


 


Platelet Satelliting  Not Reportable   08/21/20  04:50    


 


Plt Morphology Comment  Not Reportable   08/21/20  04:50    


 


RBC Morphology  Not Reportable   08/21/20  04:50    


 


Dimorphic RBCs  Not Reportable   08/21/20  04:50    


 


Polychromasia  Not Reportable   08/21/20  04:50    


 


Hypochromasia  Not Reportable   08/21/20  04:50    


 


Poikilocytosis  Not Reportable   08/21/20  04:50    


 


Anisocytosis  Not Reportable   08/21/20  04:50    


 


Microcytosis  Not Reportable   08/21/20  04:50    


 


Macrocytosis  1+   08/21/20  04:50    


 


Spherocytes  Not Reportable   08/21/20  04:50    


 


Pappenheimer Bodies  Not Reportable   08/21/20  04:50    


 


Sickle Cells  Not Reportable   08/21/20  04:50    


 


Target Cells  1+   08/21/20  04:50    


 


Tear Drop Cells  Not Reportable   08/21/20  04:50    


 


Ovalocytes  Not Reportable   08/21/20  04:50    


 


Helmet Cells  Not Reportable   08/21/20  04:50    


 


Mancera-[place] Bodies  Not Reportable   08/21/20  04:50    


 


Cabot Rings  Not Reportable   08/21/20  04:50    


 


Jd Cells  Not Reportable   08/21/20  04:50    


 


Bite Cells  Not Reportable   08/21/20  04:50    


 


Crenated Cell  Not Reportable   08/21/20  04:50    


 


Elliptocytes  Not Reportable   08/21/20  04:50    


 


Acanthocytes (Spur)  Not Reportable   08/21/20  04:50    


 


Rouleaux  Not Reportable   08/21/20  04:50    


 


Hemoglobin C Crystals  Not Reportable   08/21/20  04:50    


 


Schistocytes  Not Reportable   08/21/20  04:50    


 


Malaria parasites  Not Reportable   08/21/20  04:50    


 


[place]akota Bodies  Not Reportable   08/21/20  04:50    


 


Hem Pathologist Commnt  No   08/21/20  04:50    


 


PT  14.9 Sec. (12.2-14.9)   08/20/20  15:23    


 


INR  1.15  (0.87-1.13)  H  08/20/20  15:23    


 


APTT  29.5 Sec. (24.2-36.6)   08/20/20  15:23    


 


D-Dimer  674.88 ng/mlDDU (0-234)  H  08/20/20  06:14    


 


Sodium  144 mmol/L (137-145)   08/21/20  04:50    


 


Potassium  4.1 mmol/L (3.6-5.0)   08/21/20  04:50    


 


Chloride  105.8 mmol/L ()   08/21/20  04:50    


 


Carbon Dioxide  27 mmol/L (22-30)   08/21/20  04:50    


 


Anion Gap  15 mmol/L  08/21/20  04:50    


 


BUN  19 mg/dL (7-17)  H  08/21/20  04:50    


 


Creatinine  0.7 mg/dL (0.6-1.2)   08/21/20  04:50    


 


Estimated GFR  > 60 ml/min  08/21/20  04:50    


 


BUN/Creatinine Ratio  27 %  08/21/20  04:50    


 


Glucose  334 mg/dL ()  H  08/21/20  04:50    


 


POC Glucose  311  ()  H  08/21/20  15:36    


 


Lactic Acid  1.30 mmol/L (0.7-2.0)   08/13/20  22:44    


 


Calcium  7.9 mg/dL (8.4-10.2)  L  08/21/20  04:50    


 


Ferritin  1445.0 ng/mL (10.0-200.0)  H  08/20/20  06:14    


 


Total Bilirubin  0.40 mg/dL (0.1-1.2)   08/18/20  08:32    


 


Direct Bilirubin  0.3 mg/dL (0-0.2)  H  08/13/20  22:44    


 


Indirect Bilirubin  0.2 mg/dL  08/13/20  22:44    


 


AST  149 units/L (5-40)  H  08/18/20  08:32    


 


ALT  96 units/L (7-56)  H  08/18/20  08:32    


 


Alkaline Phosphatase  40 units/L ()   08/18/20  08:32    


 


Lactate Dehydrogenase  812 units/L ()  H  08/20/20  06:14    


 


Total Creatine Kinase  934 units/L ()  H  08/18/20  08:32    


 


C-Reactive Protein  1.70 mg/dL (0.00-1.30)  H  08/20/20  06:14    


 


Total Protein  6.1 g/dL (6.3-8.2)  L  08/18/20  08:32    


 


Albumin  2.5 g/dL (3.9-5)  L  08/18/20  08:32    


 


Albumin/Globulin Ratio  0.7 %  08/18/20  08:32    


 


Procalcitonin  0.71 ng/mL (<0.15)   08/18/20  08:32    


 


Urine Color  Tiffany  (Yellow)   08/14/20  Unknown


 


Urine Turbidity  Slightly-cloudy  (Clear)   08/14/20  Unknown


 


Urine pH  5.0  (5.0-7.0)   08/14/20  Unknown


 


Ur Specific Gravity  1.026  (1.003-1.030)   08/14/20  Unknown


 


Urine Protein  100 mg/dl mg/dL (Negative)   08/14/20  Unknown


 


Urine Glucose (UA)  Neg mg/dL (Negative)   08/14/20  Unknown


 


Urine Ketones  Tr mg/dL (Negative)   08/14/20  Unknown


 


Urine Blood  Lg  (Negative)   08/14/20  Unknown


 


Urine Nitrite  Neg  (Negative)   08/14/20  Unknown


 


Urine Bilirubin  Neg  (Negative)   08/14/20  Unknown


 


Urine Urobilinogen  2.0 mg/dL (<2.0)   08/14/20  Unknown


 


Ur Leukocyte Esterase  Neg  (Negative)   08/14/20  Unknown


 


Urine WBC (Auto)  17.0 /HPF (0.0-6.0)  H  08/14/20  Unknown


 


Urine RBC (Auto)  1.0 /HPF (0.0-6.0)   08/14/20  Unknown


 


U Epithel Cells (Auto)  4.0 /HPF (0-13.0)   08/14/20  Unknown


 


Urine Mucus  2+ /HPF  08/14/20  Unknown


 


Salicylates  < 0.3 mg/dL (2.8-20.0)  L  08/13/20  22:44    


 


Urine Opiates Screen  Presumptive negative   08/14/20  Unknown


 


Urine Methadone Screen  Presumptive negative   08/14/20  Unknown


 


Acetaminophen  5.0 ug/mL (10.0-30.0)  L  08/13/20  22:44    


 


Ur Barbiturates Screen  Presumptive negative   08/14/20  Unknown


 


Valproic Acid  31.8 ug/mL ()  L  08/17/20  08:46    


 


Ur Phencyclidine Scrn  Presumptive negative   08/14/20  Unknown


 


Ur Amphetamines Screen  Presumptive negative   08/14/20  Unknown


 


U Benzodiazepines Scrn  Presumptive negative   08/14/20  Unknown


 


Urine Cocaine Screen  Presumptive negative   08/14/20  Unknown


 


U Marijuana (THC) Screen  Presumptive negative   08/14/20  Unknown


 


Drugs of Abuse Note  Disclamer   08/14/20  Unknown


 


Plasma/Serum Alcohol  < 0.01 % (0-0.07)   08/13/20  22:44    


 


Coronavirus (PCR)  Positive  (Negative)  A  08/15/20  Unknown











Schmidt/IV: 


                                        





Voiding Method                   External Female Catheter


IV Catheter Type [Right Upper    Peripheral IV


arm]                             


IV Catheter Type [Right          INT / Saline Lock


Antecubital]                     


IV Catheter Type [Right Foot]    INT / Saline Lock


IV Catheter Type [Right          Peripheral IV


External Jugular]                











Active Medications





- Current Medications


Current Medications: 














Generic Name Dose Route Start Last Admin





  Trade Name Freq  PRN Reason Stop Dose Admin


 


Acetaminophen  650 mg  08/14/20 08:00  08/21/20 12:10





  Tylenol  PO   650 mg





  Q4H PRN   Administration





  Pain MILD(1-3)/Fever >100.5/HA  


 


Lipase/Protease/Amylase  1 each  08/17/20 10:34 





  Pancremarilin Clay 10,500 Unit  FEEDTUBE  





  PRN PRN  





  For Clogged Feeding Tube  


 


Enoxaparin Sodium  40 mg  08/17/20 22:00  08/21/20 09:22





  Enoxaparin  SUB-Q   40 mg





  BID KATHYA   Administration


 


Haloperidol  5 mg  08/15/20 12:00  08/21/20 09:27





  Haldol  PO   Not Given





  BID KATHYA  


 


Hydralazine HCl  10 mg  08/19/20 00:38  08/20/20 00:09





  Apresoline  IV   10 mg





  Q6HR PRN   Administration





  Hypertension  


 


Sodium Chloride  1,000 mls @ 75 mls/hr  08/19/20 12:00  08/21/20 06:05





  Nacl 0.45% 1000 Ml  IV   75 mls/hr





  AS DIRECT KATHYA   Administration


 


Insulin Human Lispro  0 unit  08/19/20 01:00  08/21/20 12:02





  Humalog  SUB-Q   6 unit





  Q6HR KATHYA   Administration





  Protocol  


 


Methylprednisolone Sodium Succinate  40 mg  08/20/20 10:00  08/21/20 09:23





  Solu-Medrol  IV   40 mg





  Q24HR KATHYA   Administration


 


Miscellaneous Medication  325 mg  08/15/20 18:45 





  Paliperidone Palmitate [Invega Sustenna]  IM  





  Q3W KATHYA  


 


Ondansetron HCl  4 mg  08/14/20 08:00 





  Zofran  IV  





  Q8H PRN  





  Nausea And Vomiting  


 


Simple Syrup  15 ml  08/17/20 10:34 





  Simple Syrup  FEEDTUBE  





  PRN PRN  





  Hypoglycemia  


 


Simple Syrup  30 ml  08/17/20 10:34 





  Simple Syrup  FEEDTUBE  





  PRN PRN  





  Hypoglycemia  


 


Sodium Bicarbonate  325 mg  08/17/20 10:34 





  Sodium Bicarbonate  FEEDTUBE  





  PRN PRN  





  For Clogged Feeding Tube  


 


Sodium Chloride  10 ml  08/14/20 10:00  08/21/20 09:23





  Sodium Chloride Flush Syringe 10 Ml  IV   10 ml





  BID KATHYA   Administration


 


Sodium Chloride  10 ml  08/14/20 07:50 





  Sodium Chloride Flush Syringe 10 Ml  IV  08/27/20 07:49 





  PRN PRN  





  LINE FLUSH  


 


Trazodone HCl  50 mg  08/14/20 22:00  08/20/20 21:26





  Desyrel  PO   50 mg





  QHS KATHYA   Administration


 


Valproic Acid  1,500 mg  08/18/20 22:00  08/20/20 21:26





  Depakene Liq  PO   1,500 mg





  QHS KATHYA   Administration














Nutrition/Malnutrition Assess





- Dietary Evaluation


Nutrition/Malnutrition Findings: 


                                        





Nutrition Notes                                            Start:  08/17/20 

09:33


Freq:                                                      Status: Active       




Protocol:                                                                       




 Document     08/21/20 12:14  LM  (Rec: 08/21/20 12:22  LM  [id number])


 Nutrition Notes


     Initial or Follow up                        Reassessment


     Current Diagnosis                           Acute Kidney Injury,Sepsis


     Other Pertinent Diagnosis                   COVID-19 (+), Schizoaffective


                                                 disorder


     Current Diet                                Jevity 1.2 at 55ml/hr


     Labs/Tests                                  POC glu 311


     Pertinent Medications                       Solumedrol


                                                 Humalog


                                                 NS at 75ml/hr


     Height                                      5 ft 6 in


     Weight                                      114 kg


     Ideal Body Weight (kg)                      59.09


     BMI                                         40.5


     Weight Status                               Morbidly Obese


     Subjective/Other Information                TF running at goal and pt is


                                                 tolerating. BG remains


                                                 elevated. Will change TF.


     Burn                                        Absent


     Trauma                                      Absent


     GI Symptoms                                 None


     Difficulty In                               Swallowing


     Food Allergy                                No


     Current % PO                                Negligible


     Minimum of two criteria                     No


     Reduced  Strength                       Measurably Reduced (severe)


     #1


      Nutrition Diagnosis                        Inadequate oral intake


      Diagnosis Progress(for reassessment        Continues


       documentation)                            


     Is patient on ventilator?                   No


     Is Patient Ambulatory and/or Out of Bed     No


     REE-([facility]-confined to bed)      2106.012


     Kcal/Kg value to use for calculation        14


     Approximate Energy Requirements Using       1596


      kcal/Kg                                    


     Calculation Used for Recommendations        Kcal/kg


     Additional Notes                            Protein needs are 69-87g (0.8-


                                                 1g/kg adjusted wt 86.5kg)


                                                 Fluid needs are 1ml/kcal


 Nutrition Intervention


     Change Diet Order:                          TF


     Nutrition Support:                          Change to Glucerna 1.2 at 55ml


                                                 /hr


                                                 Flush 100ml/hr


     Kcal                                        1,584


     Protein (gm)                                79


     Fluid (mL)                                  1,063


     Goal #1                                     Meet at least 80% of kcal and


                                                 protein needs via TF


     Anticipated Discharge Needs:                Unable to determine at this


                                                 time


     Follow-Up By:                               08/24/20


     Additional Comments                         F/U for new TF/tolerance, BG

## 2020-08-21 NOTE — GASTROENTEROLOGY PROGRESS NOTE
Assessment and Plan





- Patient Problems


(1) Neurogenic dysphagia


Current Visit: Yes   Status: Acute   


Plan to address problem: 


- Will continue to monitor progress as Psych meds re-added, and patient recovers

from COVID.


- If fails to improve, will need PEG, but this will be somewhat difficult with 

morbid obesity.


- Continue Dobhoff feeds and supportive care.








(2) Pneumonia due to COVID-19 virus


Current Visit: Yes   Status: Acute   





(3) Paranoid schizophrenia


Current Visit: Yes   Status: Chronic   





Subjective


Date of service: 08/21/20


Principal diagnosis: Neurogenic Dysphagia


Interval history: 





The patient is tolerating her tube feeds without event.  On psych meds she is 

minimally more interactive.





Objective





- Constitutional


Vitals: 


                                        











Temp Pulse Resp BP Pulse Ox


 


 98.7 F   76   20   133/64   99 


 


 08/21/20 10:54  08/21/20 10:54  08/21/20 10:54  08/21/20 10:54  08/21/20 10:54











General appearance: no acute distress





- Respiratory


Respiratory effort: normal


Respiratory: bilateral: CTA





- Cardiovascular


Rhythm: regular


Heart Sounds: Present: S1 & S2





- Gastrointestinal


General gastrointestinal: Present: soft, non-tender, non-distended





- Labs


CBC & Chem 7: 


                                 08/21/20 04:50





                                 08/21/20 04:50


Labs: 


                         Laboratory Results - last 24 hr











  08/21/20 08/21/20 08/21/20





  00:10 04:50 04:50


 


WBC   16.5 H 


 


RBC   4.04 


 


Hgb   11.7 


 


Hct   35.9 


 


MCV   89 


 


MCH   29 


 


MCHC   33 


 


RDW   15.9 H 


 


Plt Count   189 


 


Add Manual Diff   Complete 


 


Total Counted   100 


 


Seg Neuts % (Manual)   87.0 H 


 


Band Neutrophils %   4.0 


 


Lymphocytes % (Manual)   2.0 L 


 


Reactive Lymphs % (Man)   0 


 


Monocytes % (Manual)   6.0 


 


Eosinophils % (Manual)   0 


 


Basophils % (Manual)   0 


 


Metamyelocytes %   1.0 


 


Myelocytes %   0 


 


Promyelocytes %   0 


 


Blast Cells %   0 


 


Nucleated RBC %   1.0 H 


 


Seg Neutrophils # Man   14.4 H 


 


Band Neutrophils #   0.7 


 


Lymphocytes # (Manual)   0.3 L 


 


Abs React Lymphs (Man)   0.0 


 


Monocytes # (Manual)   1.0 H 


 


Eosinophils # (Manual)   0.0 


 


Basophils # (Manual)   0.0 


 


Metamyelocytes #   0.2 


 


Myelocytes #   0.0 


 


Promyelocytes #   0.0 


 


Blast Cells #   0.0 


 


WBC Morphology   Not Reportable 


 


Hypersegmented Neuts   Not Reportable 


 


Hyposegmented Neuts   Not Reportable 


 


Hypogranular Neuts   Not Reportable 


 


Smudge Cells   Not Reportable 


 


Toxic Granulation   Not Reportable 


 


Toxic Vacuolation   Not Reportable 


 


Dohle Bodies   Not Reportable 


 


Pelger-Huet Anomaly   Not Reportable 


 


Arnie Rods   Not Reportable 


 


Platelet Estimate   Consistent w auto 


 


Clumped Platelets   Not Reportable 


 


Plt Clumps, EDTA   Not Reportable 


 


Large Platelets   Not Reportable 


 


Giant Platelets   Not Reportable 


 


Platelet Satelliting   Not Reportable 


 


Plt Morphology Comment   Not Reportable 


 


RBC Morphology   Not Reportable 


 


Dimorphic RBCs   Not Reportable 


 


Polychromasia   Not Reportable 


 


Hypochromasia   Not Reportable 


 


Poikilocytosis   Not Reportable 


 


Anisocytosis   Not Reportable 


 


Microcytosis   Not Reportable 


 


Macrocytosis   1+ 


 


Spherocytes   Not Reportable 


 


Pappenheimer Bodies   Not Reportable 


 


Sickle Cells   Not Reportable 


 


Target Cells   1+ 


 


Tear Drop Cells   Not Reportable 


 


Ovalocytes   Not Reportable 


 


Helmet Cells   Not Reportable 


 


Mancera-[place] Bodies   Not Reportable 


 


Cabot Rings   Not Reportable 


 


[place] Cells   Not Reportable 


 


Bite Cells   Not Reportable 


 


Crenated Cell   Not Reportable 


 


Elliptocytes   Not Reportable 


 


Acanthocytes (Spur)   Not Reportable 


 


Rouleaux   Not Reportable 


 


Hemoglobin C Crystals   Not Reportable 


 


Schistocytes   Not Reportable 


 


Malaria parasites   Not Reportable 


 


[place] Bodies   Not Reportable 


 


Hem Pathologist Commnt   No 


 


Sodium    144


 


Potassium    4.1


 


Chloride    105.8


 


Carbon Dioxide    27


 


Anion Gap    15


 


BUN    19 H


 


Creatinine    0.7


 


Estimated GFR    > 60


 


BUN/Creatinine Ratio    27


 


Glucose    334 H


 


POC Glucose  349 H  


 


Calcium    7.9 L














  08/21/20 08/21/20 08/21/20





  06:13 11:41 15:36


 


WBC   


 


RBC   


 


Hgb   


 


Hct   


 


MCV   


 


MCH   


 


MCHC   


 


RDW   


 


Plt Count   


 


Add Manual Diff   


 


Total Counted   


 


Seg Neuts % (Manual)   


 


Band Neutrophils %   


 


Lymphocytes % (Manual)   


 


Reactive Lymphs % (Man)   


 


Monocytes % (Manual)   


 


Eosinophils % (Manual)   


 


Basophils % (Manual)   


 


Metamyelocytes %   


 


Myelocytes %   


 


Promyelocytes %   


 


Blast Cells %   


 


Nucleated RBC %   


 


Seg Neutrophils # Man   


 


Band Neutrophils #   


 


Lymphocytes # (Manual)   


 


Abs React Lymphs (Man)   


 


Monocytes # (Manual)   


 


Eosinophils # (Manual)   


 


Basophils # (Manual)   


 


Metamyelocytes #   


 


Myelocytes #   


 


Promyelocytes #   


 


Blast Cells #   


 


WBC Morphology   


 


Hypersegmented Neuts   


 


Hyposegmented Neuts   


 


Hypogranular Neuts   


 


Smudge Cells   


 


Toxic Granulation   


 


Toxic Vacuolation   


 


Dohle Bodies   


 


Pelger-Huet Anomaly   


 


Arnie Rods   


 


Platelet Estimate   


 


Clumped Platelets   


 


Plt Clumps, EDTA   


 


Large Platelets   


 


Giant Platelets   


 


Platelet Satelliting   


 


Plt Morphology Comment   


 


RBC Morphology   


 


Dimorphic RBCs   


 


Polychromasia   


 


Hypochromasia   


 


Poikilocytosis   


 


Anisocytosis   


 


Microcytosis   


 


Macrocytosis   


 


Spherocytes   


 


Pappenheimer Bodies   


 


Sickle Cells   


 


Target Cells   


 


Tear Drop Cells   


 


Ovalocytes   


 


Helmet Cells   


 


Mancera-[place] Bodies   


 


Cabot Rings   


 


[place] Cells   


 


Bite Cells   


 


Crenated Cell   


 


Elliptocytes   


 


Acanthocytes (Spur)   


 


Rouleaux   


 


Hemoglobin C Crystals   


 


Schistocytes   


 


Malaria parasites   


 


[place] Bodies   


 


Hem Pathologist Commnt   


 


Sodium   


 


Potassium   


 


Chloride   


 


Carbon Dioxide   


 


Anion Gap   


 


BUN   


 


Creatinine   


 


Estimated GFR   


 


BUN/Creatinine Ratio   


 


Glucose   


 


POC Glucose  273 H  311 H  311 H


 


Calcium

## 2020-08-22 LAB
CRP SERPL-MCNC: 0.8 MG/DL (ref 0–1.3)
HCT VFR BLD CALC: 38.8 % (ref 30.3–42.9)
HGB BLD-MCNC: 12.7 GM/DL (ref 10.1–14.3)
MCHC RBC AUTO-ENTMCNC: 33 % (ref 30–34)
MCV RBC AUTO: 89 FL (ref 79–97)
PLATELET # BLD: 197 K/MM3 (ref 140–440)
RBC # BLD AUTO: 4.38 M/MM3 (ref 3.65–5.03)

## 2020-08-22 RX ADMIN — INSULIN LISPRO SCH UNIT: 100 INJECTION, SOLUTION INTRAVENOUS; SUBCUTANEOUS at 13:07

## 2020-08-22 RX ADMIN — HALOPERIDOL SCH MG: 5 TABLET ORAL at 22:22

## 2020-08-22 RX ADMIN — ENOXAPARIN SODIUM SCH MG: 100 INJECTION SUBCUTANEOUS at 22:21

## 2020-08-22 RX ADMIN — METHYLPREDNISOLONE SODIUM SUCCINATE SCH MG: 40 INJECTION, POWDER, FOR SOLUTION INTRAMUSCULAR; INTRAVENOUS at 13:07

## 2020-08-22 RX ADMIN — INSULIN LISPRO SCH UNIT: 100 INJECTION, SOLUTION INTRAVENOUS; SUBCUTANEOUS at 23:21

## 2020-08-22 RX ADMIN — INSULIN LISPRO SCH UNIT: 100 INJECTION, SOLUTION INTRAVENOUS; SUBCUTANEOUS at 06:28

## 2020-08-22 RX ADMIN — INSULIN LISPRO SCH UNIT: 100 INJECTION, SOLUTION INTRAVENOUS; SUBCUTANEOUS at 17:21

## 2020-08-22 RX ADMIN — SODIUM CHLORIDE SCH MLS/HR: 0.45 INJECTION, SOLUTION INTRAVENOUS at 06:33

## 2020-08-22 RX ADMIN — SODIUM CHLORIDE SCH MLS/HR: 0.45 INJECTION, SOLUTION INTRAVENOUS at 17:22

## 2020-08-22 RX ADMIN — Medication SCH ML: at 13:10

## 2020-08-22 RX ADMIN — HALOPERIDOL SCH MG: 5 TABLET ORAL at 13:08

## 2020-08-22 RX ADMIN — Medication SCH ML: at 22:23

## 2020-08-22 RX ADMIN — VALPROIC ACID SCH MG: 250 SOLUTION ORAL at 22:23

## 2020-08-22 RX ADMIN — ENOXAPARIN SODIUM SCH MG: 100 INJECTION SUBCUTANEOUS at 13:08

## 2020-08-22 NOTE — XRAY REPORT
ABDOMEN 1 VIEW(S)



INDICATION / CLINICAL INFORMATION:

Verify Dobhoff tube placement.



COMPARISON: 

8/16/2020



FINDINGS:



TUBES / LINES: The tip of the weighted enteric feeding tube is in the proximal stomach. Previously se
en esophagogastric tube is been removed.

BOWEL GAS PATTERN: No significant abnormality. 

FREE AIR / EXTRALUMINAL GAS: None seen.



ADDITIONAL FINDINGS: No significant additional findings.



IMPRESSION:

1. Feeding tube tip is in the proximal stomach.



Signer Name: Chip Stein MD 

Signed: 8/22/2020 11:17 AM

Workstation Name: [id number]

## 2020-08-22 NOTE — PROGRESS NOTE
<MANI HARRIS - Last Filed: 08/22/20 14:14>





Assessment and Plan








- Patient Problems


(1) Severe sepsis


Current Visit: Yes   Status: Resolved   


Plan to address problem: 


Presented with low-grade fever, tachycardia, leukocytosis, WHITNEY, and bilateral 

pneumonia


Likely source is bilateral pneumonia secondary to COVID-19


Patient has completed antibiotic therapy





(2) Pneumonia due to COVID-19 virus


Current Visit: Yes   Status: Acute   


Plan to address problem: 


COVID protocol initiated


IV azithromycin and Rocephin completed


8/14 COVID PCR positive


Elevated inflammatory makers LDH, CRP, procalcitonin, d-dimer, ferritin 


Continue airborne and contact precautions


Continue Supplemental oxygenation as needed


Infectious disease consult requested


Respiratory care ABG and Pulmonary hygiene


CTA Chest was ordered which was negative for pulmonary embolism


Continue Anticoagulation per COVID protocol


Cannot initiate Remdesivir in setting on of elevated LFTs


Continue p.o. dexamethasone








(3) Acute psychosis


Current Visit: Yes   Status: Acute   


Plan to address problem: 


Currently withdraws to pain however moving all extremities spontaneously


History of paranoid schizophrenia


Mental health consult requested and does not recommend inpatient psych at this 

time as medical needs outweigh psych this time


Requested to reconsult when patient is medically stable


GI consulted for PEG tube placement to facilitate placement








(4) Paranoid schizophrenia


Current Visit: Yes   Status: Chronic   


Plan to address problem: 


History of present schizophrenia


Psych consulted and appreciate recommendations


Depakote and Haldol 


Reorientation as needed








(5) Leukocytosis likely 2/2 PNA/ steroid induced


Current Visit: Yes   Status: Acute   


Plan to address problem: 


Admit WBC 13.8


Trend CBC-19 today











Subjective


Date of service: 08/22/20


Principal diagnosis: Neurogenic Dysphagia


Interval history: 





Patient seen at the bedside. reviewed lab, mar, and v/s. Patient on continuos 

feeding


GI on board-pt needs Peg tube for long term nutrition and hydration. Patient 

appears to be total care.


On supplemental oxygen. patient will benefit from Psych/physical rehab


Elevated leucocytosis-likely 2/2 Infection/Steroid induced-pt on dexamethosone.





Objective





- Constitutional


Vitals: 


                               Vital Signs - 12hr











  08/21/20 08/22/20 08/22/20





  23:25 05:37 08:31


 


Temperature  97.8 F 


 


Pulse Rate  88 


 


Respiratory  20 





Rate   


 


Blood Pressure  149/82 


 


O2 Sat by Pulse 96 97 100





Oximetry   














  08/22/20





  09:11


 


Temperature 


 


Pulse Rate 


 


Respiratory 





Rate 


 


Blood Pressure 


 


O2 Sat by Pulse 99





Oximetry 











General appearance: Present: obese





- Respiratory


Respiratory effort: other (On oxygen per N/C)


Respiratory: bilateral: diminished





- Breasts


Breasts: normal





- Cardiovascular


Heart rate: 78


Rhythm: regular


Heart Sounds: Present: S1 & S2.  Absent: gallop, rub





- Gastrointestinal


General gastrointestinal: Present: soft, non-tender, distended, normal bowel 

sounds, other (Patient on tube feeding-Jevity)





- Psychiatric


Psychiatric: other (Patient will hx phycosis and schizophrenia-need psych unit 

when medically stable)





- Labs


CBC & Chem 7: 


                                 08/22/20 09:32





                                 08/21/20 04:50


Labs: 


                              Abnormal lab results











  08/21/20 08/21/20 08/21/20 Range/Units





  11:41 15:36 22:53 


 


WBC     (4.5-11.0)  K/mm3


 


RDW     (13.2-15.2)  %


 


D-Dimer     (0-234)  ng/mlDDU


 


POC Glucose  311 H  311 H  290 H  ()  


 


Lactate Dehydrogenase     ()  units/L














  08/22/20 08/22/20 08/22/20 Range/Units





  06:40 06:57 09:32 


 


WBC     (4.5-11.0)  K/mm3


 


RDW     (13.2-15.2)  %


 


D-Dimer    390.46 H  (0-234)  ng/mlDDU


 


POC Glucose  196 H  192 H   ()  


 


Lactate Dehydrogenase     ()  units/L














  08/22/20 08/22/20 Range/Units





  09:32 09:32 


 


WBC   19.6 H  (4.5-11.0)  K/mm3


 


RDW   15.5 H  (13.2-15.2)  %


 


D-Dimer    (0-234)  ng/mlDDU


 


POC Glucose    ()  


 


Lactate Dehydrogenase  716 H   ()  units/L














HEART Score





- HEART Score


History: Moderately suspicious





<SAQIB,DIMPLE R - Last Filed: 08/22/20 14:53>





Assessment and Plan





I saw and evaluated the patient. I agree with the findings and the plan of care 

as documented in the Nurse Practitioner's~note. 








Objective





- Constitutional


Vitals: 


                               Vital Signs - 12hr











  08/22/20 08/22/20 08/22/20





  05:37 08:31 09:11


 


Temperature 97.8 F  


 


Pulse Rate 88  


 


Respiratory 20  





Rate   


 


Blood Pressure 149/82  


 


O2 Sat by Pulse 97 100 99





Oximetry   














  08/22/20





  11:28


 


Temperature 98.5 F


 


Pulse Rate 109 H


 


Respiratory 20





Rate 


 


Blood Pressure 173/97


 


O2 Sat by Pulse 96





Oximetry 














- Labs


CBC & Chem 7: 


                                 08/22/20 09:32





                                 08/21/20 04:50


Labs: 


                              Abnormal lab results











  08/21/20 08/21/20 08/22/20 Range/Units





  15:36 22:53 06:40 


 


WBC     (4.5-11.0)  K/mm3


 


RDW     (13.2-15.2)  %


 


D-Dimer     (0-234)  ng/mlDDU


 


POC Glucose  311 H  290 H  196 H  ()  


 


Ferritin     (10.0-200.0)  ng/mL


 


Lactate Dehydrogenase     ()  units/L














  08/22/20 08/22/20 08/22/20 Range/Units





  06:57 09:32 09:32 


 


WBC     (4.5-11.0)  K/mm3


 


RDW     (13.2-15.2)  %


 


D-Dimer   390.46 H   (0-234)  ng/mlDDU


 


POC Glucose  192 H    ()  


 


Ferritin    991.6 H  (10.0-200.0)  ng/mL


 


Lactate Dehydrogenase     ()  units/L














  08/22/20 08/22/20 08/22/20 Range/Units





  09:32 09:32 12:07 


 


WBC   19.6 H   (4.5-11.0)  K/mm3


 


RDW   15.5 H   (13.2-15.2)  %


 


D-Dimer     (0-234)  ng/mlDDU


 


POC Glucose    171 H  ()  


 


Ferritin     (10.0-200.0)  ng/mL


 


Lactate Dehydrogenase  716 H    ()  units/L

## 2020-08-22 NOTE — GASTROENTEROLOGY PROGRESS NOTE
Assessment and Plan





- Patient Problems


(1) Neurogenic dysphagia


Current Visit: Yes   Status: Acute   


Plan to address problem: 


- Tolerating tube feeds via Dobhoff. 


- monitor mental status changes for any improvement with psych medications on 

board and recovering from COVID 19


- If fails to improve, will need PEG, but this will be somewhat difficult with 

morbid obesity.


- Continue Dobhoff feeds and supportive care.








Subjective


Date of service: 08/22/20


Principal diagnosis: Neurogenic Dysphagia


Interval history: 





Patient tolerating tube feeds per nursing. 


Patient is COVID positive, chart reviewed. Patient not examined to conserve 

resources on PPE in this pandemic and to reduce the risk of exposure and or 

transmission of the disease.





Objective





- Exam


Narrative Exam: 





Patient is COVID positive, chart reviewed. Patient not examined to conserve 

resources on PPE in this pandemic and to reduce the risk of exposure and or 

transmission of the disease.





- Constitutional


Vitals: 


                                        











Temp Pulse Resp BP Pulse Ox


 


 97.9 F   91 H  20   161/99   97 


 


 08/22/20 16:30  08/22/20 16:30  08/22/20 16:30  08/22/20 16:30  08/22/20 16:30














- Labs


CBC & Chem 7: 


                                 08/22/20 09:32





                                 08/21/20 04:50


Labs: 


                         Laboratory Results - last 24 hr











  08/21/20 08/22/20 08/22/20





  22:53 06:40 06:57


 


WBC   


 


RBC   


 


Hgb   


 


Hct   


 


MCV   


 


MCH   


 


MCHC   


 


RDW   


 


Plt Count   


 


D-Dimer   


 


POC Glucose  290 H  196 H  192 H


 


Ferritin   


 


Lactate Dehydrogenase   


 


C-Reactive Protein   














  08/22/20 08/22/20 08/22/20





  09:32 09:32 09:32


 


WBC   


 


RBC   


 


Hgb   


 


Hct   


 


MCV   


 


MCH   


 


MCHC   


 


RDW   


 


Plt Count   


 


D-Dimer  390.46 H  


 


POC Glucose   


 


Ferritin   991.6 H 


 


Lactate Dehydrogenase    716 H


 


C-Reactive Protein    0.80














  08/22/20 08/22/20 08/22/20





  09:32 12:07 16:45


 


WBC  19.6 H  


 


RBC  4.38  


 


Hgb  12.7  


 


Hct  38.8  


 


MCV  89  


 


MCH  29  


 


MCHC  33  


 


RDW  15.5 H  


 


Plt Count  197  


 


D-Dimer   


 


POC Glucose   171 H  247 H


 


Ferritin   


 


Lactate Dehydrogenase   


 


C-Reactive Protein

## 2020-08-22 NOTE — PROGRESS NOTE
Assessment and Plan


Cultures:


Blood culture and urine culture negative


COVID PCR positive


 


Assessment: 55 years old female with history of paranoid schizophrenia, admitted

on 8/13/2020 due to altered mental status and catatonia state:





#Severe sepsis: Present on admission with low-grade fever, tachycardia, elevated

leukocytosis, WHITNEY, likely due to bilateral pneumonia. 





#Bilateral pneumonia: COVID-19 pneumonia.  Inflammatory markers are elevated, 

with a d-dimer > 10,000.  CT negative for pulmonary embolism. Repeat d-dimer w

ith much improvement. Renal function and LFTs improving, but given mild hypoxia 

and several days of positivity, unclear benefit with Remdesivir at this stage. 

Completed empiric CAP abx x 5 days.





#Acute hypoxemic respiratory failure: mild. on supplemental oxygen.





#Elevated LFTs: from COVID/rhabdo





#WHITNEY: from COVID. improved





#Elevated CK: Likely secondary to rhabdomyolysis possible due to COVID-19 

infection or catatonic state





#Paranoid schizophrenia with catatonia: Per psych





Recommendations:


-continue steroids, day 8 of 10


-supportive care and oxygen weaning as tolerated


-anticoagulation per protocol based on d-dimer





Blank Coppola MD, FACP


 [facility]


C: [phone]


O: [phone]


F: [phone]





Subjective


Date of service: 08/22/20


Principal diagnosis: Neurogenic Dysphagia


Interval history: 


No fever.  Oxygen requirements have remained stable at 2 L nasal cannula.





Objective





- Exam


Narrative Exam: 





Physical Exam (reviewed in chart due to PPE conservation)


Constitutional: limited due to PPE conservation strategy


Head, Ears, Nose: limited due to PPE conservation strategy


Eyes: limited due to PPE conservation strategy


Neck: limited due to PPE conservation strategy


Oral: limited due to PPE conservation strategy


Cardiovascular: limited due to PPE conservation strategy


Respiratory: limited due to PPE conservation strategy


GI: limited due to PPE conservation strategy


Musculoskeletal: limited due to PPE conservation strategy


Skin: limited due to PPE conservation strategy


Hem/Lymphatic: limited due to PPE conservation strategy


Psych: limited due to PPE conservation strategy


Neurological: limited due to PPE conservation strategy     





- Constitutional


Vitals: 


                                   Vital Signs











Temp Pulse Resp BP Pulse Ox


 


 97.8 F   88   20   149/82   99 


 


 08/22/20 05:37  08/22/20 05:37  08/22/20 05:37  08/22/20 05:37  08/22/20 09:11








                           Temperature -Last 24 Hours











Temperature                    97.8 F


 


Temperature                    98.2 F


 


Temperature                    98.0 F

















- Labs


CBC & Chem 7: 


                                 08/22/20 09:32





                                 08/21/20 04:50


Labs: 


                              Abnormal lab results











  08/21/20 08/21/20 08/21/20 Range/Units





  11:41 15:36 22:53 


 


WBC     (4.5-11.0)  K/mm3


 


RDW     (13.2-15.2)  %


 


D-Dimer     (0-234)  ng/mlDDU


 


POC Glucose  311 H  311 H  290 H  ()  


 


Lactate Dehydrogenase     ()  units/L














  08/22/20 08/22/20 08/22/20 Range/Units





  06:40 06:57 09:32 


 


WBC     (4.5-11.0)  K/mm3


 


RDW     (13.2-15.2)  %


 


D-Dimer    390.46 H  (0-234)  ng/mlDDU


 


POC Glucose  196 H  192 H   ()  


 


Lactate Dehydrogenase     ()  units/L














  08/22/20 08/22/20 Range/Units





  09:32 09:32 


 


WBC   19.6 H  (4.5-11.0)  K/mm3


 


RDW   15.5 H  (13.2-15.2)  %


 


D-Dimer    (0-234)  ng/mlDDU


 


POC Glucose    ()  


 


Lactate Dehydrogenase  716 H   ()  units/L

## 2020-08-23 LAB
HCT VFR BLD CALC: 36.8 % (ref 30.3–42.9)
HGB BLD-MCNC: 12.3 GM/DL (ref 10.1–14.3)
MCHC RBC AUTO-ENTMCNC: 33 % (ref 30–34)
MCV RBC AUTO: 88 FL (ref 79–97)
PLATELET # BLD: 164 K/MM3 (ref 140–440)
RBC # BLD AUTO: 4.2 M/MM3 (ref 3.65–5.03)

## 2020-08-23 RX ADMIN — INSULIN LISPRO SCH UNIT: 100 INJECTION, SOLUTION INTRAVENOUS; SUBCUTANEOUS at 23:00

## 2020-08-23 RX ADMIN — INSULIN LISPRO SCH: 100 INJECTION, SOLUTION INTRAVENOUS; SUBCUTANEOUS at 16:45

## 2020-08-23 RX ADMIN — HALOPERIDOL SCH: 5 TABLET ORAL at 10:00

## 2020-08-23 RX ADMIN — SODIUM CHLORIDE SCH MLS/HR: 0.45 INJECTION, SOLUTION INTRAVENOUS at 05:18

## 2020-08-23 RX ADMIN — Medication SCH ML: at 16:45

## 2020-08-23 RX ADMIN — HALOPERIDOL SCH MG: 5 TABLET ORAL at 21:45

## 2020-08-23 RX ADMIN — Medication SCH ML: at 22:42

## 2020-08-23 RX ADMIN — ENOXAPARIN SODIUM SCH: 100 INJECTION SUBCUTANEOUS at 10:00

## 2020-08-23 RX ADMIN — INSULIN LISPRO SCH UNIT: 100 INJECTION, SOLUTION INTRAVENOUS; SUBCUTANEOUS at 06:08

## 2020-08-23 RX ADMIN — INSULIN LISPRO SCH: 100 INJECTION, SOLUTION INTRAVENOUS; SUBCUTANEOUS at 18:29

## 2020-08-23 RX ADMIN — VALPROIC ACID SCH MG: 250 SOLUTION ORAL at 21:44

## 2020-08-23 RX ADMIN — ENOXAPARIN SODIUM SCH MG: 100 INJECTION SUBCUTANEOUS at 21:44

## 2020-08-23 RX ADMIN — METHYLPREDNISOLONE SODIUM SUCCINATE SCH MG: 40 INJECTION, POWDER, FOR SOLUTION INTRAMUSCULAR; INTRAVENOUS at 16:42

## 2020-08-23 NOTE — PROGRESS NOTE
Assessment and Plan


Cultures:


Blood culture and urine culture negative


COVID PCR positive


 


Assessment: 55 years old female with history of paranoid schizophrenia, admitted

on 8/13/2020 due to altered mental status and catatonia state:





#Severe sepsis: Present on admission with low-grade fever, tachycardia, elevated

leukocytosis, WHITNEY, likely due to bilateral pneumonia. 





#Bilateral pneumonia: COVID-19 pneumonia.  Inflammatory markers are elevated, 

with a d-dimer > 10,000.  CT negative for pulmonary embolism. Repeat d-dimer w

ith much improvement. Renal function and LFTs improving, but given mild hypoxia 

and several days of positivity, unclear benefit with Remdesivir at this stage. 

Completed empiric CAP abx x 5 days.





#Acute hypoxemic respiratory failure: mild. on supplemental oxygen.





#Elevated LFTs: from COVID/rhabdo





#WHITNEY: from COVID. improved





#Elevated CK: Likely secondary to rhabdomyolysis possible due to COVID-19 

infection or catatonic state





#Paranoid schizophrenia with catatonia: Per psych





Recommendations:


-continue steroids, day 9 of 10


-supportive care and oxygen weaning as tolerated


-anticoagulation per protocol based on d-dimer which has been steadily 

improving.





Blank Coppola MD, FACP


[facility] ([facility])


C: [phone]


O: [phone]


F: [phone]





Subjective


Date of service: 08/23/20


Principal diagnosis: Neurogenic Dysphagia


Interval history: 


No fever.  Oxygen requirements stable 





Objective





- Exam


Narrative Exam: 





Physical Exam (reviewed in chart due to PPE conservation)


Constitutional: limited due to PPE conservation strategy


Head, Ears, Nose: limited due to PPE conservation strategy


Eyes: limited due to PPE conservation strategy


Neck: limited due to PPE conservation strategy


Oral: limited due to PPE conservation strategy


Cardiovascular: limited due to PPE conservation strategy


Respiratory: limited due to PPE conservation strategy


GI: limited due to PPE conservation strategy


Musculoskeletal: limited due to PPE conservation strategy


Skin: limited due to PPE conservation strategy


Hem/Lymphatic: limited due to PPE conservation strategy


Psych: limited due to PPE conservation strategy


Neurological: limited due to PPE conservation strategy      





- Constitutional


Vitals: 


                                   Vital Signs











Temp Pulse Resp BP Pulse Ox


 


 97.9 F   112 H  19   168/85   93 


 


 08/23/20 11:55  08/23/20 11:55  08/23/20 11:55  08/23/20 11:55  08/23/20 11:55








                           Temperature -Last 24 Hours











Temperature                    97.9 F


 


Temperature                    98.6 F


 


Temperature                    99.1 F


 


Temperature                    97.9 F

















- Labs


CBC & Chem 7: 


                                 08/23/20 10:50





                                 08/21/20 04:50


Labs: 


                              Abnormal lab results











  08/22/20 08/22/20 08/23/20 Range/Units





  16:45 23:03 06:22 


 


WBC     (4.5-11.0)  K/mm3


 


POC Glucose  247 H  282 H  166 H  ()  














  08/23/20 08/23/20 Range/Units





  10:50 12:12 


 


WBC  22.4 H   (4.5-11.0)  K/mm3


 


POC Glucose   167 H  ()

## 2020-08-23 NOTE — PROGRESS NOTE
<YENNIEFRITZLEMANI LIZARRAGA - Last Filed: 08/23/20 14:14>





Assessment and Plan








- Patient Problems


(1) Severe sepsis


Current Visit: Yes   Status: Resolved   


Plan to address problem: 


Presented with low-grade fever, tachycardia, leukocytosis, WHITNEY, and bilateral 

pneumonia


Likely source is bilateral pneumonia secondary to COVID-19


Patient has completed antibiotic therapy





(2) Pneumonia due to COVID-19 virus


Current Visit: Yes   Status: Acute   


Plan to address problem: 


COVID protocol initiated


IV azithromycin and Rocephin completed


8/14 COVID PCR positive


Elevated inflammatory makers LDH, CRP, procalcitonin, d-dimer, ferritin 


Continue airborne and contact precautions


Continue Supplemental oxygenation as needed


Infectious disease consult requested


Respiratory care ABG and Pulmonary hygiene


CTA Chest was ordered which was negative for pulmonary embolism


Continue Anticoagulation per COVID protocol


D-dimer trending down


Continue p.o. dexamethasone








(3) Acute psychosis-stable


Current Visit: Yes   Status: Acute   


Plan to address problem: 


Currently withdraws to pain however moving all extremities spontaneously


History of paranoid schizophrenia


Mental health consult requested and does not recommend inpatient psych at this 

time as medical needs outweigh psych this time


Requested to reconsult when patient is medically stable


GI consulted for PEG tube placement to facilitate placement








(4) Paranoid schizophrenia


Current Visit: Yes   Status: Chronic   


Plan to address problem: 


History of present schizophrenia


Psych consulted and appreciate recommendations


Depakote and Haldol 


Reorientation as needed








(5) Leukocytosis likely 2/2 PNA/ steroid induced


Current Visit: Yes   Status: Acute   


Plan to address problem: 


Admit WBC 13.8


Elevated-possibly steroid induced





Nuerongenic Dysphagia


Continue tube feeds via Dobhoff.


Aspiration precaution with HOB up at least 35 degrees 


monitor mental status changes for any improvement with psych medications on 

board and recovering from COVID 19


GI consulted-recommends Peg tube placement if patient fail to improve.


Continue Dobhoff feeds and supportive care.








Subjective


Date of service: 08/23/20


Principal diagnosis: Neurogenic Dysphagia


Interval history: 





Patient seen at the bedside. reviewed lab, mar, and v/s. Patient on continuos 

feeding


GI on board-pt needs Peg tube for long term nutrition and hydration. Patient 

appears to be total care.


On supplemental oxygen. patient will benefit from Psych/physical rehab


Elevated leucocytosis-likely 2/2 Infection/Steroid induced-pt on dexamethosone.


Reviewed GI note-and reces


If fails to improve, will need PEG, but this will be somewhat difficult with 

morbid obesity.


Continue Dobhoff feeds and supportive care.


reviewed ID note and reces


continue steroids, day 9 of 10


supportive care and oxygen weaning as tolerated


anticoagulation per protocol based on d-dimer which has been steadily improving.





Objective





- Constitutional


Vitals: 


                               Vital Signs - 12hr











  08/22/20 08/23/20





  22:50 05:32


 


Temperature 99.1 F 98.6 F


 


Pulse Rate 112 H 107 H


 


Respiratory 20 22





Rate  


 


Blood Pressure 184/93 185/103


 


O2 Sat by Pulse 95 94





Oximetry  











General appearance: Present: no acute distress, obese





- EENT


Eyes: PERRL, EOM intact


ENT: hearing intact, clear oral mucosa


Ears: bilateral: normal





- Neck


Neck: supple, normal ROM





- Respiratory


Respiratory effort: normal


Respiratory: bilateral: CTA





- Breasts


Breasts: normal





- Cardiovascular


Rhythm: regular


Heart Sounds: Present: S1 & S2.  Absent: gallop, rub


Extremities: pulses intact, No edema (Patient is presently total care), normal 

color, Full ROM





- Gastrointestinal


General gastrointestinal: Present: soft, non-tender, non-distended, normal bowel

sounds





- Genitourinary


Female genitourinary: normal





- Integumentary


Integumentary: clear, warm, dry





- Musculoskeletal


Musculoskeletal: 1, strength equal bilaterally





- Neurologic


Neurologic: moves all extremities





- Psychiatric


Psychiatric: other (Patient has hx psychosis and schizophrenia)





- Labs


CBC & Chem 7: 


                                 08/23/20 10:50





                                 08/21/20 04:50


Labs: 


                              Abnormal lab results











  08/22/20 08/22/20 08/22/20 Range/Units





  09:32 09:32 12:07 


 


POC Glucose    171 H  ()  


 


Ferritin  991.6 H    (10.0-200.0)  ng/mL


 


Lactate Dehydrogenase   716 H   ()  units/L














  08/22/20 08/22/20 08/23/20 Range/Units





  16:45 23:03 06:22 


 


POC Glucose  247 H  282 H  166 H  ()  


 


Ferritin     (10.0-200.0)  ng/mL


 


Lactate Dehydrogenase     ()  units/L














<JING CERRATO - Last Filed: 08/23/20 18:37>





Assessment and Plan





I saw and evaluated the patient. I agree with the findings and the plan of care 

as documented in the Nurse Practitioner's~note, with the following corrections 

and additions.





Patient more alert today with eyes wide open


Pulled out NG tube today, speaks few words and making eye contact


Will do bedside swallow eval and if no sign of aspiration will start on diet











Objective





- Constitutional


Vitals: 


                               Vital Signs - 12hr











  08/23/20 08/23/20 08/23/20





  11:55 16:42 16:46


 


Temperature 97.9 F  98.5 F


 


Pulse Rate 112 H  109 H


 


Respiratory 19  20





Rate   


 


Blood Pressure 168/85 170/90 179/88


 


O2 Sat by Pulse 93  97





Oximetry   














- Labs


CBC & Chem 7: 


                                 08/23/20 10:50





                                 08/21/20 04:50


Labs: 


                              Abnormal lab results











  08/22/20 08/23/20 08/23/20 Range/Units





  23:03 06:22 10:50 


 


WBC    22.4 H  (4.5-11.0)  K/mm3


 


POC Glucose  282 H  166 H   ()  














  08/23/20 08/23/20 Range/Units





  12:12 16:59 


 


WBC    (4.5-11.0)  K/mm3


 


POC Glucose  167 H  193 H  ()

## 2020-08-23 NOTE — XRAY REPORT
ABDOMEN 1 VIEW



INDICATION / CLINICAL INFORMATION:

TO VERIFY DOBHOFF PLACEMENT.



COMPARISON: 

8/22/2020



FINDINGS:



TUBES / LINES: Malposition of feeding tube projecting over the right mainstem bronchus.

BOWEL GAS PATTERN: No significant abnormality. 

FREE AIR / EXTRALUMINAL GAS: None seen.



ADDITIONAL FINDINGS: No significant additional findings.



IMPRESSION:

1. Malposition of feeding tube projecting over the right mainstem bronchus. Recommend repositioning.



Multiple unsuccessful attempts at communication were made.



Signer Name: Reid Floyd MD 

Signed: 8/23/2020 10:18 AM

Workstation Name: [id number]

## 2020-08-24 LAB
BUN SERPL-MCNC: 12 MG/DL (ref 7–17)
BUN/CREAT SERPL: 20 %
CALCIUM SERPL-MCNC: 8.5 MG/DL (ref 8.4–10.2)
HCT VFR BLD CALC: 36.6 % (ref 30.3–42.9)
HEMOLYSIS INDEX: 8
HGB BLD-MCNC: 12.3 GM/DL (ref 10.1–14.3)
MCHC RBC AUTO-ENTMCNC: 34 % (ref 30–34)
MCV RBC AUTO: 88 FL (ref 79–97)
PLATELET # BLD: 150 K/MM3 (ref 140–440)
RBC # BLD AUTO: 4.15 M/MM3 (ref 3.65–5.03)

## 2020-08-24 RX ADMIN — HYDRALAZINE HYDROCHLORIDE SCH MG: 100 TABLET, FILM COATED ORAL at 14:06

## 2020-08-24 RX ADMIN — METHYLPREDNISOLONE SODIUM SUCCINATE SCH MG: 40 INJECTION, POWDER, FOR SOLUTION INTRAMUSCULAR; INTRAVENOUS at 11:54

## 2020-08-24 RX ADMIN — Medication SCH ML: at 22:41

## 2020-08-24 RX ADMIN — HALOPERIDOL SCH MG: 5 TABLET ORAL at 11:54

## 2020-08-24 RX ADMIN — ENOXAPARIN SODIUM SCH MG: 100 INJECTION SUBCUTANEOUS at 11:55

## 2020-08-24 RX ADMIN — HYDRALAZINE HYDROCHLORIDE SCH MG: 100 TABLET, FILM COATED ORAL at 22:40

## 2020-08-24 RX ADMIN — ENOXAPARIN SODIUM SCH MG: 100 INJECTION SUBCUTANEOUS at 22:41

## 2020-08-24 RX ADMIN — INSULIN LISPRO SCH UNIT: 100 INJECTION, SOLUTION INTRAVENOUS; SUBCUTANEOUS at 18:28

## 2020-08-24 RX ADMIN — Medication SCH ML: at 11:54

## 2020-08-24 RX ADMIN — INSULIN LISPRO SCH UNIT: 100 INJECTION, SOLUTION INTRAVENOUS; SUBCUTANEOUS at 05:54

## 2020-08-24 RX ADMIN — BENZTROPINE MESYLATE SCH MG: 1 TABLET ORAL at 16:28

## 2020-08-24 RX ADMIN — VALPROIC ACID SCH MG: 250 SOLUTION ORAL at 22:42

## 2020-08-24 RX ADMIN — BENZTROPINE MESYLATE SCH MG: 1 TABLET ORAL at 22:41

## 2020-08-24 RX ADMIN — DEXTROSE AND SODIUM CHLORIDE SCH MLS/HR: 5; .9 INJECTION, SOLUTION INTRAVENOUS at 18:29

## 2020-08-24 RX ADMIN — CLONIDINE SCH MG: 0.2 PATCH TRANSDERMAL at 14:05

## 2020-08-24 RX ADMIN — HALOPERIDOL SCH MG: 5 TABLET ORAL at 22:43

## 2020-08-24 RX ADMIN — DEXTROSE AND SODIUM CHLORIDE SCH MLS/HR: 5; .9 INJECTION, SOLUTION INTRAVENOUS at 01:48

## 2020-08-24 RX ADMIN — INSULIN LISPRO SCH: 100 INJECTION, SOLUTION INTRAVENOUS; SUBCUTANEOUS at 13:58

## 2020-08-24 NOTE — PROGRESS NOTE
<PADMA AMES - Last Filed: 08/24/20 14:25>





Assessment and Plan





- Patient Problems


(1) Severe sepsis


Current Visit: Yes   Status: Resolved   


Plan to address problem: 


- Presented with low-grade fever, tachycardia, leukocytosis, WHITNEY, and bilateral 

pneumonia


- Likely source is bilateral pneumonia secondary to COVID-19


- IV antibiotics completed


- Covid protocol initiated











(2) Pneumonia due to COVID-19 virus


Current Visit: Yes   Status: Acute   


Plan to address problem: 


- COVID protocol initiated


- IV azithromycin and Rocephin completed


- 8/14 COVID PCR positive


- Trend LDH, CRP, procalcitonin, d-dimer, ferritin 


- Droplet /contact precautions


- Supplemental oxygenation as needed


- Infectious disease consult requested


- Pulmonary hygiene


- In setting of elevated D-Dimer, CTA Chest was ordered which was negative for 

pulmonary embolism


- Anticoagulation per COVID protocol


- Cannot initiate Remdesivir in setting on of elevated LFTs


- IV solumedrel changed to PO prednisone and started on taper








(3) Acute psychosis


Current Visit: Yes   Status: Acute   


Plan to address problem: 


- History of paranoid schizophrenia


- Mental health consult requested and does not recommend inpatient psych at this

time as medical needs outweigh psych this time


- Requested to reconsult when patient is medically stable


- GI consulted for PEG tube placement to facilitate placement, will hold off for

now and revaluate if their is no improvement








(4) Paranoid schizophrenia


Current Visit: Yes   Status: Chronic   


Plan to address problem: 


-History of present schizophrenia


-8/24 Psych reconsulted


-Depakote and Haldol 


-Sleep hygiene


-Reorientation as needed








(5) Leukocytosis


Current Visit: Yes   Status: Acute   


Plan to address problem: 


- Admit WBC 13.8


- Trend CBC


- Maybe secondary to possible PNA or from dehydration


- IV abx completed


- Also on steroids for COVID pneumonia








(6) Hypertension


Current Visit: Yes   Status: Chronic   


Plan to address problem: 


- BP monitoring per protocol


- PRN hydralizine


- Clonidine TD and hydralazine PO








(7) DVT prophylaxis


Current Visit: Yes   Status: Acute   


Plan to address problem: 


- SCDs to BLE while in bed


- Lovenox subq twice daily per COVID protocol








History


Interval history: 


This is a 55-year-old female with paranoid schizophrenia that presents to the 

emergency department on 8/13 for altered mental status in a catatonic state.  

Previous hospitalizations reviewed and only notes a history of paranoid 

schizophrenia. Patient may be a resident of [place].  Patient is currently 

nonverbal and HPI is received from ER documentation.  Work-up in the emergency 

department included a CT abdomen pelvis which shows patchy peripheral 

groundglass opacities bilateral lungs which is consistent with either viral or 

atypical pneumonia, positive anterior lateral fat hernia in the left mid abdomen

and a large uterine fibroids however her CXR showed no acute pulmonary or 

pleural abnormalities.  She was found to have acute kidney injury with a 

creatinine of 1.4/BUN 33 as baseline from previous records seems to be 0.7, 

rhabdomyolysis with a creatinine kinase of 33,481, leukocytosis with a WBC of 

13.8, thrombocytopenia with platelets at 97.  She has COVID pneumonia with IV 

antibiotics, rhabdomyolysis, and WHITNEY. Infectious disease, psych, and neurology 

have been consulted. Neurology does not suspect NMS at this time. Psych has 

signed off at this time due to ongoing acute medical processes.  PT/ST consulted

for evaluation but unable to be performed as patient will not follow verbal 

commands. She now eats apple sauce per the nurse and a pureed diet has been 

ordered. Patient opens eyes and occasionally follows commands. RN to call PT for

evaluation today. 








* 8/24: pureed diet


* 8/21: continue care


* 8/20: GI consult for peg


* 8/19: continue care


* Discussed with primary caretaker patient was normally verbal but sometimes 

  goes into a catatonic state.  Phone number for this is [phone]


* 8/18: PT consulted.  Psych signed off.  Continue treatment


* 8/17: continue treatment


* 8/16: Change fluids to NS 1/2 AT 125CC/hr. Rhabdomylysis improving, insert NGT

  and start tube feeds. Psych input noted. Continue management for COVID 19. ID 

  consulted. No new fever.  CTA chest and CTH obtained which were both negative


* 8/15: COVID PCR Positive








Hospitalist Physical





- Constitutional


Vitals: 


                                        











Temp Pulse Resp BP Pulse Ox


 


 98.0 F   115 H  22   178/108   95 


 


 08/24/20 12:05  08/24/20 14:05  08/24/20 12:05  08/24/20 14:05  08/24/20 12:05











General appearance: Present: no acute distress, obese





- EENT


Eyes: Present: PERRL


ENT: hearing intact





- Neck


Neck: Present: normal ROM





- Respiratory


Respiratory effort: normal


Respiratory: bilateral: CTA





- Cardiovascular


Rhythm: regular


Heart Sounds: Present: S1 & S2.  Absent: systolic murmur, diastolic murmur





- Extremities


Extremities: no ischemia, pulses intact, pulses symmetrical, No edema, normal 

temperature, normal color, Full ROM


Peripheral Pulses: within normal limits





- Abdominal


General gastrointestinal: soft, non-tender, non-distended, normal bowel sounds





- Integumentary


Integumentary: Present: clear, warm, dry





- Psychiatric


Psychiatric: other (opens eyes spontanously, eating  pureed diet now)





- Neurologic


Neurologic: no focal deficits, moves all extremities





- Allied Health


Allied health notes reviewed: nursing, social work, case management





Results





- Labs


CBC & Chem 7: 


                                 08/23/20 10:50





                                 08/21/20 04:50


Labs: 


                             Laboratory Last Values











WBC  22.4 K/mm3 (4.5-11.0)  H  08/23/20  10:50    


 


RBC  4.20 M/mm3 (3.65-5.03)   08/23/20  10:50    


 


Hgb  12.3 gm/dl (10.1-14.3)   08/23/20  10:50    


 


Hct  36.8 % (30.3-42.9)   08/23/20  10:50    


 


MCV  88 fl (79-97)   08/23/20  10:50    


 


MCH  29 pg (28-32)   08/23/20  10:50    


 


MCHC  33 % (30-34)   08/23/20  10:50    


 


RDW  15.0 % (13.2-15.2)   08/23/20  10:50    


 


Plt Count  164 K/mm3 (140-440)   08/23/20  10:50    


 


Lymph % (Auto)  10.6 % (13.4-35.0)  L  08/13/20  22:44    


 


Mono % (Auto)  9.8 % (0.0-7.3)  H  08/13/20  22:44    


 


Eos % (Auto)  0.0 % (0.0-4.3)   08/13/20  22:44    


 


Baso % (Auto)  1.2 % (0.0-1.8)   08/13/20  22:44    


 


Lymph #  1.5 K/mm3 (1.2-5.4)   08/13/20  22:44    


 


Mono #  1.3 K/mm3 (0.0-0.8)  H  08/13/20  22:44    


 


Eos #  0.0 K/mm3 (0.0-0.4)   08/13/20  22:44    


 


Baso #  0.2 K/mm3 (0.0-0.1)  H  08/13/20  22:44    


 


Add Manual Diff  Complete   08/21/20  04:50    


 


Total Counted  100   08/21/20  04:50    


 


Seg Neutrophils %  78.4 % (40.0-70.0)  H  08/13/20  22:44    


 


Seg Neuts % (Manual)  87.0 % (40.0-70.0)  H  08/21/20  04:50    


 


Band Neutrophils %  4.0 %  08/21/20  04:50    


 


Lymphocytes % (Manual)  2.0 % (13.4-35.0)  L  08/21/20  04:50    


 


Reactive Lymphs % (Man)  0 %  08/21/20  04:50    


 


Monocytes % (Manual)  6.0 % (0.0-7.3)   08/21/20  04:50    


 


Eosinophils % (Manual)  0 % (0.0-4.3)   08/21/20  04:50    


 


Basophils % (Manual)  0 % (0.0-1.8)   08/21/20  04:50    


 


Metamyelocytes %  1.0 %  08/21/20  04:50    


 


Myelocytes %  0 %  08/21/20  04:50    


 


Promyelocytes %  0 %  08/21/20  04:50    


 


Blast Cells %  0 %  08/21/20  04:50    


 


Nucleated RBC %  1.0 % (0.0-0.9)  H  08/21/20  04:50    


 


Seg Neutrophils #  10.8 K/mm3 (1.8-7.7)  H  08/13/20  22:44    


 


Seg Neutrophils # Man  14.4 K/mm3 (1.8-7.7)  H  08/21/20  04:50    


 


Band Neutrophils #  0.7 K/mm3  08/21/20  04:50    


 


Lymphocytes # (Manual)  0.3 K/mm3 (1.2-5.4)  L  08/21/20  04:50    


 


Abs React Lymphs (Man)  0.0 K/mm3  08/21/20  04:50    


 


Monocytes # (Manual)  1.0 K/mm3 (0.0-0.8)  H  08/21/20  04:50    


 


Eosinophils # (Manual)  0.0 K/mm3 (0.0-0.4)   08/21/20  04:50    


 


Basophils # (Manual)  0.0 K/mm3 (0.0-0.1)   08/21/20  04:50    


 


Metamyelocytes #  0.2 K/mm3  08/21/20  04:50    


 


Myelocytes #  0.0 K/mm3  08/21/20  04:50    


 


Promyelocytes #  0.0 K/mm3  08/21/20  04:50    


 


Blast Cells #  0.0 K/mm3  08/21/20  04:50    


 


WBC Morphology  Not Reportable   08/21/20  04:50    


 


Hypersegmented Neuts  Not Reportable   08/21/20  04:50    


 


Hyposegmented Neuts  Not Reportable   08/21/20  04:50    


 


Hypogranular Neuts  Not Reportable   08/21/20  04:50    


 


Smudge Cells  Not Reportable   08/21/20  04:50    


 


Toxic Granulation  Not Reportable   08/21/20  04:50    


 


Toxic Vacuolation  Not Reportable   08/21/20  04:50    


 


Dohle Bodies  Not Reportable   08/21/20  04:50    


 


Pelger-Huet Anomaly  Not Reportable   08/21/20  04:50    


 


Arnie Rods  Not Reportable   08/21/20  04:50    


 


Platelet Estimate  Consistent w auto   08/21/20  04:50    


 


Clumped Platelets  Not Reportable   08/21/20  04:50    


 


Plt Clumps, EDTA  Not Reportable   08/21/20  04:50    


 


Large Platelets  Not Reportable   08/21/20  04:50    


 


Giant Platelets  Not Reportable   08/21/20  04:50    


 


Platelet Satelliting  Not Reportable   08/21/20  04:50    


 


Plt Morphology Comment  Not Reportable   08/21/20  04:50    


 


RBC Morphology  Not Reportable   08/21/20  04:50    


 


Dimorphic RBCs  Not Reportable   08/21/20  04:50    


 


Polychromasia  Not Reportable   08/21/20  04:50    


 


Hypochromasia  Not Reportable   08/21/20  04:50    


 


Poikilocytosis  Not Reportable   08/21/20  04:50    


 


Anisocytosis  Not Reportable   08/21/20  04:50    


 


Microcytosis  Not Reportable   08/21/20  04:50    


 


Macrocytosis  1+   08/21/20  04:50    


 


Spherocytes  Not Reportable   08/21/20  04:50    


 


Pappenheimer Bodies  Not Reportable   08/21/20  04:50    


 


Sickle Cells  Not Reportable   08/21/20  04:50    


 


Target Cells  1+   08/21/20  04:50    


 


Tear Drop Cells  Not Reportable   08/21/20  04:50    


 


Ovalocytes  Not Reportable   08/21/20  04:50    


 


Helmet Cells  Not Reportable   08/21/20  04:50    


 


Mancera-[place] Bodies  Not Reportable   08/21/20  04:50    


 


Cabot Rings  Not Reportable   08/21/20  04:50    


 


Purchase Cells  Not Reportable   08/21/20  04:50    


 


Bite Cells  Not Reportable   08/21/20  04:50    


 


Crenated Cell  Not Reportable   08/21/20  04:50    


 


Elliptocytes  Not Reportable   08/21/20  04:50    


 


Acanthocytes (Spur)  Not Reportable   08/21/20  04:50    


 


Rouleaux  Not Reportable   08/21/20  04:50    


 


Hemoglobin C Crystals  Not Reportable   08/21/20  04:50    


 


Schistocytes  Not Reportable   08/21/20  04:50    


 


Malaria parasites  Not Reportable   08/21/20  04:50    


 


D[place]ota Bodies  Not Reportable   08/21/20  04:50    


 


Hem Pathologist Commnt  No   08/21/20  04:50    


 


PT  14.9 Sec. (12.2-14.9)   08/20/20  15:23    


 


INR  1.15  (0.87-1.13)  H  08/20/20  15:23    


 


APTT  29.5 Sec. (24.2-36.6)   08/20/20  15:23    


 


D-Dimer  390.46 ng/mlDDU (0-234)  H  08/22/20  09:32    


 


Sodium  144 mmol/L (137-145)   08/21/20  04:50    


 


Potassium  4.1 mmol/L (3.6-5.0)   08/21/20  04:50    


 


Chloride  105.8 mmol/L ()   08/21/20  04:50    


 


Carbon Dioxide  27 mmol/L (22-30)   08/21/20  04:50    


 


Anion Gap  15 mmol/L  08/21/20  04:50    


 


BUN  19 mg/dL (7-17)  H  08/21/20  04:50    


 


Creatinine  0.7 mg/dL (0.6-1.2)   08/21/20  04:50    


 


Estimated GFR  > 60 ml/min  08/21/20  04:50    


 


BUN/Creatinine Ratio  27 %  08/21/20  04:50    


 


Glucose  334 mg/dL ()  H  08/21/20  04:50    


 


POC Glucose  139  ()  H  08/24/20  12:20    


 


Lactic Acid  1.30 mmol/L (0.7-2.0)   08/13/20  22:44    


 


Calcium  7.9 mg/dL (8.4-10.2)  L  08/21/20  04:50    


 


Ferritin  991.6 ng/mL (10.0-200.0)  H  08/22/20  09:32    


 


Total Bilirubin  0.40 mg/dL (0.1-1.2)   08/18/20  08:32    


 


Direct Bilirubin  0.3 mg/dL (0-0.2)  H  08/13/20  22:44    


 


Indirect Bilirubin  0.2 mg/dL  08/13/20  22:44    


 


AST  149 units/L (5-40)  H  08/18/20  08:32    


 


ALT  96 units/L (7-56)  H  08/18/20  08:32    


 


Alkaline Phosphatase  40 units/L ()   08/18/20  08:32    


 


Lactate Dehydrogenase  716 units/L ()  H  08/22/20  09:32    


 


Total Creatine Kinase  934 units/L ()  H  08/18/20  08:32    


 


C-Reactive Protein  0.80 mg/dL (0.00-1.30)   08/22/20  09:32    


 


Total Protein  6.1 g/dL (6.3-8.2)  L  08/18/20  08:32    


 


Albumin  2.5 g/dL (3.9-5)  L  08/18/20  08:32    


 


Albumin/Globulin Ratio  0.7 %  08/18/20  08:32    


 


Procalcitonin  0.71 ng/mL (<0.15)   08/18/20  08:32    


 


Urine Color  Tiffany  (Yellow)   08/14/20  Unknown


 


Urine Turbidity  Slightly-cloudy  (Clear)   08/14/20  Unknown


 


Urine pH  5.0  (5.0-7.0)   08/14/20  Unknown


 


Ur Specific Gravity  1.026  (1.003-1.030)   08/14/20  Unknown


 


Urine Protein  100 mg/dl mg/dL (Negative)   08/14/20  Unknown


 


Urine Glucose (UA)  Neg mg/dL (Negative)   08/14/20  Unknown


 


Urine Ketones  Tr mg/dL (Negative)   08/14/20  Unknown


 


Urine Blood  Lg  (Negative)   08/14/20  Unknown


 


Urine Nitrite  Neg  (Negative)   08/14/20  Unknown


 


Urine Bilirubin  Neg  (Negative)   08/14/20  Unknown


 


Urine Urobilinogen  2.0 mg/dL (<2.0)   08/14/20  Unknown


 


Ur Leukocyte Esterase  Neg  (Negative)   08/14/20  Unknown


 


Urine WBC (Auto)  17.0 /HPF (0.0-6.0)  H  08/14/20  Unknown


 


Urine RBC (Auto)  1.0 /HPF (0.0-6.0)   08/14/20  Unknown


 


U Epithel Cells (Auto)  4.0 /HPF (0-13.0)   08/14/20  Unknown


 


Urine Mucus  2+ /HPF  08/14/20  Unknown


 


Salicylates  < 0.3 mg/dL (2.8-20.0)  L  08/13/20  22:44    


 


Urine Opiates Screen  Presumptive negative   08/14/20  Unknown


 


Urine Methadone Screen  Presumptive negative   08/14/20  Unknown


 


Acetaminophen  5.0 ug/mL (10.0-30.0)  L  08/13/20  22:44    


 


Ur Barbiturates Screen  Presumptive negative   08/14/20  Unknown


 


Valproic Acid  31.8 ug/mL ()  L  08/17/20  08:46    


 


Ur Phencyclidine Scrn  Presumptive negative   08/14/20  Unknown


 


Ur Amphetamines Screen  Presumptive negative   08/14/20  Unknown


 


U Benzodiazepines Scrn  Presumptive negative   08/14/20  Unknown


 


Urine Cocaine Screen  Presumptive negative   08/14/20  Unknown


 


U Marijuana (THC) Screen  Presumptive negative   08/14/20  Unknown


 


Drugs of Abuse Note  Disclamer   08/14/20  Unknown


 


Plasma/Serum Alcohol  < 0.01 % (0-0.07)   08/13/20  22:44    


 


Coronavirus (PCR)  Positive  (Negative)  A  08/15/20  Unknown











Schmidt/IV: 


                                        





Voiding Method                   External Female Catheter


IV Catheter Type [Right Upper    Peripheral IV


arm]                             


IV Catheter Type [Right          INT / Saline Lock


Antecubital]                     


IV Catheter Type [Right Foot]    INT / Saline Lock


IV Catheter Type [Right          Peripheral IV


External Jugular]                











Active Medications





- Current Medications


Current Medications: 














Generic Name Dose Route Start Last Admin





  Trade Name Freq  PRN Reason Stop Dose Admin


 


Acetaminophen  650 mg  08/14/20 08:00  08/21/20 12:10





  Tylenol  PO   650 mg





  Q4H PRN   Administration





  Pain MILD(1-3)/Fever >100.5/HA  


 


Lipase/Protease/Amylase  1 each  08/17/20 10:34 





  Pancremarilin Clay 10,500 Unit  FEEDTUBE  





  PRN PRN  





  For Clogged Feeding Tube  


 


Clonidine HCl  0.2 mg  08/24/20 13:00  08/24/20 14:05





  Catapres-Tts Patch  TD   0.2 mg





  Mo KATHYA   Administration


 


Enoxaparin Sodium  40 mg  08/17/20 22:00  08/24/20 11:55





  Enoxaparin  SUB-Q   40 mg





  BID KATHYA   Administration


 


Haloperidol  5 mg  08/15/20 12:00  08/24/20 11:54





  Haldol  PO   5 mg





  BID KATHYA   Administration


 


Hydralazine HCl  5 mg  08/24/20 12:54 





  Apresoline  IV  





  Q30MIN PRN  





  Hypertension  


 


Hydralazine HCl  100 mg  08/24/20 14:00  08/24/20 14:06





  Apresoline  PO   100 mg





  TID KATHYA   Administration


 


Dextrose/Sodium Chloride  1,000 mls @ 75 mls/hr  08/23/20 22:00  08/24/20 01:48





  D5ns  IV   75 mls/hr





  AS DIRECT KATHYA   Administration


 


Insulin Human Lispro  0 unit  08/19/20 01:00  08/24/20 13:58





  Humalog  SUB-Q   Not Given





  Q6HR [facility]  





  Protocol  


 


Methylprednisolone Sodium Succinate  40 mg  08/20/20 10:00  08/24/20 11:54





  Solu-Medrol  IV   40 mg





  Q24HR KATHYA   Administration


 


Miscellaneous Medication  325 mg  08/15/20 18:45 





  Paliperidone Palmitate [Invega Sustenna]  IM  





  Q3W KATHYA  


 


Ondansetron HCl  4 mg  08/14/20 08:00 





  Zofran  IV  





  Q8H PRN  





  Nausea And Vomiting  


 


Simple Syrup  15 ml  08/17/20 10:34 





  Simple Syrup  FEEDTUBE  





  PRN PRN  





  Hypoglycemia  


 


Simple Syrup  30 ml  08/17/20 10:34 





  Simple Syrup  FEEDTUBE  





  PRN PRN  





  Hypoglycemia  


 


Sodium Bicarbonate  325 mg  08/17/20 10:34 





  Sodium Bicarbonate  FEEDTUBE  





  PRN PRN  





  For Clogged Feeding Tube  


 


Sodium Chloride  10 ml  08/14/20 10:00  08/24/20 11:54





  Sodium Chloride Flush Syringe 10 Ml  IV   10 ml





  BID KATHYA   Administration


 


Sodium Chloride  10 ml  08/14/20 07:50 





  Sodium Chloride Flush Syringe 10 Ml  IV  08/27/20 07:49 





  PRN PRN  





  LINE FLUSH  


 


Trazodone HCl  50 mg  08/14/20 22:00  08/23/20 21:44





  Desyrel  PO   50 mg





  QHS KATHYA   Administration


 


Valproic Acid  1,500 mg  08/18/20 22:00  08/23/20 21:44





  Depakene Liq  PO   1,500 mg





  QHS KATHYA   Administration














Nutrition/Malnutrition Assess





- Dietary Evaluation


Nutrition/Malnutrition Findings: 


                                        





Nutrition Notes                                            Start:  08/17/20 

09:33


Freq:                                                      Status: Active       




Protocol:                                                                       




 Document     08/24/20 10:47  CULLEN  (Rec: 08/24/20 10:52  NHALL  SRW-

[email or web address])


 Nutrition Notes


     Initial or Follow up                        Reassessment


     Other Pertinent Diagnosis                   COVID-19 (+), Schizoaffective


                                                 disorder


     Current Diet                                Pureed


     Labs/Tests                                  Reviewed


     Pertinent Medications                       D5NS at 75ml/hr


     Height                                      5 ft 6 in


     Weight                                      114.5 kg


     Ideal Body Weight (kg)                      59.09


     BMI                                         40.7


     Subjective/Other Information                Pt pulled NG tube out


                                                 yesterday.  Per RN note, pt


                                                 tolerated applesauce and sips


                                                 of water last pm.  SLP


                                                 evaluated pt on 8/19 and


                                                 deemed pt unsafe for PO intake


                                                 at that time.  Pt may need


                                                 PEG tube if unable to consume


                                                 adequate PO to meet needs.


     Burn                                        Absent


     Trauma                                      Absent


     #1


      Nutrition Diagnosis                        Inadequate oral intake


      Diagnosis Progress(for reassessment        Continues


       documentation)                            


     Is patient on ventilator?                   No


     Is Patient Ambulatory and/or Out of Bed     No


     REE-([facility]-confined to bed)      2112.012


     Kcal/Kg value to use for calculation        14


     Approximate Energy Requirements Using       1603


      kcal/Kg                                    


     Calculation Used for Recommendations        Kcal/kg


     Additional Notes                            Pro needs 0.8-1g/kg adjBW: 69-


                                                 87g/day


                                                 Fluid needs 1ml/kcal


 Nutrition Intervention


     Change Diet Order:                          Continue current diet order as


                                                 tolerated; add consistent CHO


                                                 modifier


     Goal #1                                     PO tolerance


     Follow-Up By:                               08/26/20


     Additional Comments                         F/U: PO tolerance, SLP


                                                 evaluation











<JING CERRATO R - Last Filed: 08/25/20 05:59>





Assessment and Plan


Assessment and plan: 





I saw and evaluated the patient. I agree with the findings and the plan of care 

as documented in the Nurse Practitioner's~note, with the following corrections 

and additions.





- Adjust BP meds, patient is tolerating diet


- remains confused, cont restraint





Hospitalist Physical





- Constitutional


Vitals: 


                                        











Temp Pulse Resp BP Pulse Ox


 


 97.9 F   84   20   134/83   96 


 


 08/24/20 22:09  08/24/20 22:09  08/24/20 22:09  08/24/20 22:09  08/24/20 22:09














Results





- Labs


CBC & Chem 7: 


                                 08/24/20 19:35





                                 08/24/20 19:35


Labs: 


                             Laboratory Last Values











WBC  14.3 K/mm3 (4.5-11.0)  H  08/24/20  19:35    


 


RBC  4.15 M/mm3 (3.65-5.03)   08/24/20  19:35    


 


Hgb  12.3 gm/dl (10.1-14.3)   08/24/20  19:35    


 


Hct  36.6 % (30.3-42.9)   08/24/20  19:35    


 


MCV  88 fl (79-97)   08/24/20  19:35    


 


MCH  30 pg (28-32)   08/24/20  19:35    


 


MCHC  34 % (30-34)   08/24/20  19:35    


 


RDW  15.2 % (13.2-15.2)   08/24/20  19:35    


 


Plt Count  150 K/mm3 (140-440)   08/24/20  19:35    


 


Lymph % (Auto)  10.6 % (13.4-35.0)  L  08/13/20  22:44    


 


Mono % (Auto)  9.8 % (0.0-7.3)  H  08/13/20  22:44    


 


Eos % (Auto)  0.0 % (0.0-4.3)   08/13/20  22:44    


 


Baso % (Auto)  1.2 % (0.0-1.8)   08/13/20  22:44    


 


Lymph #  1.5 K/mm3 (1.2-5.4)   08/13/20  22:44    


 


Mono #  1.3 K/mm3 (0.0-0.8)  H  08/13/20  22:44    


 


Eos #  0.0 K/mm3 (0.0-0.4)   08/13/20  22:44    


 


Baso #  0.2 K/mm3 (0.0-0.1)  H  08/13/20  22:44    


 


Add Manual Diff  Complete   08/21/20  04:50    


 


Total Counted  100   08/21/20  04:50    


 


Seg Neutrophils %  78.4 % (40.0-70.0)  H  08/13/20  22:44    


 


Seg Neuts % (Manual)  87.0 % (40.0-70.0)  H  08/21/20  04:50    


 


Band Neutrophils %  4.0 %  08/21/20  04:50    


 


Lymphocytes % (Manual)  2.0 % (13.4-35.0)  L  08/21/20  04:50    


 


Reactive Lymphs % (Man)  0 %  08/21/20  04:50    


 


Monocytes % (Manual)  6.0 % (0.0-7.3)   08/21/20  04:50    


 


Eosinophils % (Manual)  0 % (0.0-4.3)   08/21/20  04:50    


 


Basophils % (Manual)  0 % (0.0-1.8)   08/21/20  04:50    


 


Metamyelocytes %  1.0 %  08/21/20  04:50    


 


Myelocytes %  0 %  08/21/20  04:50    


 


Promyelocytes %  0 %  08/21/20  04:50    


 


Blast Cells %  0 %  08/21/20  04:50    


 


Nucleated RBC %  1.0 % (0.0-0.9)  H  08/21/20  04:50    


 


Seg Neutrophils #  10.8 K/mm3 (1.8-7.7)  H  08/13/20  22:44    


 


Seg Neutrophils # Man  14.4 K/mm3 (1.8-7.7)  H  08/21/20  04:50    


 


Band Neutrophils #  0.7 K/mm3  08/21/20  04:50    


 


Lymphocytes # (Manual)  0.3 K/mm3 (1.2-5.4)  L  08/21/20  04:50    


 


Abs React Lymphs (Man)  0.0 K/mm3  08/21/20  04:50    


 


Monocytes # (Manual)  1.0 K/mm3 (0.0-0.8)  H  08/21/20  04:50    


 


Eosinophils # (Manual)  0.0 K/mm3 (0.0-0.4)   08/21/20  04:50    


 


Basophils # (Manual)  0.0 K/mm3 (0.0-0.1)   08/21/20  04:50    


 


Metamyelocytes #  0.2 K/mm3  08/21/20  04:50    


 


Myelocytes #  0.0 K/mm3  08/21/20  04:50    


 


Promyelocytes #  0.0 K/mm3  08/21/20  04:50    


 


Blast Cells #  0.0 K/mm3  08/21/20  04:50    


 


WBC Morphology  Not Reportable   08/21/20  04:50    


 


Hypersegmented Neuts  Not Reportable   08/21/20  04:50    


 


Hyposegmented Neuts  Not Reportable   08/21/20  04:50    


 


Hypogranular Neuts  Not Reportable   08/21/20  04:50    


 


Smudge Cells  Not Reportable   08/21/20  04:50    


 


Toxic Granulation  Not Reportable   08/21/20  04:50    


 


Toxic Vacuolation  Not Reportable   08/21/20  04:50    


 


Dohle Bodies  Not Reportable   08/21/20  04:50    


 


Pelger-Huet Anomaly  Not Reportable   08/21/20  04:50    


 


Arnie Rods  Not Reportable   08/21/20  04:50    


 


Platelet Estimate  Consistent w auto   08/21/20  04:50    


 


Clumped Platelets  Not Reportable   08/21/20  04:50    


 


Plt Clumps, EDTA  Not Reportable   08/21/20  04:50    


 


Large Platelets  Not Reportable   08/21/20  04:50    


 


Giant Platelets  Not Reportable   08/21/20  04:50    


 


Platelet Satelliting  Not Reportable   08/21/20  04:50    


 


Plt Morphology Comment  Not Reportable   08/21/20  04:50    


 


RBC Morphology  Not Reportable   08/21/20  04:50    


 


Dimorphic RBCs  Not Reportable   08/21/20  04:50    


 


Polychromasia  Not Reportable   08/21/20  04:50    


 


Hypochromasia  Not Reportable   08/21/20  04:50    


 


Poikilocytosis  Not Reportable   08/21/20  04:50    


 


Anisocytosis  Not Reportable   08/21/20  04:50    


 


Microcytosis  Not Reportable   08/21/20  04:50    


 


Macrocytosis  1+   08/21/20  04:50    


 


Spherocytes  Not Reportable   08/21/20  04:50    


 


Pappenheimer Bodies  Not Reportable   08/21/20  04:50    


 


Sickle Cells  Not Reportable   08/21/20  04:50    


 


Target Cells  1+   08/21/20  04:50    


 


Tear Drop Cells  Not Reportable   08/21/20  04:50    


 


Ovalocytes  Not Reportable   08/21/20  04:50    


 


Helmet Cells  Not Reportable   08/21/20  04:50    


 


Mancera-[place] Bodies  Not Reportable   08/21/20  04:50    


 


Cabot Rings  Not Reportable   08/21/20  04:50    


 


Purchase Cells  Not Reportable   08/21/20  04:50    


 


Bite Cells  Not Reportable   08/21/20  04:50    


 


Crenated Cell  Not Reportable   08/21/20  04:50    


 


Elliptocytes  Not Reportable   08/21/20  04:50    


 


Acanthocytes (Spur)  Not Reportable   08/21/20  04:50    


 


Rouleaux  Not Reportable   08/21/20  04:50    


 


Hemoglobin C Crystals  Not Reportable   08/21/20  04:50    


 


Schistocytes  Not Reportable   08/21/20  04:50    


 


Malaria parasites  Not Reportable   08/21/20  04:50    


 


D[place]ota Bodies  Not Reportable   08/21/20  04:50    


 


Hem Pathologist Commnt  No   08/21/20  04:50    


 


PT  14.9 Sec. (12.2-14.9)   08/20/20  15:23    


 


INR  1.15  (0.87-1.13)  H  08/20/20  15:23    


 


APTT  29.5 Sec. (24.2-36.6)   08/20/20  15:23    


 


D-Dimer  390.46 ng/mlDDU (0-234)  H  08/22/20  09:32    


 


Sodium  136 mmol/L (137-145)  L D 08/24/20  19:35    


 


Potassium  4.5 mmol/L (3.6-5.0)   08/24/20  19:35    


 


Chloride  98.8 mmol/L ()   08/24/20  19:35    


 


Carbon Dioxide  25 mmol/L (22-30)   08/24/20  19:35    


 


Anion Gap  17 mmol/L  08/24/20  19:35    


 


BUN  12 mg/dL (7-17)   08/24/20  19:35    


 


Creatinine  0.6 mg/dL (0.6-1.2)   08/24/20  19:35    


 


Estimated GFR  > 60 ml/min  08/24/20  19:35    


 


BUN/Creatinine Ratio  20 %  08/24/20  19:35    


 


Glucose  278 mg/dL ()  H  08/24/20  19:35    


 


POC Glucose  211  ()  H  08/24/20  23:55    


 


Lactic Acid  1.30 mmol/L (0.7-2.0)   08/13/20  22:44    


 


Calcium  8.5 mg/dL (8.4-10.2)   08/24/20  19:35    


 


Ferritin  991.6 ng/mL (10.0-200.0)  H  08/22/20  09:32    


 


Total Bilirubin  0.40 mg/dL (0.1-1.2)   08/18/20  08:32    


 


Direct Bilirubin  0.3 mg/dL (0-0.2)  H  08/13/20  22:44    


 


Indirect Bilirubin  0.2 mg/dL  08/13/20  22:44    


 


AST  149 units/L (5-40)  H  08/18/20  08:32    


 


ALT  96 units/L (7-56)  H  08/18/20  08:32    


 


Alkaline Phosphatase  40 units/L ()   08/18/20  08:32    


 


Lactate Dehydrogenase  716 units/L ()  H  08/22/20  09:32    


 


Total Creatine Kinase  934 units/L ()  H  08/18/20  08:32    


 


C-Reactive Protein  0.80 mg/dL (0.00-1.30)   08/22/20  09:32    


 


Total Protein  6.1 g/dL (6.3-8.2)  L  08/18/20  08:32    


 


Albumin  2.5 g/dL (3.9-5)  L  08/18/20  08:32    


 


Albumin/Globulin Ratio  0.7 %  08/18/20  08:32    


 


Procalcitonin  0.71 ng/mL (<0.15)   08/18/20  08:32    


 


Urine Color  Tiffany  (Yellow)   08/14/20  Unknown


 


Urine Turbidity  Slightly-cloudy  (Clear)   08/14/20  Unknown


 


Urine pH  5.0  (5.0-7.0)   08/14/20  Unknown


 


Ur Specific Gravity  1.026  (1.003-1.030)   08/14/20  Unknown


 


Urine Protein  100 mg/dl mg/dL (Negative)   08/14/20  Unknown


 


Urine Glucose (UA)  Neg mg/dL (Negative)   08/14/20  Unknown


 


Urine Ketones  Tr mg/dL (Negative)   08/14/20  Unknown


 


Urine Blood  Lg  (Negative)   08/14/20  Unknown


 


Urine Nitrite  Neg  (Negative)   08/14/20  Unknown


 


Urine Bilirubin  Neg  (Negative)   08/14/20  Unknown


 


Urine Urobilinogen  2.0 mg/dL (<2.0)   08/14/20  Unknown


 


Ur Leukocyte Esterase  Neg  (Negative)   08/14/20  Unknown


 


Urine WBC (Auto)  17.0 /HPF (0.0-6.0)  H  08/14/20  Unknown


 


Urine RBC (Auto)  1.0 /HPF (0.0-6.0)   08/14/20  Unknown


 


U Epithel Cells (Auto)  4.0 /HPF (0-13.0)   08/14/20  Unknown


 


Urine Mucus  2+ /HPF  08/14/20  Unknown


 


Salicylates  < 0.3 mg/dL (2.8-20.0)  L  08/13/20  22:44    


 


Urine Opiates Screen  Presumptive negative   08/14/20  Unknown


 


Urine Methadone Screen  Presumptive negative   08/14/20  Unknown


 


Acetaminophen  5.0 ug/mL (10.0-30.0)  L  08/13/20  22:44    


 


Ur Barbiturates Screen  Presumptive negative   08/14/20  Unknown


 


Valproic Acid  31.8 ug/mL ()  L  08/17/20  08:46    


 


Ur Phencyclidine Scrn  Presumptive negative   08/14/20  Unknown


 


Ur Amphetamines Screen  Presumptive negative   08/14/20  Unknown


 


U Benzodiazepines Scrn  Presumptive negative   08/14/20  Unknown


 


Urine Cocaine Screen  Presumptive negative   08/14/20  Unknown


 


U Marijuana (THC) Screen  Presumptive negative   08/14/20  Unknown


 


Drugs of Abuse Note  Disclamer   08/14/20  Unknown


 


Plasma/Serum Alcohol  < 0.01 % (0-0.07)   08/13/20  22:44    


 


Coronavirus (PCR)  Positive  (Negative)  A  08/15/20  Unknown











Schmidt/IV: 


                                        





Voiding Method                   External Female Catheter


IV Catheter Type [Right Upper    Peripheral IV


arm]                             


IV Catheter Type [Right          INT / Saline Lock


Antecubital]                     


IV Catheter Type [Right Foot]    INT / Saline Lock


IV Catheter Type [Right          Peripheral IV


External Jugular]                











Active Medications





- Current Medications


Current Medications: 














Generic Name Dose Route Start Last Admin





  Trade Name Freq  PRN Reason Stop Dose Admin


 


Acetaminophen  650 mg  08/14/20 08:00  08/21/20 12:10





  Tylenol  PO   650 mg





  Q4H PRN   Administration





  Pain MILD(1-3)/Fever >100.5/HA  


 


Amlodipine Besylate  10 mg  08/24/20 19:00  08/24/20 19:24





  Amlodipine  PO   10 mg





  QDAY KATHYA   Administration


 


Lipase/Protease/Amylase  1 each  08/17/20 10:34 





  Pancreaze  10,500 Unit  FEEDTUBE  





  PRN PRN  





  For Clogged Feeding Tube  


 


Benztropine Mesylate  1 mg  08/24/20 15:00  08/24/20 22:41





  Cogentin  PO   1 mg





  BID KATHYA   Administration


 


Clonidine HCl  0.2 mg  08/24/20 13:00  08/24/20 14:05





  Catapres-Tts Patch  TD   0.2 mg





  Mo KATHYA   Administration


 


Enoxaparin Sodium  40 mg  08/17/20 22:00  08/24/20 22:41





  Enoxaparin  SUB-Q   40 mg





  BID KATHYA   Administration


 


Haloperidol  5 mg  08/15/20 12:00  08/24/20 22:43





  Haldol  PO   5 mg





  BID KATHYA   Administration


 


Hydralazine HCl  100 mg  08/24/20 14:00  08/24/20 22:40





  Apresoline  PO   100 mg





  TID KATHYA   Administration


 


Hydralazine HCl  5 mg  08/24/20 16:44 





  Apresoline  IV  





  Q30MIN PRN  





  Hypertension  


 


Dextrose/Sodium Chloride  1,000 mls @ 42 mls/hr  08/23/20 22:00  08/24/20 18:29





  D5ns  IV   75 mls/hr





  AS DIRECT KATHYA   Administration


 


Insulin Human Lispro  0 unit  08/19/20 01:00  08/25/20 00:01





  Humalog  SUB-Q   3 unit





  Q6HR KATHYA   Administration





  Protocol  


 


Miscellaneous Medication  325 mg  08/15/20 18:45 





  Paliperidone Palmitate [Invega Sustenna]  IM  





  Q3W KATHYA  


 


Ondansetron HCl  4 mg  08/14/20 08:00 





  Zofran  IV  





  Q8H PRN  





  Nausea And Vomiting  


 


Prednisone  30 mg  08/25/20 10:00 





  Deltasone  PO  





  QDAY KATHYA  


 


Simple Syrup  15 ml  08/17/20 10:34 





  Simple Syrup  FEEDTUBE  





  PRN PRN  





  Hypoglycemia  


 


Simple Syrup  30 ml  08/17/20 10:34 





  Simple Syrup  FEEDTUBE  





  PRN PRN  





  Hypoglycemia  


 


Sodium Bicarbonate  325 mg  08/17/20 10:34 





  Sodium Bicarbonate  FEEDTUBE  





  PRN PRN  





  For Clogged Feeding Tube  


 


Sodium Chloride  10 ml  08/14/20 10:00  08/24/20 22:41





  Sodium Chloride Flush Syringe 10 Ml  IV   10 ml





  BID KATHYA   Administration


 


Sodium Chloride  10 ml  08/14/20 07:50 





  Sodium Chloride Flush Syringe 10 Ml  IV  08/27/20 07:49 





  PRN PRN  





  LINE FLUSH  


 


Trazodone HCl  50 mg  08/14/20 22:00  08/24/20 22:40





  Desyrel  PO   50 mg





  QHS KATHYA   Administration


 


Valproic Acid  1,500 mg  08/18/20 22:00  08/24/20 22:42





  Depakene Liq  PO   1,500 mg





  QHS KATHYA   Administration














Nutrition/Malnutrition Assess





- Dietary Evaluation


Nutrition/Malnutrition Findings: 


                                        





Nutrition Notes                                            Start:  08/17/20 

09:33


Freq:                                                      Status: Active       




Protocol:                                                                       




 Document     08/24/20 10:47  CULLEN  (Rec: 08/24/20 10:52  CULLEN  SRW-

[email or web address])


 Nutrition Notes


     Initial or Follow up                        Reassessment


     Other Pertinent Diagnosis                   COVID-19 (+), Schizoaffective


                                                 disorder


     Current Diet                                Pureed


     Labs/Tests                                  Reviewed


     Pertinent Medications                       D5NS at 75ml/hr


     Height                                      5 ft 6 in


     Weight                                      114.5 kg


     Ideal Body Weight (kg)                      59.09


     BMI                                         40.7


     Subjective/Other Information                Pt pulled NG tube out


                                                 yesterday.  Per RN note, pt


                                                 tolerated applesauce and sips


                                                 of water last pm.  SLP


                                                 evaluated pt on 8/19 and


                                                 deemed pt unsafe for PO intake


                                                 at that time.  Pt may need


                                                 PEG tube if unable to consume


                                                 adequate PO to meet needs.


     Burn                                        Absent


     Trauma                                      Absent


     #1


      Nutrition Diagnosis                        Inadequate oral intake


      Diagnosis Progress(for reassessment        Continues


       documentation)                            


     Is patient on ventilator?                   No


     Is Patient Ambulatory and/or Out of Bed     No


     REE-([facility]-confined to bed)      2112.012


     Kcal/Kg value to use for calculation        14


     Approximate Energy Requirements Using       1603


      kcal/Kg                                    


     Calculation Used for Recommendations        Kcal/kg


     Additional Notes                            Pro needs 0.8-1g/kg adjBW: 69-


                                                 87g/day


                                                 Fluid needs 1ml/kcal


 Nutrition Intervention


     Change Diet Order:                          Continue current diet order as


                                                 tolerated; add consistent CHO


                                                 modifier


     Goal #1                                     PO tolerance


     Follow-Up By:                               08/26/20


     Additional Comments                         F/U: PO tolerance, SLP


                                                 evaluation

## 2020-08-24 NOTE — GASTROENTEROLOGY PROGRESS NOTE
Assessment and Plan





- Patient Problems


(1) Neurogenic dysphagia


Current Visit: Yes   Status: Acute   


Plan to address problem: 


- Mental status improving and able to tolerate applesauce. No swallowing 

difficulty noted. 


- hold off PEG tube given improving mental status. 


- will sign off. please back as needed. 








Subjective


Date of service: 08/24/20


Principal diagnosis: Neurogenic Dysphagia


Interval history: 








Patient is COVID positive, chart reviewed. Patient not examined to conserve 

resources on PPE in this pandemic and to reduce the risk of exposure and or 

transmission of the disease.





Per nursing, patient is able to swallow applesauce. No swallowing difficulty 

noted. 





Objective





- Constitutional


Vitals: 


                                        











Temp Pulse Resp BP Pulse Ox


 


 98.0 F   115 H  22   178/108   95 


 


 08/24/20 12:05  08/24/20 14:05  08/24/20 12:05  08/24/20 14:05  08/24/20 12:05














- Labs


CBC & Chem 7: 


                                 08/23/20 10:50





                                 08/21/20 04:50


Labs: 


                         Laboratory Results - last 24 hr











  08/23/20 08/23/20 08/24/20





  16:59 23:00 06:04


 


POC Glucose  193 H  268 H  182 H














  08/24/20





  12:20


 


POC Glucose  139 H

## 2020-08-24 NOTE — PROGRESS NOTE
Assessment and Plan


Cultures:


Blood culture and urine culture negative


COVID PCR positive


 


Assessment: 55 years old female with history of paranoid schizophrenia, admitted

on 8/13/2020 due to altered mental status and catatonia state:





#Severe sepsis: Present on admission with low-grade fever, tachycardia, elevated

leukocytosis, WHITNEY, likely due to bilateral pneumonia. Resolved.





#Bilateral pneumonia: COVID-19 pneumonia.  Inflammatory markers are elevated, 

with a d-dimer > 10,000.  CT negative for pulmonary embolism. Repeat d-dimer 

with much improvement. Renal function and LFTs improving, but given mild hypoxia

and several days of positivity, unclear benefit with Remdesivir at this stage. 

Completed empiric CAP abx x 5 days.





#Acute hypoxemic respiratory failure: mild. on supplemental oxygen.





#Elevated LFTs: from COVID/rhabdo





#WHITNEY: from COVID. improved





#Elevated CK: Likely secondary to rhabdomyolysis possible due to COVID-19 

infection or catatonic state





#Paranoid schizophrenia with catatonia: Per psych





Recommendations:


-continue steroids, day 10 of 10


-supportive care and oxygen weaning as tolerated


-anticoagulation per protocol based on d-dimer which has been steadily improving





Blank Coppola MD, FACP


[facility] ([facility])


C: [phone]


O: [phone]


F: [phone]





Subjective


Date of service: 08/24/20


Principal diagnosis: Neurogenic Dysphagia


Interval history: 


No fever.  Oxygen requirements stable at 2 lit/min NC





Objective





- Exam


Narrative Exam: 





Physical Exam (reviewed in chart due to PPE conservation)


Constitutional: limited due to PPE conservation strategy


Head, Ears, Nose: limited due to PPE conservation strategy


Eyes: limited due to PPE conservation strategy


Neck: limited due to PPE conservation strategy


Oral: limited due to PPE conservation strategy


Cardiovascular: limited due to PPE conservation strategy


Respiratory: limited due to PPE conservation strategy


GI: limited due to PPE conservation strategy


Musculoskeletal: limited due to PPE conservation strategy


Skin: limited due to PPE conservation strategy


Hem/Lymphatic: limited due to PPE conservation strategy


Psych: limited due to PPE conservation strategy


Neurological: limited due to PPE conservation strategy       





- Constitutional


Vitals: 


                                   Vital Signs











Temp Pulse Resp BP Pulse Ox


 


 98.0 F   115 H  22   178/108   95 


 


 08/24/20 12:05  08/24/20 12:05  08/24/20 12:05  08/24/20 12:05  08/24/20 12:05








                           Temperature -Last 24 Hours











Temperature                    98.0 F


 


Temperature                    97.4 F


 


Temperature                    97.4 F


 


Temperature                    98.5 F

















- Labs


CBC & Chem 7: 


                                 08/23/20 10:50





                                 08/21/20 04:50


Labs: 


                              Abnormal lab results











  08/23/20 08/23/20 08/24/20 Range/Units





  16:59 23:00 06:04 


 


POC Glucose  193 H  268 H  182 H  ()  














  08/24/20 Range/Units





  12:20 


 


POC Glucose  139 H  ()

## 2020-08-25 RX ADMIN — HYDRALAZINE HYDROCHLORIDE SCH MG: 100 TABLET, FILM COATED ORAL at 23:49

## 2020-08-25 RX ADMIN — INSULIN LISPRO SCH UNIT: 100 INJECTION, SOLUTION INTRAVENOUS; SUBCUTANEOUS at 12:56

## 2020-08-25 RX ADMIN — ENOXAPARIN SODIUM SCH MG: 100 INJECTION SUBCUTANEOUS at 23:49

## 2020-08-25 RX ADMIN — INSULIN LISPRO SCH: 100 INJECTION, SOLUTION INTRAVENOUS; SUBCUTANEOUS at 06:24

## 2020-08-25 RX ADMIN — ENOXAPARIN SODIUM SCH MG: 100 INJECTION SUBCUTANEOUS at 09:47

## 2020-08-25 RX ADMIN — INSULIN LISPRO SCH UNIT: 100 INJECTION, SOLUTION INTRAVENOUS; SUBCUTANEOUS at 00:01

## 2020-08-25 RX ADMIN — HYDRALAZINE HYDROCHLORIDE SCH MG: 100 TABLET, FILM COATED ORAL at 12:59

## 2020-08-25 RX ADMIN — Medication SCH ML: at 23:49

## 2020-08-25 RX ADMIN — BENZTROPINE MESYLATE SCH MG: 1 TABLET ORAL at 23:49

## 2020-08-25 RX ADMIN — Medication SCH: at 09:48

## 2020-08-25 RX ADMIN — INSULIN LISPRO SCH UNIT: 100 INJECTION, SOLUTION INTRAVENOUS; SUBCUTANEOUS at 23:57

## 2020-08-25 RX ADMIN — BENZTROPINE MESYLATE SCH MG: 1 TABLET ORAL at 09:47

## 2020-08-25 RX ADMIN — VALPROIC ACID SCH MG: 250 SOLUTION ORAL at 23:49

## 2020-08-25 RX ADMIN — INSULIN LISPRO SCH UNIT: 100 INJECTION, SOLUTION INTRAVENOUS; SUBCUTANEOUS at 17:28

## 2020-08-25 RX ADMIN — HYDRALAZINE HYDROCHLORIDE SCH MG: 100 TABLET, FILM COATED ORAL at 09:48

## 2020-08-25 NOTE — PROGRESS NOTE
Assessment and Plan


Cultures:


Blood culture and urine culture negative


COVID PCR positive


 


Assessment: 55 years old female with history of paranoid schizophrenia, admitted

on 8/13/2020 due to altered mental status and catatonia state:





#Severe sepsis: Present on admission with low-grade fever, tachycardia, elevated

leukocytosis, WHITNEY, likely due to bilateral pneumonia. Resolved.





#Bilateral pneumonia: COVID-19 pneumonia.  Inflammatory markers are elevated, 

with a d-dimer > 10,000.  CT negative for pulmonary embolism. Repeat d-dimer 

with much improvement. Renal function and LFTs improving, but given mild hypoxia

and several days of positivity, unclear benefit with Remdesivir at this stage. 

Completed empiric CAP abx x 5 days. Completed steroids. 





#Acute hypoxemic respiratory failure: mild. on supplemental oxygen.





#Elevated LFTs: from COVID/rhabdo





#WHITNEY: from COVID. improved





#Elevated CK: Likely secondary to rhabdomyolysis possible due to COVID-19 

infection or catatonic state





#Paranoid schizophrenia with catatonia: Per psych





Recommendations:


-completed steroids. Overall, stable from ID standpoint


-anticoagulation per protocol based on d-dimer which has been steadily improving





Blank Coppola MD, FACP


[facility] ([facility])


C: [phone]


O: [phone]


F: [phone]





Subjective


Date of service: 08/25/20


Principal diagnosis: Neurogenic Dysphagia


Interval history: 


No fever.  Weaned to room air. 





Objective





- Exam


Narrative Exam: 


Physical Exam (reviewed in chart due to PPE conservation)


Constitutional: limited due to PPE conservation strategy


Head, Ears, Nose: limited due to PPE conservation strategy


Eyes: limited due to PPE conservation strategy


Neck: limited due to PPE conservation strategy


Oral: limited due to PPE conservation strategy


Cardiovascular: limited due to PPE conservation strategy


Respiratory: limited due to PPE conservation strategy


GI: limited due to PPE conservation strategy


Musculoskeletal: limited due to PPE conservation strategy


Skin: limited due to PPE conservation strategy


Hem/Lymphatic: limited due to PPE conservation strategy


Psych: limited due to PPE conservation strategy


Neurological: limited due to PPE conservation strategy       





- Constitutional


Vitals: 


                                   Vital Signs











Temp Pulse Resp BP Pulse Ox


 


 97.3 F L  109 H  18   131/65   97 


 


 08/25/20 12:55  08/25/20 12:55  08/25/20 12:55  08/25/20 12:55  08/25/20 12:55








                           Temperature -Last 24 Hours











Temperature                    97.3 F


 


Temperature                    98.2 F


 


Temperature                    97.9 F


 


Temperature                    98.0 F

















- Labs


CBC & Chem 7: 


                                 08/24/20 19:35





                                 08/24/20 19:35


Labs: 


                              Abnormal lab results











  08/24/20 08/24/20 08/24/20 Range/Units





  17:58 19:35 19:35 


 


WBC   14.3 H   (4.5-11.0)  K/mm3


 


Sodium    136 L D  (137-145)  mmol/L


 


Glucose    278 H  ()  mg/dL


 


POC Glucose  252 H    ()  














  08/24/20 08/25/20 08/25/20 Range/Units





  23:55 05:42 11:42 


 


WBC     (4.5-11.0)  K/mm3


 


Sodium     (137-145)  mmol/L


 


Glucose     ()  mg/dL


 


POC Glucose  211 H  143 H  177 H  ()

## 2020-08-25 NOTE — PROGRESS NOTE
Subjective





- Reason for Consult


Consult date: 08/25/20


Reason for consult: MHE


Requesting physician: CASA ULRICH





- Chief Complaint


Chief complaint: 


Psych HPI


Previously evaluated patient by my colleague for AMS, with no recommendation for

inpatient due to severe acute medical conditions such  as COVID, Rhabdo, Severe 

sepsis, Pna, WHITNEY, need of gastric tube feed. Patient referred for evaluation of 

pill rolling upper extemity tremors. Patient seen in room, appears somnolence, 

responds to loud verbal stimuli with motor movement by opening eyes and then 

goes back to sleep.








MENTAL STATUS EXAMINATION


General Appearance and Behavior: Age appropriate,good hygiene, wearing 

appropriate clothes, lying in bed,poor eye contact, uncooperative with 

questioning.


Cooperation: Withdrawn


Psychomotor Behavior: psychomotor retardation


Mood: na


Affect and affective range: flat


Thought Process: 


Thought Content: 


Intellectual Functioning: n/a


Suicidal Ideation: na


Homicidal Ideation: na


Impulse Control:na


Insight and Judgment: Impaired


Memory: na


Attention: na


Orientation: somnolence





RECOMMENDATIONS


Will hold off antipsychotics haldol. Okay to give PO olazanpine if patient able 

to swallow. 





MEDICATIONS


     Changed depakote dr to depakene liquid since the patient was unable to 

swallow


Risks, benefits and alternatives of medications discussed with the patient, 

questions answered and consent obtained from patient.


PSYCHOTHERAPY: Supportive psychotherapy provided


MEDICAL: Per primary team


DELIRIUM PRECAUTIONS: Please re-orient patient frequently, keep lights on during

the day, and minimize benzodiazepines and opiates as these medications could 

worsen patient's confusion.


SAFETY SITTER: per medical team


DISPOSITION: Do not recommend acute inpatient psychiatric treatment at this time

due to acute medical conditions (COVID, Rhabdo, Severe sepsis, Pna, WHITNEY, need of

gastric tube feed). Please re-consult once, and if the patient improves 

medically. She has acute and complex medical conditions that supersede her need 

for inpatient psychiatric treatment at this time.


Will sign off. Thank you for the consult. Please contact with any questions 

and/or concerns.








Mental Status Exam





- Vital signs


                                Last Vital Signs











Temp  98.2 F   08/25/20 05:28


 


Pulse  77   08/25/20 05:28


 


Resp  20   08/25/20 05:28


 


BP  117/85   08/25/20 05:28


 


Pulse Ox  94   08/25/20 05:28

## 2020-08-25 NOTE — PROGRESS NOTE
<KWAKUPADMA LAURENMarco A - Last Filed: 08/25/20 16:49>





Assessment and Plan





- Patient Problems


(1) Severe sepsis


Current Visit: Yes   Status: Resolved   


Plan to address problem: 


- Presented with low-grade fever, tachycardia, leukocytosis, WHITNEY, and bilateral 

pneumonia


- Likely source is bilateral pneumonia secondary to COVID-19


- IV antibiotics completed


- Covid protocol initiated











(2) Pneumonia due to COVID-19 virus


Current Visit: Yes   Status: Acute   


Plan to address problem: 


- COVID protocol initiated


- IV azithromycin and Rocephin completed


- 8/14 COVID PCR positive


- Trend LDH, CRP, procalcitonin, d-dimer, ferritin 


- Droplet /contact precautions


- Supplemental oxygenation as needed


- Infectious disease consult requested, signed off at this time


- Pulmonary hygiene


- In setting of elevated D-Dimer, CTA Chest was ordered which was negative for 

pulmonary embolism


- Anticoagulation per COVID protocol


- Cannot initiate Remdesivir in setting on of elevated LFTs


- IV solumedrel changed to PO prednisone and started on taper








(3) Paranoid schizophrenia


Current Visit: Yes   Status: Chronic   


Plan to address problem: 


-History of present schizophrenia


-8/24 Psych reconsulted


-Depakote and Haldol 


-Sleep hygiene


-Reorientation as needed








(4) Leukocytosis


Current Visit: Yes   Status: Acute   


Plan to address problem: 


- Admit WBC 13.8


- Trend CBC


- Maybe secondary to possible PNA or from dehydration


- IV abx completed


- Also on steroids for COVID pneumonia


- Now trending down








(5) Hypertension


Current Visit: Yes   Status: Chronic   


Plan to address problem: 


- BP monitoring per protocol


- PRN hydralizine


- Clonidine TD and hydralazine PO








(6) DVT prophylaxis


Current Visit: Yes   Status: Acute   


Plan to address problem: 


- SCDs to BLE while in bed


- Lovenox subq twice daily per COVID protocol








History


Interval history: 


This is a 55-year-old female with paranoid schizophrenia that presents to the 

emergency department on 8/13 for altered mental status in a catatonic state.  

Previous hospitalizations reviewed and only notes a history of paranoid 

schizophrenia. Patient may be a resident of [place].  Patient is currently 

nonverbal and HPI is received from ER documentation.  Work-up in the emergency 

department included a CT abdomen pelvis which shows patchy peripheral 

groundglass opacities bilateral lungs which is consistent with either viral or 

atypical pneumonia, positive anterior lateral fat hernia in the left mid abdomen

and a large uterine fibroids however her CXR showed no acute pulmonary or 

pleural abnormalities.  She was found to have acute kidney injury with a 

creatinine of 1.4/BUN 33 as baseline from previous records seems to be 0.7, 

rhabdomyolysis with a creatinine kinase of 33,481, leukocytosis with a WBC of 

13.8, thrombocytopenia with platelets at 97.  She has COVID pneumonia with IV 

antibiotics, rhabdomyolysis, and WHITNEY. Infectious disease, psych, and neurology 

have been consulted. Neurology does not suspect NMS at this time. Psych has 

signed off at this time due to ongoing acute medical processes.  PT/ST consulted

for evaluation but unable to be performed as patient will not follow verbal 

commands.  She will put her eyes to verbal stimuli and intermittently follow 

commands.  Patient remains nonverbal and only groans/moans to painful stimuli.  

Psych was reconsulted today for evaluation. 





* 8/24: pureed diet


* 8/21: continue care


* 8/20: GI consult for peg


* 8/19: continue care


* Discussed with primary caretaker patient was normally verbal but sometimes goe

  s into a catatonic state.  Phone number for this is [phone]


* 8/18: PT consulted.  Psych signed off.  Continue treatment


* 8/17: continue treatment


* 8/16: Change fluids to NS 1/2 AT 125CC/hr. Rhabdomylysis improving, insert NGT

  and start tube feeds. Psych input noted. Continue management for COVID 19. ID 

  consulted. No new fever.  CTA chest and CTH obtained which were both negative


* 8/15: COVID PCR Positive








Hospitalist Physical





- Constitutional


Vitals: 


                                        











Temp Pulse Resp BP Pulse Ox


 


 97.2 F L  92 H  19   135/73   93 


 


 08/25/20 16:00  08/25/20 16:00  08/25/20 16:00  08/25/20 16:00 08/25/20 16:00











General appearance: Present: no acute distress, obese





- EENT


Eyes: Present: PERRL


ENT: hearing intact





- Neck


Neck: Present: normal ROM





- Respiratory


Respiratory effort: normal


Respiratory: bilateral: CTA





- Cardiovascular


Rhythm: regular


Heart Sounds: Present: S1 & S2.  Absent: systolic murmur, diastolic murmur





- Extremities


Extremities: no ischemia, pulses intact, pulses symmetrical, No edema, normal 

temperature, normal color, Full ROM


Peripheral Pulses: within normal limits





- Abdominal


General gastrointestinal: soft, non-tender, non-distended, normal bowel sounds





- Integumentary


Integumentary: Present: clear, warm, dry





- Psychiatric


Psychiatric: cooperative





- Neurologic


Neurologic: moves all extremities





- Allied Health


Allied health notes reviewed: nursing, PT, ST, social work, case management





Results





- Labs


CBC & Chem 7: 


                                 08/24/20 19:35





                                 08/24/20 19:35


Labs: 


                             Laboratory Last Values











WBC  14.3 K/mm3 (4.5-11.0)  H  08/24/20  19:35    


 


RBC  4.15 M/mm3 (3.65-5.03)   08/24/20  19:35    


 


Hgb  12.3 gm/dl (10.1-14.3)   08/24/20  19:35    


 


Hct  36.6 % (30.3-42.9)   08/24/20  19:35    


 


MCV  88 fl (79-97)   08/24/20  19:35    


 


MCH  30 pg (28-32)   08/24/20  19:35    


 


MCHC  34 % (30-34)   08/24/20  19:35    


 


RDW  15.2 % (13.2-15.2)   08/24/20  19:35    


 


Plt Count  150 K/mm3 (140-440)   08/24/20  19:35    


 


Lymph % (Auto)  10.6 % (13.4-35.0)  L  08/13/20  22:44    


 


Mono % (Auto)  9.8 % (0.0-7.3)  H  08/13/20  22:44    


 


Eos % (Auto)  0.0 % (0.0-4.3)   08/13/20  22:44    


 


Baso % (Auto)  1.2 % (0.0-1.8)   08/13/20  22:44    


 


Lymph #  1.5 K/mm3 (1.2-5.4)   08/13/20  22:44    


 


Mono #  1.3 K/mm3 (0.0-0.8)  H  08/13/20  22:44    


 


Eos #  0.0 K/mm3 (0.0-0.4)   08/13/20  22:44    


 


Baso #  0.2 K/mm3 (0.0-0.1)  H  08/13/20  22:44    


 


Add Manual Diff  Complete   08/21/20  04:50    


 


Total Counted  100   08/21/20  04:50    


 


Seg Neutrophils %  78.4 % (40.0-70.0)  H  08/13/20  22:44    


 


Seg Neuts % (Manual)  87.0 % (40.0-70.0)  H  08/21/20  04:50    


 


Band Neutrophils %  4.0 %  08/21/20  04:50    


 


Lymphocytes % (Manual)  2.0 % (13.4-35.0)  L  08/21/20  04:50    


 


Reactive Lymphs % (Man)  0 %  08/21/20  04:50    


 


Monocytes % (Manual)  6.0 % (0.0-7.3)   08/21/20  04:50    


 


Eosinophils % (Manual)  0 % (0.0-4.3)   08/21/20  04:50    


 


Basophils % (Manual)  0 % (0.0-1.8)   08/21/20  04:50    


 


Metamyelocytes %  1.0 %  08/21/20  04:50    


 


Myelocytes %  0 %  08/21/20  04:50    


 


Promyelocytes %  0 %  08/21/20  04:50    


 


Blast Cells %  0 %  08/21/20  04:50    


 


Nucleated RBC %  1.0 % (0.0-0.9)  H  08/21/20  04:50    


 


Seg Neutrophils #  10.8 K/mm3 (1.8-7.7)  H  08/13/20  22:44    


 


Seg Neutrophils # Man  14.4 K/mm3 (1.8-7.7)  H  08/21/20  04:50    


 


Band Neutrophils #  0.7 K/mm3  08/21/20  04:50    


 


Lymphocytes # (Manual)  0.3 K/mm3 (1.2-5.4)  L  08/21/20  04:50    


 


Abs React Lymphs (Man)  0.0 K/mm3  08/21/20  04:50    


 


Monocytes # (Manual)  1.0 K/mm3 (0.0-0.8)  H  08/21/20  04:50    


 


Eosinophils # (Manual)  0.0 K/mm3 (0.0-0.4)   08/21/20  04:50    


 


Basophils # (Manual)  0.0 K/mm3 (0.0-0.1)   08/21/20  04:50    


 


Metamyelocytes #  0.2 K/mm3  08/21/20  04:50    


 


Myelocytes #  0.0 K/mm3  08/21/20  04:50    


 


Promyelocytes #  0.0 K/mm3  08/21/20  04:50    


 


Blast Cells #  0.0 K/mm3  08/21/20  04:50    


 


WBC Morphology  Not Reportable   08/21/20  04:50    


 


Hypersegmented Neuts  Not Reportable   08/21/20  04:50    


 


Hyposegmented Neuts  Not Reportable   08/21/20  04:50    


 


Hypogranular Neuts  Not Reportable   08/21/20  04:50    


 


Smudge Cells  Not Reportable   08/21/20  04:50    


 


Toxic Granulation  Not Reportable   08/21/20  04:50    


 


Toxic Vacuolation  Not Reportable   08/21/20  04:50    


 


Dohle Bodies  Not Reportable   08/21/20  04:50    


 


Pelger-Huet Anomaly  Not Reportable   08/21/20  04:50    


 


Arnie Rods  Not Reportable   08/21/20  04:50    


 


Platelet Estimate  Consistent w auto   08/21/20  04:50    


 


Clumped Platelets  Not Reportable   08/21/20  04:50    


 


Plt Clumps, EDTA  Not Reportable   08/21/20  04:50    


 


Large Platelets  Not Reportable   08/21/20  04:50    


 


Giant Platelets  Not Reportable   08/21/20  04:50    


 


Platelet Satelliting  Not Reportable   08/21/20  04:50    


 


Plt Morphology Comment  Not Reportable   08/21/20  04:50    


 


RBC Morphology  Not Reportable   08/21/20  04:50    


 


Dimorphic RBCs  Not Reportable   08/21/20  04:50    


 


Polychromasia  Not Reportable   08/21/20  04:50    


 


Hypochromasia  Not Reportable   08/21/20  04:50    


 


Poikilocytosis  Not Reportable   08/21/20  04:50    


 


Anisocytosis  Not Reportable   08/21/20  04:50    


 


Microcytosis  Not Reportable   08/21/20  04:50    


 


Macrocytosis  1+   08/21/20  04:50    


 


Spherocytes  Not Reportable   08/21/20  04:50    


 


Pappenheimer Bodies  Not Reportable   08/21/20  04:50    


 


Sickle Cells  Not Reportable   08/21/20  04:50    


 


Target Cells  1+   08/21/20  04:50    


 


Tear Drop Cells  Not Reportable   08/21/20  04:50    


 


Ovalocytes  Not Reportable   08/21/20  04:50    


 


Helmet Cells  Not Reportable   08/21/20  04:50    


 


[place] Bodies  Not Reportable   08/21/20  04:50    


 


Cabot Rings  Not Reportable   08/21/20  04:50    


 


Trosper Cells  Not Reportable   08/21/20  04:50    


 


Bite Cells  Not Reportable   08/21/20  04:50    


 


Crenated Cell  Not Reportable   08/21/20  04:50    


 


Elliptocytes  Not Reportable   08/21/20  04:50    


 


Acanthocytes (Spur)  Not Reportable   08/21/20  04:50    


 


Rouleaux  Not Reportable   08/21/20  04:50    


 


Hemoglobin C Crystals  Not Reportable   08/21/20  04:50    


 


Schistocytes  Not Reportable   08/21/20  04:50    


 


Malaria parasites  Not Reportable   08/21/20  04:50    


 


[place] Bodies  Not Reportable   08/21/20  04:50    


 


Hem Pathologist Commnt  No   08/21/20  04:50    


 


PT  14.9 Sec. (12.2-14.9)   08/20/20  15:23    


 


INR  1.15  (0.87-1.13)  H  08/20/20  15:23    


 


APTT  29.5 Sec. (24.2-36.6)   08/20/20  15:23    


 


D-Dimer  390.46 ng/mlDDU (0-234)  H  08/22/20  09:32    


 


Sodium  136 mmol/L (137-145)  L D 08/24/20  19:35    


 


Potassium  4.5 mmol/L (3.6-5.0)   08/24/20  19:35    


 


Chloride  98.8 mmol/L ()   08/24/20  19:35    


 


Carbon Dioxide  25 mmol/L (22-30)   08/24/20  19:35    


 


Anion Gap  17 mmol/L  08/24/20  19:35    


 


BUN  12 mg/dL (7-17)   08/24/20  19:35    


 


Creatinine  0.6 mg/dL (0.6-1.2)   08/24/20  19:35    


 


Estimated GFR  > 60 ml/min  08/24/20  19:35    


 


BUN/Creatinine Ratio  20 %  08/24/20  19:35    


 


Glucose  278 mg/dL ()  H  08/24/20  19:35    


 


POC Glucose  217  ()  H  08/25/20  16:33    


 


Lactic Acid  1.30 mmol/L (0.7-2.0)   08/13/20  22:44    


 


Calcium  8.5 mg/dL (8.4-10.2)   08/24/20  19:35    


 


Ferritin  991.6 ng/mL (10.0-200.0)  H  08/22/20  09:32    


 


Total Bilirubin  0.40 mg/dL (0.1-1.2)   08/18/20  08:32    


 


Direct Bilirubin  0.3 mg/dL (0-0.2)  H  08/13/20  22:44    


 


Indirect Bilirubin  0.2 mg/dL  08/13/20  22:44    


 


AST  149 units/L (5-40)  H  08/18/20  08:32    


 


ALT  96 units/L (7-56)  H  08/18/20  08:32    


 


Alkaline Phosphatase  40 units/L ()   08/18/20  08:32    


 


Lactate Dehydrogenase  716 units/L ()  H  08/22/20  09:32    


 


Total Creatine Kinase  934 units/L ()  H  08/18/20  08:32    


 


C-Reactive Protein  0.80 mg/dL (0.00-1.30)   08/22/20  09:32    


 


Total Protein  6.1 g/dL (6.3-8.2)  L  08/18/20  08:32    


 


Albumin  2.5 g/dL (3.9-5)  L  08/18/20  08:32    


 


Albumin/Globulin Ratio  0.7 %  08/18/20  08:32    


 


Procalcitonin  0.71 ng/mL (<0.15)   08/18/20  08:32    


 


Urine Color  Tiffany  (Yellow)   08/14/20  Unknown


 


Urine Turbidity  Slightly-cloudy  (Clear)   08/14/20  Unknown


 


Urine pH  5.0  (5.0-7.0)   08/14/20  Unknown


 


Ur Specific Gravity  1.026  (1.003-1.030)   08/14/20  Unknown


 


Urine Protein  100 mg/dl mg/dL (Negative)   08/14/20  Unknown


 


Urine Glucose (UA)  Neg mg/dL (Negative)   08/14/20  Unknown


 


Urine Ketones  Tr mg/dL (Negative)   08/14/20  Unknown


 


Urine Blood  Lg  (Negative)   08/14/20  Unknown


 


Urine Nitrite  Neg  (Negative)   08/14/20  Unknown


 


Urine Bilirubin  Neg  (Negative)   08/14/20  Unknown


 


Urine Urobilinogen  2.0 mg/dL (<2.0)   08/14/20  Unknown


 


Ur Leukocyte Esterase  Neg  (Negative)   08/14/20  Unknown


 


Urine WBC (Auto)  17.0 /HPF (0.0-6.0)  H  08/14/20  Unknown


 


Urine RBC (Auto)  1.0 /HPF (0.0-6.0)   08/14/20  Unknown


 


U Epithel Cells (Auto)  4.0 /HPF (0-13.0)   08/14/20  Unknown


 


Urine Mucus  2+ /HPF  08/14/20  Unknown


 


Salicylates  < 0.3 mg/dL (2.8-20.0)  L  08/13/20  22:44    


 


Urine Opiates Screen  Presumptive negative   08/14/20  Unknown


 


Urine Methadone Screen  Presumptive negative   08/14/20  Unknown


 


Acetaminophen  5.0 ug/mL (10.0-30.0)  L  08/13/20  22:44    


 


Ur Barbiturates Screen  Presumptive negative   08/14/20  Unknown


 


Valproic Acid  31.8 ug/mL ()  L  08/17/20  08:46    


 


Ur Phencyclidine Scrn  Presumptive negative   08/14/20  Unknown


 


Ur Amphetamines Screen  Presumptive negative   08/14/20  Unknown


 


U Benzodiazepines Scrn  Presumptive negative   08/14/20  Unknown


 


Urine Cocaine Screen  Presumptive negative   08/14/20  Unknown


 


U Marijuana (THC) Screen  Presumptive negative   08/14/20  Unknown


 


Drugs of Abuse Note  Disclamer   08/14/20  Unknown


 


Plasma/Serum Alcohol  < 0.01 % (0-0.07)   08/13/20  22:44    


 


Coronavirus (PCR)  Positive  (Negative)  A  08/15/20  Unknown











Schmidt/IV: 


                                        





Voiding Method                   External Female Catheter


IV Catheter Type [Right Upper    Peripheral IV


arm]                             


IV Catheter Type [Right          INT / Saline Lock


Antecubital]                     


IV Catheter Type [Right Foot]    INT / Saline Lock


IV Catheter Type [Right          Peripheral IV


External Jugular]                











Active Medications





- Current Medications


Current Medications: 














Generic Name Dose Route Start Last Admin





  Trade Name Freq  PRN Reason Stop Dose Admin


 


Acetaminophen  650 mg  08/14/20 08:00  08/21/20 12:10





  Tylenol  PO   650 mg





  Q4H PRN   Administration





  Pain MILD(1-3)/Fever >100.5/HA  


 


Amlodipine Besylate  10 mg  08/24/20 19:00  08/25/20 09:47





  Amlodipine  PO   10 mg





  QDAY KATHYA   Administration


 


Lipase/Protease/Amylase  1 each  08/17/20 10:34 





  Pancreaze Dr 10,500 Unit  FEEDTUBE  





  PRN PRN  





  For Clogged Feeding Tube  


 


Benztropine Mesylate  1 mg  08/24/20 15:00  08/25/20 09:47





  Cogentin  PO   1 mg





  BID KATHYA   Administration


 


Clonidine HCl  0.2 mg  08/24/20 13:00  08/24/20 14:05





  Catapres-Tts Patch  TD   0.2 mg





  Mo KATHYA   Administration


 


Enoxaparin Sodium  40 mg  08/17/20 22:00  08/25/20 09:47





  Enoxaparin  SUB-Q   40 mg





  BID KATHYA   Administration


 


Hydralazine HCl  100 mg  08/24/20 14:00  08/25/20 12:59





  Apresoline  PO   100 mg





  TID KATHYA   Administration


 


Hydralazine HCl  5 mg  08/24/20 16:44 





  Apresoline  IV  





  Q30MIN PRN  





  Hypertension  


 


Dextrose/Sodium Chloride  1,000 mls @ 42 mls/hr  08/23/20 22:00  08/24/20 18:29





  D5ns  IV   75 mls/hr





  AS DIRECT KATHYA   Administration


 


Insulin Human Lispro  0 unit  08/19/20 01:00  08/25/20 12:56





  Humalog  SUB-Q   2 unit





  Q6HR KATHYA   Administration





  Protocol  


 


Miscellaneous Medication  325 mg  08/15/20 18:45 





  Paliperidone Palmitate [Invega Sustenna]  IM  





  Q3W [facility]  


 


Olanzapine  5 mg  08/25/20 22:00 





  Zyprexa  PO  





  QHS [facility]  


 


Ondansetron HCl  4 mg  08/14/20 08:00 





  Zofran  IV  





  Q8H PRN  





  Nausea And Vomiting  


 


Prednisone  30 mg  08/25/20 10:00  08/25/20 09:48





  Deltasone  PO   30 mg





  QDAY KATHYA   Administration


 


Simple Syrup  15 ml  08/17/20 10:34 





  Simple Syrup  FEEDTUBE  





  PRN PRN  





  Hypoglycemia  


 


Simple Syrup  30 ml  08/17/20 10:34 





  Simple Syrup  FEEDTUBE  





  PRN PRN  





  Hypoglycemia  


 


Sodium Bicarbonate  325 mg  08/17/20 10:34 





  Sodium Bicarbonate  FEEDTUBE  





  PRN PRN  





  For Clogged Feeding Tube  


 


Sodium Chloride  10 ml  08/14/20 10:00  08/25/20 09:48





  Sodium Chloride Flush Syringe 10 Ml  IV   Not Given





  BID KATHYA  


 


Sodium Chloride  10 ml  08/14/20 07:50 





  Sodium Chloride Flush Syringe 10 Ml  IV  08/27/20 07:49 





  PRN PRN  





  LINE FLUSH  


 


Trazodone HCl  50 mg  08/14/20 22:00  08/24/20 22:40





  Desyrel  PO   50 mg





  QHS KATHYA   Administration


 


Valproic Acid  1,500 mg  08/18/20 22:00  08/24/20 22:42





  Depakene Liq  PO   1,500 mg





  QHS KATHYA   Administration














Nutrition/Malnutrition Assess





- Dietary Evaluation


Nutrition/Malnutrition Findings: 


                                        





Nutrition Notes                                            Start:  08/17/20 

09:33


Freq:                                                      Status: Active       




Protocol:                                                                       




 Document     08/24/20 10:47  CULLEN  (Rec: 08/24/20 10:52  UCLLEN  

[id number])


 Nutrition Notes


     Initial or Follow up                        Reassessment


     Other Pertinent Diagnosis                   COVID-19 (+), Schizoaffective


                                                 disorder


     Current Diet                                Pureed


     Labs/Tests                                  Reviewed


     Pertinent Medications                       D5NS at 75ml/hr


     Height                                      5 ft 6 in


     Weight                                      114.5 kg


     Ideal Body Weight (kg)                      59.09


     BMI                                         40.7


     Subjective/Other Information                Pt pulled NG tube out


                                                 yesterday.  Per RN note, pt


                                                 tolerated applesauce and sips


                                                 of water last pm.  SLP


                                                 evaluated pt on 8/19 and


                                                 deemed pt unsafe for PO intake


                                                 at that time.  Pt may need


                                                 PEG tube if unable to consume


                                                 adequate PO to meet needs.


     Burn                                        Absent


     Trauma                                      Absent


     #1


      Nutrition Diagnosis                        Inadequate oral intake


      Diagnosis Progress(for reassessment        Continues


       documentation)                            


     Is patient on ventilator?                   No


     Is Patient Ambulatory and/or Out of Bed     No


     REE-([facility]-confined to bed)      2112.012


     Kcal/Kg value to use for calculation        14


     Approximate Energy Requirements Using       1603


      kcal/Kg                                    


     Calculation Used for Recommendations        Kcal/kg


     Additional Notes                            Pro needs 0.8-1g/kg adjBW: 69-


                                                 87g/day


                                                 Fluid needs 1ml/kcal


 Nutrition Intervention


     Change Diet Order:                          Continue current diet order as


                                                 tolerated; add consistent CHO


                                                 modifier


     Goal #1                                     PO tolerance


     Follow-Up By:                               08/26/20


     Additional Comments                         F/U: PO tolerance, SLP


                                                 evaluation











<CASA ULRICH - Last Filed: 08/26/20 07:24>





Assessment and Plan


Assessment and plan: 





I saw and evaluated the patient. I agree with the findings and the plan of care 

as documented in the Nurse Practitioner's~note, with the following corrections 

and additions.


awaiting placement





Hospitalist Physical





- Constitutional


Vitals: 


                                        











Temp Pulse Resp BP Pulse Ox


 


 98.3 F   86   16   128/88   97 


 


 08/26/20 06:29  08/26/20 06:29  08/26/20 06:29  08/26/20 06:29  08/26/20 06:29














Results





- Labs


CBC & Chem 7: 


                                 08/24/20 19:35





                                 08/24/20 19:35


Labs: 


                             Laboratory Last Values











WBC  14.3 K/mm3 (4.5-11.0)  H  08/24/20  19:35    


 


RBC  4.15 M/mm3 (3.65-5.03)   08/24/20  19:35    


 


Hgb  12.3 gm/dl (10.1-14.3)   08/24/20  19:35    


 


Hct  36.6 % (30.3-42.9)   08/24/20  19:35    


 


MCV  88 fl (79-97)   08/24/20  19:35    


 


MCH  30 pg (28-32)   08/24/20  19:35    


 


MCHC  34 % (30-34)   08/24/20  19:35    


 


RDW  15.2 % (13.2-15.2)   08/24/20  19:35    


 


Plt Count  150 K/mm3 (140-440)   08/24/20  19:35    


 


Lymph % (Auto)  10.6 % (13.4-35.0)  L  08/13/20  22:44    


 


Mono % (Auto)  9.8 % (0.0-7.3)  H  08/13/20  22:44    


 


Eos % (Auto)  0.0 % (0.0-4.3)   08/13/20  22:44    


 


Baso % (Auto)  1.2 % (0.0-1.8)   08/13/20  22:44    


 


Lymph #  1.5 K/mm3 (1.2-5.4)   08/13/20  22:44    


 


Mono #  1.3 K/mm3 (0.0-0.8)  H  08/13/20  22:44    


 


Eos #  0.0 K/mm3 (0.0-0.4)   08/13/20  22:44    


 


Baso #  0.2 K/mm3 (0.0-0.1)  H  08/13/20  22:44    


 


Add Manual Diff  Complete   08/21/20  04:50    


 


Total Counted  100   08/21/20  04:50    


 


Seg Neutrophils %  78.4 % (40.0-70.0)  H  08/13/20  22:44    


 


Seg Neuts % (Manual)  87.0 % (40.0-70.0)  H  08/21/20  04:50    


 


Band Neutrophils %  4.0 %  08/21/20  04:50    


 


Lymphocytes % (Manual)  2.0 % (13.4-35.0)  L  08/21/20  04:50    


 


Reactive Lymphs % (Man)  0 %  08/21/20  04:50    


 


Monocytes % (Manual)  6.0 % (0.0-7.3)   08/21/20  04:50    


 


Eosinophils % (Manual)  0 % (0.0-4.3)   08/21/20  04:50    


 


Basophils % (Manual)  0 % (0.0-1.8)   08/21/20  04:50    


 


Metamyelocytes %  1.0 %  08/21/20  04:50    


 


Myelocytes %  0 %  08/21/20  04:50    


 


Promyelocytes %  0 %  08/21/20  04:50    


 


Blast Cells %  0 %  08/21/20  04:50    


 


Nucleated RBC %  1.0 % (0.0-0.9)  H  08/21/20  04:50    


 


Seg Neutrophils #  10.8 K/mm3 (1.8-7.7)  H  08/13/20  22:44    


 


Seg Neutrophils # Man  14.4 K/mm3 (1.8-7.7)  H  08/21/20  04:50    


 


Band Neutrophils #  0.7 K/mm3  08/21/20  04:50    


 


Lymphocytes # (Manual)  0.3 K/mm3 (1.2-5.4)  L  08/21/20  04:50    


 


Abs React Lymphs (Man)  0.0 K/mm3  08/21/20  04:50    


 


Monocytes # (Manual)  1.0 K/mm3 (0.0-0.8)  H  08/21/20  04:50    


 


Eosinophils # (Manual)  0.0 K/mm3 (0.0-0.4)   08/21/20  04:50    


 


Basophils # (Manual)  0.0 K/mm3 (0.0-0.1)   08/21/20  04:50    


 


Metamyelocytes #  0.2 K/mm3  08/21/20  04:50    


 


Myelocytes #  0.0 K/mm3  08/21/20  04:50    


 


Promyelocytes #  0.0 K/mm3  08/21/20  04:50    


 


Blast Cells #  0.0 K/mm3  08/21/20  04:50    


 


WBC Morphology  Not Reportable   08/21/20  04:50    


 


Hypersegmented Neuts  Not Reportable   08/21/20  04:50    


 


Hyposegmented Neuts  Not Reportable   08/21/20  04:50    


 


Hypogranular Neuts  Not Reportable   08/21/20  04:50    


 


Smudge Cells  Not Reportable   08/21/20  04:50    


 


Toxic Granulation  Not Reportable   08/21/20  04:50    


 


Toxic Vacuolation  Not Reportable   08/21/20  04:50    


 


Dohle Bodies  Not Reportable   08/21/20  04:50    


 


Pelger-Huet Anomaly  Not Reportable   08/21/20  04:50    


 


Arnie Rods  Not Reportable   08/21/20  04:50    


 


Platelet Estimate  Consistent w auto   08/21/20  04:50    


 


Clumped Platelets  Not Reportable   08/21/20  04:50    


 


Plt Clumps, EDTA  Not Reportable   08/21/20  04:50    


 


Large Platelets  Not Reportable   08/21/20  04:50    


 


Giant Platelets  Not Reportable   08/21/20  04:50    


 


Platelet Satelliting  Not Reportable   08/21/20  04:50    


 


Plt Morphology Comment  Not Reportable   08/21/20  04:50    


 


RBC Morphology  Not Reportable   08/21/20  04:50    


 


Dimorphic RBCs  Not Reportable   08/21/20  04:50    


 


Polychromasia  Not Reportable   08/21/20  04:50    


 


Hypochromasia  Not Reportable   08/21/20  04:50    


 


Poikilocytosis  Not Reportable   08/21/20  04:50    


 


Anisocytosis  Not Reportable   08/21/20  04:50    


 


Microcytosis  Not Reportable   08/21/20  04:50    


 


Macrocytosis  1+   08/21/20  04:50    


 


Spherocytes  Not Reportable   08/21/20  04:50    


 


Pappenheimer Bodies  Not Reportable   08/21/20  04:50    


 


Sickle Cells  Not Reportable   08/21/20  04:50    


 


Target Cells  1+   08/21/20  04:50    


 


Tear Drop Cells  Not Reportable   08/21/20  04:50    


 


Ovalocytes  Not Reportable   08/21/20  04:50    


 


Helmet Cells  Not Reportable   08/21/20  04:50    


 


[place] Bodies  Not Reportable   08/21/20  04:50    


 


Cabot Rings  Not Reportable   08/21/20  04:50    


 


Trosper Cells  Not Reportable   08/21/20  04:50    


 


Bite Cells  Not Reportable   08/21/20  04:50    


 


Crenated Cell  Not Reportable   08/21/20  04:50    


 


Elliptocytes  Not Reportable   08/21/20  04:50    


 


Acanthocytes (Spur)  Not Reportable   08/21/20  04:50    


 


Rouleaux  Not Reportable   08/21/20  04:50    


 


Hemoglobin C Crystals  Not Reportable   08/21/20  04:50    


 


Schistocytes  Not Reportable   08/21/20  04:50    


 


Malaria parasites  Not Reportable   08/21/20  04:50    


 


[place] Bodies  Not Reportable   08/21/20  04:50    


 


Hem Pathologist Commnt  No   08/21/20  04:50    


 


PT  14.9 Sec. (12.2-14.9)   08/20/20  15:23    


 


INR  1.15  (0.87-1.13)  H  08/20/20  15:23    


 


APTT  29.5 Sec. (24.2-36.6)   08/20/20  15:23    


 


D-Dimer  390.46 ng/mlDDU (0-234)  H  08/22/20  09:32    


 


Sodium  136 mmol/L (137-145)  L D 08/24/20  19:35    


 


Potassium  4.5 mmol/L (3.6-5.0)   08/24/20  19:35    


 


Chloride  98.8 mmol/L ()   08/24/20  19:35    


 


Carbon Dioxide  25 mmol/L (22-30)   08/24/20  19:35    


 


Anion Gap  17 mmol/L  08/24/20  19:35    


 


BUN  12 mg/dL (7-17)   08/24/20  19:35    


 


Creatinine  0.6 mg/dL (0.6-1.2)   08/24/20  19:35    


 


Estimated GFR  > 60 ml/min  08/24/20  19:35    


 


BUN/Creatinine Ratio  20 %  08/24/20  19:35    


 


Glucose  278 mg/dL ()  H  08/24/20  19:35    


 


POC Glucose  170  ()  H  08/25/20  22:02    


 


Lactic Acid  1.30 mmol/L (0.7-2.0)   08/13/20  22:44    


 


Calcium  8.5 mg/dL (8.4-10.2)   08/24/20  19:35    


 


Ferritin  991.6 ng/mL (10.0-200.0)  H  08/22/20  09:32    


 


Total Bilirubin  0.40 mg/dL (0.1-1.2)   08/18/20  08:32    


 


Direct Bilirubin  0.3 mg/dL (0-0.2)  H  08/13/20  22:44    


 


Indirect Bilirubin  0.2 mg/dL  08/13/20  22:44    


 


AST  149 units/L (5-40)  H  08/18/20  08:32    


 


ALT  96 units/L (7-56)  H  08/18/20  08:32    


 


Alkaline Phosphatase  40 units/L ()   08/18/20  08:32    


 


Lactate Dehydrogenase  716 units/L ()  H  08/22/20  09:32    


 


Total Creatine Kinase  934 units/L ()  H  08/18/20  08:32    


 


C-Reactive Protein  0.80 mg/dL (0.00-1.30)   08/22/20  09:32    


 


Total Protein  6.1 g/dL (6.3-8.2)  L  08/18/20  08:32    


 


Albumin  2.5 g/dL (3.9-5)  L  08/18/20  08:32    


 


Albumin/Globulin Ratio  0.7 %  08/18/20  08:32    


 


Procalcitonin  0.71 ng/mL (<0.15)   08/18/20  08:32    


 


Urine Color  Tiffany  (Yellow)   08/14/20  Unknown


 


Urine Turbidity  Slightly-cloudy  (Clear)   08/14/20  Unknown


 


Urine pH  5.0  (5.0-7.0)   08/14/20  Unknown


 


Ur Specific Gravity  1.026  (1.003-1.030)   08/14/20  Unknown


 


Urine Protein  100 mg/dl mg/dL (Negative)   08/14/20  Unknown


 


Urine Glucose (UA)  Neg mg/dL (Negative)   08/14/20  Unknown


 


Urine Ketones  Tr mg/dL (Negative)   08/14/20  Unknown


 


Urine Blood  Lg  (Negative)   08/14/20  Unknown


 


Urine Nitrite  Neg  (Negative)   08/14/20  Unknown


 


Urine Bilirubin  Neg  (Negative)   08/14/20  Unknown


 


Urine Urobilinogen  2.0 mg/dL (<2.0)   08/14/20  Unknown


 


Ur Leukocyte Esterase  Neg  (Negative)   08/14/20  Unknown


 


Urine WBC (Auto)  17.0 /HPF (0.0-6.0)  H  08/14/20  Unknown


 


Urine RBC (Auto)  1.0 /HPF (0.0-6.0)   08/14/20  Unknown


 


U Epithel Cells (Auto)  4.0 /HPF (0-13.0)   08/14/20  Unknown


 


Urine Mucus  2+ /HPF  08/14/20  Unknown


 


Salicylates  < 0.3 mg/dL (2.8-20.0)  L  08/13/20  22:44    


 


Urine Opiates Screen  Presumptive negative   08/14/20  Unknown


 


Urine Methadone Screen  Presumptive negative   08/14/20  Unknown


 


Acetaminophen  5.0 ug/mL (10.0-30.0)  L  08/13/20  22:44    


 


Ur Barbiturates Screen  Presumptive negative   08/14/20  Unknown


 


Valproic Acid  31.8 ug/mL ()  L  08/17/20  08:46    


 


Ur Phencyclidine Scrn  Presumptive negative   08/14/20  Unknown


 


Ur Amphetamines Screen  Presumptive negative   08/14/20  Unknown


 


U Benzodiazepines Scrn  Presumptive negative   08/14/20  Unknown


 


Urine Cocaine Screen  Presumptive negative   08/14/20  Unknown


 


U Marijuana (THC) Screen  Presumptive negative   08/14/20  Unknown


 


Drugs of Abuse Note  Disclamer   08/14/20  Unknown


 


Plasma/Serum Alcohol  < 0.01 % (0-0.07)   08/13/20  22:44    


 


Coronavirus (PCR)  Positive  (Negative)  A  08/15/20  Unknown











Schmidt/IV: 


                                        





Voiding Method                   External Female Catheter


IV Catheter Type [Right Upper    Peripheral IV


arm]                             


IV Catheter Type [Right          INT / Saline Lock


Antecubital]                     


IV Catheter Type [Right Foot]    INT / Saline Lock


IV Catheter Type [Right          Peripheral IV


External Jugular]                











Active Medications





- Current Medications


Current Medications: 














Generic Name Dose Route Start Last Admin





  Trade Name Freq  PRN Reason Stop Dose Admin


 


Acetaminophen  650 mg  08/14/20 08:00  08/21/20 12:10





  Tylenol  PO   650 mg





  Q4H PRN   Administration





  Pain MILD(1-3)/Fever >100.5/HA  


 


Amlodipine Besylate  10 mg  08/24/20 19:00  08/25/20 09:47





  Amlodipine  PO   10 mg





  QDAY KATHYA   Administration


 


Lipase/Protease/Amylase  1 each  08/17/20 10:34 





  Pancremarilin [place] 10,500 Unit  FEEDTUBE  





  PRN PRN  





  For Clogged Feeding Tube  


 


Benztropine Mesylate  1 mg  08/24/20 15:00  08/25/20 23:49





  Cogentin  PO   1 mg





  BID KATHYA   Administration


 


Clonidine HCl  0.2 mg  08/24/20 13:00  08/24/20 14:05





  Catapres-Tts Patch  TD   0.2 mg





  Mo KATHYA   Administration


 


Enoxaparin Sodium  40 mg  08/17/20 22:00  08/25/20 23:49





  Enoxaparin  SUB-Q   40 mg





  BID KATHYA   Administration


 


Hydralazine HCl  100 mg  08/24/20 14:00  08/25/20 23:49





  Apresoline  PO   100 mg





  TID KATHYA   Administration


 


Hydralazine HCl  5 mg  08/24/20 16:44 





  Apresoline  IV  





  Q30MIN PRN  





  Hypertension  


 


Dextrose/Sodium Chloride  1,000 mls @ 42 mls/hr  08/23/20 22:00  08/26/20 00:21





  D5ns  IV   75 mls/hr





  AS DIRECT KATHYA   Administration


 


Insulin Human Lispro  0 unit  08/25/20 22:00  08/25/20 23:57





  Humalog  SUB-Q   2 unit





  ACHS KATHYA   Administration





  Protocol  


 


Miscellaneous Medication  325 mg  08/15/20 18:45 





  Paliperidone Palmitate [Invega Sustenna]  IM  





  Q3W KATHYA  


 


Olanzapine  5 mg  08/25/20 22:00  08/26/20 00:03





  Zyprexa  PO   5 mg





  QHS KATHYA   Administration


 


Ondansetron HCl  4 mg  08/14/20 08:00 





  Zofran  IV  





  Q8H PRN  





  Nausea And Vomiting  


 


Prednisone  30 mg  08/25/20 10:00  08/25/20 09:48





  Deltasone  PO  08/27/20 23:59  30 mg





  QDAY KATHYA   Administration


 


Prednisone  20 mg  08/28/20 10:00 





  Deltasone  PO  08/30/20 23:59 





  QDAY KATHYA  


 


Prednisone  10 mg  08/31/20 10:00 





  Deltasone  PO  09/02/20 23:55 





  QDAY KATHYA  


 


Simple Syrup  15 ml  08/17/20 10:34 





  Simple Syrup  FEEDTUBE  





  PRN PRN  





  Hypoglycemia  


 


Simple Syrup  30 ml  08/17/20 10:34 





  Simple Syrup  FEEDTUBE  





  PRN PRN  





  Hypoglycemia  


 


Sodium Bicarbonate  325 mg  08/17/20 10:34 





  Sodium Bicarbonate  FEEDTUBE  





  PRN PRN  





  For Clogged Feeding Tube  


 


Sodium Chloride  10 ml  08/14/20 10:00  08/25/20 23:49





  Sodium Chloride Flush Syringe 10 Ml  IV   10 ml





  BID KATHYA   Administration


 


Sodium Chloride  10 ml  08/14/20 07:50 





  Sodium Chloride Flush Syringe 10 Ml  IV  08/27/20 07:49 





  PRN PRN  





  LINE FLUSH  


 


Trazodone HCl  50 mg  08/14/20 22:00  08/25/20 23:49





  Desyrel  PO   50 mg





  QHS KATHYA   Administration


 


Valproic Acid  1,500 mg  08/18/20 22:00  08/25/20 23:49





  Depakene Liq  PO   1,500 mg





  QHS KATHYA   Administration














Nutrition/Malnutrition Assess





- Dietary Evaluation


Nutrition/Malnutrition Findings: 


                                        





Nutrition Notes                                            Start:  08/17/20 0

9:33


Freq:                                                      Status: Active       




Protocol:                                                                       




 Document     08/24/20 10:47  Critical access hospital  (Rec: 08/24/20 10:52  Critical access hospital  [id number]

[email or web address])


 Nutrition Notes


     Initial or Follow up                        Reassessment


     Other Pertinent Diagnosis                   COVID-19 (+), Schizoaffective


                                                 disorder


     Current Diet                                Pureed


     Labs/Tests                                  Reviewed


     Pertinent Medications                       D5NS at 75ml/hr


     Height                                      5 ft 6 in


     Weight                                      114.5 kg


     Ideal Body Weight (kg)                      59.09


     BMI                                         40.7


     Subjective/Other Information                Pt pulled NG tube out


                                                 yesterday.  Per RN note, pt


                                                 tolerated applesauce and sips


                                                 of water last pm.  SLP


                                                 evaluated pt on 8/19 and


                                                 deemed pt unsafe for PO intake


                                                 at that time.  Pt may need


                                                 PEG tube if unable to consume


                                                 adequate PO to meet needs.


     Burn                                        Absent


     Trauma                                      Absent


     #1


      Nutrition Diagnosis                        Inadequate oral intake


      Diagnosis Progress(for reassessment        Continues


       documentation)                            


     Is patient on ventilator?                   No


     Is Patient Ambulatory and/or Out of Bed     No


     [facility]-([facility]-confined to bed)      2112.012


     Kcal/Kg value to use for calculation        14


     Approximate Energy Requirements Using       1603


      kcal/Kg                                    


     Calculation Used for Recommendations        Kcal/kg


     Additional Notes                            Pro needs 0.8-1g/kg adjBW: 69-


                                                 87g/day


                                                 Fluid needs 1ml/kcal


 Nutrition Intervention


     Change Diet Order:                          Continue current diet order as


                                                 tolerated; add consistent CHO


                                                 modifier


     Goal #1                                     PO tolerance


     Follow-Up By:                               08/26/20


     Additional Comments                         F/U: PO tolerance, SLP


                                                 evaluation

## 2020-08-26 LAB
HCT VFR BLD CALC: 38.3 % (ref 30.3–42.9)
HGB BLD-MCNC: 13.1 GM/DL (ref 10.1–14.3)
MCHC RBC AUTO-ENTMCNC: 34 % (ref 30–34)
MCV RBC AUTO: 88 FL (ref 79–97)
PLATELET # BLD: 144 K/MM3 (ref 140–440)
RBC # BLD AUTO: 4.36 M/MM3 (ref 3.65–5.03)

## 2020-08-26 RX ADMIN — ENOXAPARIN SODIUM SCH MG: 100 INJECTION SUBCUTANEOUS at 10:50

## 2020-08-26 RX ADMIN — INSULIN LISPRO SCH UNIT: 100 INJECTION, SOLUTION INTRAVENOUS; SUBCUTANEOUS at 22:23

## 2020-08-26 RX ADMIN — INSULIN LISPRO SCH UNIT: 100 INJECTION, SOLUTION INTRAVENOUS; SUBCUTANEOUS at 12:46

## 2020-08-26 RX ADMIN — Medication SCH ML: at 10:51

## 2020-08-26 RX ADMIN — HYDRALAZINE HYDROCHLORIDE SCH MG: 100 TABLET, FILM COATED ORAL at 13:38

## 2020-08-26 RX ADMIN — Medication SCH ML: at 22:22

## 2020-08-26 RX ADMIN — BENZTROPINE MESYLATE SCH MG: 1 TABLET ORAL at 22:21

## 2020-08-26 RX ADMIN — BENZTROPINE MESYLATE SCH MG: 1 TABLET ORAL at 11:07

## 2020-08-26 RX ADMIN — HYDRALAZINE HYDROCHLORIDE SCH MG: 100 TABLET, FILM COATED ORAL at 22:22

## 2020-08-26 RX ADMIN — ENOXAPARIN SODIUM SCH MG: 100 INJECTION SUBCUTANEOUS at 22:22

## 2020-08-26 RX ADMIN — DEXTROSE AND SODIUM CHLORIDE SCH MLS/HR: 5; .9 INJECTION, SOLUTION INTRAVENOUS at 00:21

## 2020-08-26 RX ADMIN — INSULIN LISPRO SCH UNIT: 100 INJECTION, SOLUTION INTRAVENOUS; SUBCUTANEOUS at 17:38

## 2020-08-26 RX ADMIN — HYDRALAZINE HYDROCHLORIDE SCH MG: 100 TABLET, FILM COATED ORAL at 10:56

## 2020-08-26 RX ADMIN — VALPROIC ACID SCH MG: 250 SOLUTION ORAL at 22:21

## 2020-08-26 RX ADMIN — INSULIN LISPRO SCH: 100 INJECTION, SOLUTION INTRAVENOUS; SUBCUTANEOUS at 10:50

## 2020-08-26 NOTE — PROGRESS NOTE
Assessment and Plan


Cultures:


Blood culture and urine culture negative


COVID PCR positive


 


Assessment: 55 years old female with history of paranoid schizophrenia, admitted

on 8/13/2020 due to altered mental status and catatonia state:





#Severe sepsis: Present on admission with low-grade fever, tachycardia, elevated

leukocytosis, WHITNEY, likely due to bilateral pneumonia. Resolved.





#Bilateral pneumonia: COVID-19 pneumonia.  Inflammatory markers are elevated, 

with a d-dimer > 10,000.  CT negative for pulmonary embolism. Repeat d-dimer 

with much improvement. Renal function and LFTs improving, but given mild hypoxia

and several days of positivity, unclear benefit with Remdesivir at this stage. 

Completed empiric CAP abx x 5 days. Completed steroids. 





#Acute hypoxemic respiratory failure: resolved, on room air.





#Elevated LFTs: from COVID/rhabdo





#WHITNEY: from COVID. improved





#Elevated CK: Likely secondary to rhabdomyolysis possible due to COVID-19 

infection or catatonic state





#Paranoid schizophrenia with catatonia: Per psych





Recommendations:


Overall, stable from ID standpoint








Blank Coppola MD, FACP


[facility] ([facility])


C: [phone]


O: [phone]


F: [phone]





Subjective


Date of service: 08/26/20


Principal diagnosis: Neurogenic Dysphagia


Interval history: 


No fever.  Remains on room air. 





Objective





- Exam


Narrative Exam: 


Physical Exam (reviewed in chart due to PPE conservation)


Constitutional: limited due to PPE conservation strategy


Head, Ears, Nose: limited due to PPE conservation strategy


Eyes: limited due to PPE conservation strategy


Neck: limited due to PPE conservation strategy


Oral: limited due to PPE conservation strategy


Cardiovascular: limited due to PPE conservation strategy


Respiratory: limited due to PPE conservation strategy


GI: limited due to PPE conservation strategy


Musculoskeletal: limited due to PPE conservation strategy


Skin: limited due to PPE conservation strategy


Hem/Lymphatic: limited due to PPE conservation strategy


Psych: limited due to PPE conservation strategy


Neurological: limited due to PPE conservation strategy        





- Constitutional


Vitals: 


                                   Vital Signs











Temp Pulse Resp BP Pulse Ox


 


 98.3 F   86   16   128/88   97 


 


 08/26/20 06:29  08/26/20 06:29  08/26/20 06:29  08/26/20 06:29  08/26/20 06:29








                           Temperature -Last 24 Hours











Temperature                    98.3 F


 


Temperature                    99.0 F


 


Temperature                    97.2 F


 


Temperature                    97.3 F

















- Labs


CBC & Chem 7: 


                                 08/26/20 09:08





                                 08/24/20 19:35


Labs: 


                              Abnormal lab results











  08/25/20 08/25/20 08/26/20 Range/Units





  16:33 22:02 07:52 


 


WBC     (4.5-11.0)  K/mm3


 


RDW     (13.2-15.2)  %


 


POC Glucose  217 H  170 H  126 H  ()  














  08/26/20 Range/Units





  09:08 


 


WBC  14.0 H  (4.5-11.0)  K/mm3


 


RDW  15.6 H  (13.2-15.2)  %


 


POC Glucose   ()

## 2020-08-26 NOTE — PROGRESS NOTE
<KWAKUPADMA LAURENMarco A - Last Filed: 08/26/20 13:13>





Assessment and Plan





- Patient Problems


(1) Severe sepsis


Current Visit: Yes   Status: Resolved   


Plan to address problem: 


- Presented with low-grade fever, tachycardia, leukocytosis, WHITNEY, and bilateral 

pneumonia


- Likely source is bilateral pneumonia secondary to COVID-19


- IV antibiotics completed


- Covid protocol initiated











(2) Pneumonia due to COVID-19 virus


Current Visit: Yes   Status: Acute   


Plan to address problem: 


- COVID protocol initiated


- IV azithromycin and Rocephin completed


- 8/14 COVID PCR positive


- Trend LDH, CRP, procalcitonin, d-dimer, ferritin 


- Droplet /contact precautions


- Supplemental oxygenation as needed


- Infectious disease consult requested, signed off at this time


- Pulmonary hygiene


- In setting of elevated D-Dimer, CTA Chest was ordered which was negative for 

pulmonary embolism


- Anticoagulation per COVID protocol


- Cannot initiate Remdesivir in setting of elevated LFTs


- IV solumedrel changed to PO prednisone and started on taper








(3) Paranoid schizophrenia


Current Visit: Yes   Status: Chronic   


Plan to address problem: 


-History of present schizophrenia


-8/24 Psych reconsulted


-Depakote and Haldol 


-Sleep hygiene


-Reorientation as needed








(4) Leukocytosis


Current Visit: Yes   Status: Acute   


Plan to address problem: 


- Admit WBC 13.8


- Trend CBC


- Maybe secondary to possible PNA or from dehydration


- IV abx completed


- Also on steroids for COVID pneumonia


- Now trending down








(5) Hypertension


Current Visit: Yes   Status: Chronic   


Plan to address problem: 


- BP monitoring per protocol


- PRN hydralizine


- Clonidine TD and hydralazine PO








(6) DVT prophylaxis


Current Visit: Yes   Status: Acute   


Plan to address problem: 


- SCDs to BLE while in bed


- Lovenox subq twice daily per COVID protocol








History


Interval history: 


This is a 55-year-old female with paranoid schizophrenia that presents to the 

emergency department on 8/13 for altered mental status in a catatonic state.  

Previous hospitalizations reviewed and only notes a history of paranoid 

schizophrenia. Patient may be a resident of [place].  Patient is currently 

nonverbal and HPI is received from ER documentation.  Work-up in the emergency 

department included a CT abdomen pelvis which shows patchy peripheral 

groundglass opacities bilateral lungs which is consistent with either viral or 

atypical pneumonia, positive anterior lateral fat hernia in the left mid abdomen

and a large uterine fibroids however her CXR showed no acute pulmonary or 

pleural abnormalities.  She was found to have acute kidney injury with a 

creatinine of 1.4/BUN 33 as baseline from previous records seems to be 0.7, 

rhabdomyolysis with a creatinine kinase of 33,481, leukocytosis with a WBC of 

13.8, thrombocytopenia with platelets at 97.  She has COVID pneumonia with IV 

antibiotics, rhabdomyolysis, and WHITNEY. Infectious disease, psych, and neurology 

have been consulted. Neurology does not suspect NMS at this time. Psych has 

signed off at this time due to ongoing acute medical processes.  PT/ST consulted

for evaluation but unable to be performed as patient will not follow verbal 

commands.  She will open her eyes to verbal stimuli and intermittently follow 

commands.  Patient remains nonverbal and only groans/moans to painful stimuli. 





* 8/25: Continue care, psych reconsulted but signed off with some medication 

  adjustments


* 8/24: pureed diet


* 8/21: continue care


* 8/20: GI consult for peg


* 8/19: continue care


* Discussed with primary caretaker patient was normally verbal but sometimes 

  goes into a catatonic state.  Phone number for this is [phone]


* 8/18: PT consulted.  Psych signed off.  Continue treatment


* 8/17: continue treatment


* 8/16: Change fluids to NS 1/2 AT 125CC/hr. Rhabdomylysis improving, insert NGT

  and start tube feeds. Psych input noted. Continue management for COVID 19. ID 

  consulted. No new fever.  CTA chest and CTH obtained which were both negative


* 8/15: COVID PCR Positive








Hospitalist Physical





- Constitutional


Vitals: 


                                        











Temp Pulse Resp BP Pulse Ox


 


 98.3 F   86   16   128/88   97 


 


 08/26/20 06:29  08/26/20 06:29  08/26/20 06:29  08/26/20 06:29  08/26/20 06:29











General appearance: Present: no acute distress, obese





- EENT


Eyes: Present: PERRL


ENT: hearing intact





- Neck


Neck: Present: normal ROM





- Respiratory


Respiratory effort: normal


Respiratory: bilateral: CTA





- Cardiovascular


Rhythm: regular


Heart Sounds: Present: S1 & S2.  Absent: systolic murmur, diastolic murmur





- Extremities


Extremities: no ischemia, pulses intact, pulses symmetrical, No edema, normal 

temperature, normal color, Full ROM


Peripheral Pulses: within normal limits





- Abdominal


General gastrointestinal: soft, non-tender, non-distended, normal bowel sounds





- Integumentary


Integumentary: Present: clear, warm, dry





- Psychiatric


Psychiatric: cooperative





- Neurologic


Neurologic: moves all extremities





- Allied Health


Allied health notes reviewed: nursing, PT, social work, case management





Results





- Labs


CBC & Chem 7: 


                                 08/26/20 09:08





                                 08/24/20 19:35


Labs: 


                             Laboratory Last Values











WBC  14.0 K/mm3 (4.5-11.0)  H  08/26/20  09:08    


 


RBC  4.36 M/mm3 (3.65-5.03)   08/26/20  09:08    


 


Hgb  13.1 gm/dl (10.1-14.3)   08/26/20  09:08    


 


Hct  38.3 % (30.3-42.9)   08/26/20  09:08    


 


MCV  88 fl (79-97)   08/26/20  09:08    


 


MCH  30 pg (28-32)   08/26/20  09:08    


 


MCHC  34 % (30-34)   08/26/20  09:08    


 


RDW  15.6 % (13.2-15.2)  H  08/26/20  09:08    


 


Plt Count  144 K/mm3 (140-440)   08/26/20  09:08    


 


Lymph % (Auto)  10.6 % (13.4-35.0)  L  08/13/20  22:44    


 


Mono % (Auto)  9.8 % (0.0-7.3)  H  08/13/20  22:44    


 


Eos % (Auto)  0.0 % (0.0-4.3)   08/13/20  22:44    


 


Baso % (Auto)  1.2 % (0.0-1.8)   08/13/20  22:44    


 


Lymph #  1.5 K/mm3 (1.2-5.4)   08/13/20  22:44    


 


Mono #  1.3 K/mm3 (0.0-0.8)  H  08/13/20  22:44    


 


Eos #  0.0 K/mm3 (0.0-0.4)   08/13/20  22:44    


 


Baso #  0.2 K/mm3 (0.0-0.1)  H  08/13/20  22:44    


 


Add Manual Diff  Complete   08/21/20  04:50    


 


Total Counted  100   08/21/20  04:50    


 


Seg Neutrophils %  78.4 % (40.0-70.0)  H  08/13/20  22:44    


 


Seg Neuts % (Manual)  87.0 % (40.0-70.0)  H  08/21/20  04:50    


 


Band Neutrophils %  4.0 %  08/21/20  04:50    


 


Lymphocytes % (Manual)  2.0 % (13.4-35.0)  L  08/21/20  04:50    


 


Reactive Lymphs % (Man)  0 %  08/21/20  04:50    


 


Monocytes % (Manual)  6.0 % (0.0-7.3)   08/21/20  04:50    


 


Eosinophils % (Manual)  0 % (0.0-4.3)   08/21/20  04:50    


 


Basophils % (Manual)  0 % (0.0-1.8)   08/21/20  04:50    


 


Metamyelocytes %  1.0 %  08/21/20  04:50    


 


Myelocytes %  0 %  08/21/20  04:50    


 


Promyelocytes %  0 %  08/21/20  04:50    


 


Blast Cells %  0 %  08/21/20  04:50    


 


Nucleated RBC %  1.0 % (0.0-0.9)  H  08/21/20  04:50    


 


Seg Neutrophils #  10.8 K/mm3 (1.8-7.7)  H  08/13/20  22:44    


 


Seg Neutrophils # Man  14.4 K/mm3 (1.8-7.7)  H  08/21/20  04:50    


 


Band Neutrophils #  0.7 K/mm3  08/21/20  04:50    


 


Lymphocytes # (Manual)  0.3 K/mm3 (1.2-5.4)  L  08/21/20  04:50    


 


Abs React Lymphs (Man)  0.0 K/mm3  08/21/20  04:50    


 


Monocytes # (Manual)  1.0 K/mm3 (0.0-0.8)  H  08/21/20  04:50    


 


Eosinophils # (Manual)  0.0 K/mm3 (0.0-0.4)   08/21/20  04:50    


 


Basophils # (Manual)  0.0 K/mm3 (0.0-0.1)   08/21/20  04:50    


 


Metamyelocytes #  0.2 K/mm3  08/21/20  04:50    


 


Myelocytes #  0.0 K/mm3  08/21/20  04:50    


 


Promyelocytes #  0.0 K/mm3  08/21/20  04:50    


 


Blast Cells #  0.0 K/mm3  08/21/20  04:50    


 


WBC Morphology  Not Reportable   08/21/20  04:50    


 


Hypersegmented Neuts  Not Reportable   08/21/20  04:50    


 


Hyposegmented Neuts  Not Reportable   08/21/20  04:50    


 


Hypogranular Neuts  Not Reportable   08/21/20  04:50    


 


Smudge Cells  Not Reportable   08/21/20  04:50    


 


Toxic Granulation  Not Reportable   08/21/20  04:50    


 


Toxic Vacuolation  Not Reportable   08/21/20  04:50    


 


Dohle Bodies  Not Reportable   08/21/20  04:50    


 


Pelger-Huet Anomaly  Not Reportable   08/21/20  04:50    


 


Arnie Rods  Not Reportable   08/21/20  04:50    


 


Platelet Estimate  Consistent w auto   08/21/20  04:50    


 


Clumped Platelets  Not Reportable   08/21/20  04:50    


 


Plt Clumps, EDTA  Not Reportable   08/21/20  04:50    


 


Large Platelets  Not Reportable   08/21/20  04:50    


 


Giant Platelets  Not Reportable   08/21/20  04:50    


 


Platelet Satelliting  Not Reportable   08/21/20  04:50    


 


Plt Morphology Comment  Not Reportable   08/21/20  04:50    


 


RBC Morphology  Not Reportable   08/21/20  04:50    


 


Dimorphic RBCs  Not Reportable   08/21/20  04:50    


 


Polychromasia  Not Reportable   08/21/20  04:50    


 


Hypochromasia  Not Reportable   08/21/20  04:50    


 


Poikilocytosis  Not Reportable   08/21/20  04:50    


 


Anisocytosis  Not Reportable   08/21/20  04:50    


 


Microcytosis  Not Reportable   08/21/20  04:50    


 


Macrocytosis  1+   08/21/20  04:50    


 


Spherocytes  Not Reportable   08/21/20  04:50    


 


Pappenheimer Bodies  Not Reportable   08/21/20  04:50    


 


Sickle Cells  Not Reportable   08/21/20  04:50    


 


Target Cells  1+   08/21/20  04:50    


 


Tear Drop Cells  Not Reportable   08/21/20  04:50    


 


Ovalocytes  Not Reportable   08/21/20  04:50    


 


Helmet Cells  Not Reportable   08/21/20  04:50    


 


Mancera-Ambridge Bodies  Not Reportable   08/21/20  04:50    


 


Cabot Rings  Not Reportable   08/21/20  04:50    


 


Jd Cells  Not Reportable   08/21/20  04:50    


 


Bite Cells  Not Reportable   08/21/20  04:50    


 


Crenated Cell  Not Reportable   08/21/20  04:50    


 


Elliptocytes  Not Reportable   08/21/20  04:50    


 


Acanthocytes (Spur)  Not Reportable   08/21/20  04:50    


 


Rouleaux  Not Reportable   08/21/20  04:50    


 


Hemoglobin C Crystals  Not Reportable   08/21/20  04:50    


 


Schistocytes  Not Reportable   08/21/20  04:50    


 


Malaria parasites  Not Reportable   08/21/20  04:50    


 


Dakota Bodies  Not Reportable   08/21/20  04:50    


 


Hem Pathologist Commnt  No   08/21/20  04:50    


 


PT  14.9 Sec. (12.2-14.9)   08/20/20  15:23    


 


INR  1.15  (0.87-1.13)  H  08/20/20  15:23    


 


APTT  29.5 Sec. (24.2-36.6)   08/20/20  15:23    


 


D-Dimer  390.46 ng/mlDDU (0-234)  H  08/22/20  09:32    


 


Sodium  136 mmol/L (137-145)  L D 08/24/20  19:35    


 


Potassium  4.5 mmol/L (3.6-5.0)   08/24/20  19:35    


 


Chloride  98.8 mmol/L ()   08/24/20  19:35    


 


Carbon Dioxide  25 mmol/L (22-30)   08/24/20  19:35    


 


Anion Gap  17 mmol/L  08/24/20  19:35    


 


BUN  12 mg/dL (7-17)   08/24/20  19:35    


 


Creatinine  0.6 mg/dL (0.6-1.2)   08/24/20  19:35    


 


Estimated GFR  > 60 ml/min  08/24/20  19:35    


 


BUN/Creatinine Ratio  20 %  08/24/20  19:35    


 


Glucose  278 mg/dL ()  H  08/24/20  19:35    


 


POC Glucose  203  ()  H  08/26/20  12:03    


 


Lactic Acid  1.30 mmol/L (0.7-2.0)   08/13/20  22:44    


 


Calcium  8.5 mg/dL (8.4-10.2)   08/24/20  19:35    


 


Ferritin  991.6 ng/mL (10.0-200.0)  H  08/22/20  09:32    


 


Total Bilirubin  0.40 mg/dL (0.1-1.2)   08/18/20  08:32    


 


Direct Bilirubin  0.3 mg/dL (0-0.2)  H  08/13/20  22:44    


 


Indirect Bilirubin  0.2 mg/dL  08/13/20  22:44    


 


AST  149 units/L (5-40)  H  08/18/20  08:32    


 


ALT  96 units/L (7-56)  H  08/18/20  08:32    


 


Alkaline Phosphatase  40 units/L ()   08/18/20  08:32    


 


Lactate Dehydrogenase  716 units/L ()  H  08/22/20  09:32    


 


Total Creatine Kinase  934 units/L ()  H  08/18/20  08:32    


 


C-Reactive Protein  0.80 mg/dL (0.00-1.30)   08/22/20  09:32    


 


Total Protein  6.1 g/dL (6.3-8.2)  L  08/18/20  08:32    


 


Albumin  2.5 g/dL (3.9-5)  L  08/18/20  08:32    


 


Albumin/Globulin Ratio  0.7 %  08/18/20  08:32    


 


Procalcitonin  0.71 ng/mL (<0.15)   08/18/20  08:32    


 


Urine Color  Tiffany  (Yellow)   08/14/20  Unknown


 


Urine Turbidity  Slightly-cloudy  (Clear)   08/14/20  Unknown


 


Urine pH  5.0  (5.0-7.0)   08/14/20  Unknown


 


Ur Specific Gravity  1.026  (1.003-1.030)   08/14/20  Unknown


 


Urine Protein  100 mg/dl mg/dL (Negative)   08/14/20  Unknown


 


Urine Glucose (UA)  Neg mg/dL (Negative)   08/14/20  Unknown


 


Urine Ketones  Tr mg/dL (Negative)   08/14/20  Unknown


 


Urine Blood  Lg  (Negative)   08/14/20  Unknown


 


Urine Nitrite  Neg  (Negative)   08/14/20  Unknown


 


Urine Bilirubin  Neg  (Negative)   08/14/20  Unknown


 


Urine Urobilinogen  2.0 mg/dL (<2.0)   08/14/20  Unknown


 


Ur Leukocyte Esterase  Neg  (Negative)   08/14/20  Unknown


 


Urine WBC (Auto)  17.0 /HPF (0.0-6.0)  H  08/14/20  Unknown


 


Urine RBC (Auto)  1.0 /HPF (0.0-6.0)   08/14/20  Unknown


 


U Epithel Cells (Auto)  4.0 /HPF (0-13.0)   08/14/20  Unknown


 


Urine Mucus  2+ /HPF  08/14/20  Unknown


 


Salicylates  < 0.3 mg/dL (2.8-20.0)  L  08/13/20  22:44    


 


Urine Opiates Screen  Presumptive negative   08/14/20  Unknown


 


Urine Methadone Screen  Presumptive negative   08/14/20  Unknown


 


Acetaminophen  5.0 ug/mL (10.0-30.0)  L  08/13/20  22:44    


 


Ur Barbiturates Screen  Presumptive negative   08/14/20  Unknown


 


Valproic Acid  31.8 ug/mL ()  L  08/17/20  08:46    


 


Ur Phencyclidine Scrn  Presumptive negative   08/14/20  Unknown


 


Ur Amphetamines Screen  Presumptive negative   08/14/20  Unknown


 


U Benzodiazepines Scrn  Presumptive negative   08/14/20  Unknown


 


Urine Cocaine Screen  Presumptive negative   08/14/20  Unknown


 


U Marijuana (THC) Screen  Presumptive negative   08/14/20  Unknown


 


Drugs of Abuse Note  Disclamer   08/14/20  Unknown


 


Plasma/Serum Alcohol  < 0.01 % (0-0.07)   08/13/20  22:44    


 


Coronavirus (PCR)  Positive  (Negative)  A  08/15/20  Unknown











Schmidt/IV: 


                                        





Voiding Method                   External Female Catheter


IV Catheter Type [Right Upper    Peripheral IV


arm]                             


IV Catheter Type [Right          INT / Saline Lock


Antecubital]                     


IV Catheter Type [Right Foot]    INT / Saline Lock


IV Catheter Type [Right          Peripheral IV


External Jugular]                











Active Medications





- Current Medications


Current Medications: 














Generic Name Dose Route Start Last Admin





  Trade Name Freq  PRN Reason Stop Dose Admin


 


Acetaminophen  650 mg  08/14/20 08:00  08/21/20 12:10





  Tylenol  PO   650 mg





  Q4H PRN   Administration





  Pain MILD(1-3)/Fever >100.5/HA  


 


Amlodipine Besylate  10 mg  08/24/20 19:00  08/26/20 10:50





  Amlodipine  PO   10 mg





  QDAY KATHYA   Administration


 


Lipase/Protease/Amylase  1 each  08/17/20 10:34 





  Pancremarilin Clay 10,500 Unit  FEEDTUBE  





  PRN PRN  





  For Clogged Feeding Tube  


 


Benztropine Mesylate  1 mg  08/24/20 15:00  08/26/20 11:07





  Cogentin  PO   1 mg





  BID KATHYA   Administration


 


Clonidine HCl  0.2 mg  08/24/20 13:00  08/24/20 14:05





  Catapres-Tts Patch  TD   0.2 mg





  Mo KATHYA   Administration


 


Enoxaparin Sodium  40 mg  08/17/20 22:00  08/26/20 10:50





  Enoxaparin  SUB-Q   40 mg





  BID KATHYA   Administration


 


Hydralazine HCl  100 mg  08/24/20 14:00  08/26/20 10:56





  Apresoline  PO   100 mg





  TID KATHYA   Administration


 


Hydralazine HCl  5 mg  08/24/20 16:44 





  Apresoline  IV  





  Q30MIN PRN  





  Hypertension  


 


Dextrose/Sodium Chloride  1,000 mls @ 42 mls/hr  08/23/20 22:00  08/26/20 00:21





  D5ns  IV   75 mls/hr





  AS DIRECT KATHYA   Administration


 


Insulin Human Lispro  0 unit  08/25/20 22:00  08/26/20 12:46





  Humalog  SUB-Q   3 unit





  ACHS KATHYA   Administration





  Protocol  


 


Miscellaneous Medication  325 mg  08/15/20 18:45 





  Paliperidone Palmitate [Invega Sustenna]  IM  





  Q3W KATHYA  


 


Olanzapine  5 mg  08/25/20 22:00  08/26/20 00:03





  Zyprexa  PO   5 mg





  QHS KATHYA   Administration


 


Ondansetron HCl  4 mg  08/14/20 08:00 





  Zofran  IV  





  Q8H PRN  





  Nausea And Vomiting  


 


Prednisone  30 mg  08/25/20 10:00  08/26/20 10:50





  Deltasone  PO  08/27/20 23:59  30 mg





  QDAY KATHYA   Administration


 


Prednisone  20 mg  08/28/20 10:00 





  Deltasone  PO  08/30/20 23:59 





  QDAY KATHYA  


 


Prednisone  10 mg  08/31/20 10:00 





  Deltasone  PO  09/02/20 23:55 





  QDAY KATHYA  


 


Simple Syrup  15 ml  08/17/20 10:34 





  Simple Syrup  FEEDTUBE  





  PRN PRN  





  Hypoglycemia  


 


Simple Syrup  30 ml  08/17/20 10:34 





  Simple Syrup  FEEDTUBE  





  PRN PRN  





  Hypoglycemia  


 


Sodium Bicarbonate  325 mg  08/17/20 10:34 





  Sodium Bicarbonate  FEEDTUBE  





  PRN PRN  





  For Clogged Feeding Tube  


 


Sodium Chloride  10 ml  08/14/20 10:00  08/26/20 10:51





  Sodium Chloride Flush Syringe 10 Ml  IV   10 ml





  BID KATHYA   Administration


 


Sodium Chloride  10 ml  08/14/20 07:50 





  Sodium Chloride Flush Syringe 10 Ml  IV  08/27/20 07:49 





  PRN PRN  





  LINE FLUSH  


 


Trazodone HCl  50 mg  08/14/20 22:00  08/25/20 23:49





  Desyrel  PO   50 mg





  QHS KATHYA   Administration


 


Valproic Acid  1,500 mg  08/18/20 22:00  08/25/20 23:49





  Depakene Liq  PO   1,500 mg





  QHS KATHYA   Administration














Nutrition/Malnutrition Assess





- Dietary Evaluation


Nutrition/Malnutrition Findings: 


                                        





Nutrition Notes                                            Start:  08/17/20 

09:33


Freq:                                                      Status: Active       




Protocol:                                                                       




 Document     08/24/20 10:47  NHMICHAEL  (Rec: 08/24/20 10:52  [facility]  SRW-

[email or web address])


 Nutrition Notes


     Initial or Follow up                        Reassessment


     Other Pertinent Diagnosis                   COVID-19 (+), Schizoaffective


                                                 disorder


     Current Diet                                Pureed


     Labs/Tests                                  Reviewed


     Pertinent Medications                       D5NS at 75ml/hr


     Height                                      5 ft 6 in


     Weight                                      114.5 kg


     Ideal Body Weight (kg)                      59.09


     BMI                                         40.7


     Subjective/Other Information                Pt pulled NG tube out


                                                 yesterday.  Per RN note, pt


                                                 tolerated applesauce and sips


                                                 of water last pm.  SLP


                                                 evaluated pt on 8/19 and


                                                 deemed pt unsafe for PO intake


                                                 at that time.  Pt may need


                                                 PEG tube if unable to consume


                                                 adequate PO to meet needs.


     Burn                                        Absent


     Trauma                                      Absent


     #1


      Nutrition Diagnosis                        Inadequate oral intake


      Diagnosis Progress(for reassessment        Continues


       documentation)                            


     Is patient on ventilator?                   No


     Is Patient Ambulatory and/or Out of Bed     No


     REE-([facility]-confined to bed)      2112.012


     Kcal/Kg value to use for calculation        14


     Approximate Energy Requirements Using       1603


      kcal/Kg                                    


     Calculation Used for Recommendations        Kcal/kg


     Additional Notes                            Pro needs 0.8-1g/kg adjBW: 69-


                                                 87g/day


                                                 Fluid needs 1ml/kcal


 Nutrition Intervention


     Change Diet Order:                          Continue current diet order as


                                                 tolerated; add consistent CHO


                                                 modifier


     Goal #1                                     PO tolerance


     Follow-Up By:                               08/26/20


     Additional Comments                         F/U: PO tolerance, SLP


                                                 evaluation











<CASA ULRICH - Last Filed: 08/26/20 15:27>





Assessment and Plan


Assessment and plan: 





I saw and evaluated the patient. I agree with the findings and the plan of care 

as documented in the Nurse Practitioner's~note, with the following corrections 

and additions.








Hospitalist Physical





- Constitutional


Vitals: 


                                        











Temp Pulse Resp BP Pulse Ox


 


 98.5 F   89   18   114/63   99 


 


 08/26/20 13:10  08/26/20 13:10  08/26/20 13:10  08/26/20 13:10  08/26/20 13:10














Results





- Labs


CBC & Chem 7: 


                                 08/26/20 09:08





                                 08/24/20 19:35


Labs: 


                             Laboratory Last Values











WBC  14.0 K/mm3 (4.5-11.0)  H  08/26/20  09:08    


 


RBC  4.36 M/mm3 (3.65-5.03)   08/26/20  09:08    


 


Hgb  13.1 gm/dl (10.1-14.3)   08/26/20  09:08    


 


Hct  38.3 % (30.3-42.9)   08/26/20  09:08    


 


MCV  88 fl (79-97)   08/26/20  09:08    


 


MCH  30 pg (28-32)   08/26/20  09:08    


 


MCHC  34 % (30-34)   08/26/20  09:08    


 


RDW  15.6 % (13.2-15.2)  H  08/26/20  09:08    


 


Plt Count  144 K/mm3 (140-440)   08/26/20  09:08    


 


Lymph % (Auto)  10.6 % (13.4-35.0)  L  08/13/20  22:44    


 


Mono % (Auto)  9.8 % (0.0-7.3)  H  08/13/20  22:44    


 


Eos % (Auto)  0.0 % (0.0-4.3)   08/13/20  22:44    


 


Baso % (Auto)  1.2 % (0.0-1.8)   08/13/20  22:44    


 


Lymph #  1.5 K/mm3 (1.2-5.4)   08/13/20  22:44    


 


Mono #  1.3 K/mm3 (0.0-0.8)  H  08/13/20  22:44    


 


Eos #  0.0 K/mm3 (0.0-0.4)   08/13/20  22:44    


 


Baso #  0.2 K/mm3 (0.0-0.1)  H  08/13/20  22:44    


 


Add Manual Diff  Complete   08/21/20  04:50    


 


Total Counted  100   08/21/20  04:50    


 


Seg Neutrophils %  78.4 % (40.0-70.0)  H  08/13/20  22:44    


 


Seg Neuts % (Manual)  87.0 % (40.0-70.0)  H  08/21/20  04:50    


 


Band Neutrophils %  4.0 %  08/21/20  04:50    


 


Lymphocytes % (Manual)  2.0 % (13.4-35.0)  L  08/21/20  04:50    


 


Reactive Lymphs % (Man)  0 %  08/21/20  04:50    


 


Monocytes % (Manual)  6.0 % (0.0-7.3)   08/21/20  04:50    


 


Eosinophils % (Manual)  0 % (0.0-4.3)   08/21/20  04:50    


 


Basophils % (Manual)  0 % (0.0-1.8)   08/21/20  04:50    


 


Metamyelocytes %  1.0 %  08/21/20  04:50    


 


Myelocytes %  0 %  08/21/20  04:50    


 


Promyelocytes %  0 %  08/21/20  04:50    


 


Blast Cells %  0 %  08/21/20  04:50    


 


Nucleated RBC %  1.0 % (0.0-0.9)  H  08/21/20  04:50    


 


Seg Neutrophils #  10.8 K/mm3 (1.8-7.7)  H  08/13/20  22:44    


 


Seg Neutrophils # Man  14.4 K/mm3 (1.8-7.7)  H  08/21/20  04:50    


 


Band Neutrophils #  0.7 K/mm3  08/21/20  04:50    


 


Lymphocytes # (Manual)  0.3 K/mm3 (1.2-5.4)  L  08/21/20  04:50    


 


Abs React Lymphs (Man)  0.0 K/mm3  08/21/20  04:50    


 


Monocytes # (Manual)  1.0 K/mm3 (0.0-0.8)  H  08/21/20  04:50    


 


Eosinophils # (Manual)  0.0 K/mm3 (0.0-0.4)   08/21/20  04:50    


 


Basophils # (Manual)  0.0 K/mm3 (0.0-0.1)   08/21/20  04:50    


 


Metamyelocytes #  0.2 K/mm3  08/21/20  04:50    


 


Myelocytes #  0.0 K/mm3  08/21/20  04:50    


 


Promyelocytes #  0.0 K/mm3  08/21/20  04:50    


 


Blast Cells #  0.0 K/mm3  08/21/20  04:50    


 


WBC Morphology  Not Reportable   08/21/20  04:50    


 


Hypersegmented Neuts  Not Reportable   08/21/20  04:50    


 


Hyposegmented Neuts  Not Reportable   08/21/20  04:50    


 


Hypogranular Neuts  Not Reportable   08/21/20  04:50    


 


Smudge Cells  Not Reportable   08/21/20  04:50    


 


Toxic Granulation  Not Reportable   08/21/20  04:50    


 


Toxic Vacuolation  Not Reportable   08/21/20  04:50    


 


Dohle Bodies  Not Reportable   08/21/20  04:50    


 


Pelger-Huet Anomaly  Not Reportable   08/21/20  04:50    


 


Arnie Rods  Not Reportable   08/21/20  04:50    


 


Platelet Estimate  Consistent w auto   08/21/20  04:50    


 


Clumped Platelets  Not Reportable   08/21/20  04:50    


 


Plt Clumps, EDTA  Not Reportable   08/21/20  04:50    


 


Large Platelets  Not Reportable   08/21/20  04:50    


 


Giant Platelets  Not Reportable   08/21/20  04:50    


 


Platelet Satelliting  Not Reportable   08/21/20  04:50    


 


Plt Morphology Comment  Not Reportable   08/21/20  04:50    


 


RBC Morphology  Not Reportable   08/21/20  04:50    


 


Dimorphic RBCs  Not Reportable   08/21/20  04:50    


 


Polychromasia  Not Reportable   08/21/20  04:50    


 


Hypochromasia  Not Reportable   08/21/20  04:50    


 


Poikilocytosis  Not Reportable   08/21/20  04:50    


 


Anisocytosis  Not Reportable   08/21/20  04:50    


 


Microcytosis  Not Reportable   08/21/20  04:50    


 


Macrocytosis  1+   08/21/20  04:50    


 


Spherocytes  Not Reportable   08/21/20  04:50    


 


Pappenheimer Bodies  Not Reportable   08/21/20  04:50    


 


Sickle Cells  Not Reportable   08/21/20  04:50    


 


Target Cells  1+   08/21/20  04:50    


 


Tear Drop Cells  Not Reportable   08/21/20  04:50    


 


Ovalocytes  Not Reportable   08/21/20  04:50    


 


Helmet Cells  Not Reportable   08/21/20  04:50    


 


Mancera-Ambridge Bodies  Not Reportable   08/21/20  04:50    


 


Cabot Rings  Not Reportable   08/21/20  04:50    


 


Jd Cells  Not Reportable   08/21/20  04:50    


 


Bite Cells  Not Reportable   08/21/20  04:50    


 


Crenated Cell  Not Reportable   08/21/20  04:50    


 


Elliptocytes  Not Reportable   08/21/20  04:50    


 


Acanthocytes (Spur)  Not Reportable   08/21/20  04:50    


 


Rouleaux  Not Reportable   08/21/20  04:50    


 


Hemoglobin C Crystals  Not Reportable   08/21/20  04:50    


 


Schistocytes  Not Reportable   08/21/20  04:50    


 


Malaria parasites  Not Reportable   08/21/20  04:50    


 


D[place]ota Bodies  Not Reportable   08/21/20  04:50    


 


Hem Pathologist Commnt  No   08/21/20  04:50    


 


PT  14.9 Sec. (12.2-14.9)   08/20/20  15:23    


 


INR  1.15  (0.87-1.13)  H  08/20/20  15:23    


 


APTT  29.5 Sec. (24.2-36.6)   08/20/20  15:23    


 


D-Dimer  390.46 ng/mlDDU (0-234)  H  08/22/20  09:32    


 


Sodium  136 mmol/L (137-145)  L D 08/24/20  19:35    


 


Potassium  4.5 mmol/L (3.6-5.0)   08/24/20  19:35    


 


Chloride  98.8 mmol/L ()   08/24/20  19:35    


 


Carbon Dioxide  25 mmol/L (22-30)   08/24/20  19:35    


 


Anion Gap  17 mmol/L  08/24/20  19:35    


 


BUN  12 mg/dL (7-17)   08/24/20  19:35    


 


Creatinine  0.6 mg/dL (0.6-1.2)   08/24/20  19:35    


 


Estimated GFR  > 60 ml/min  08/24/20  19:35    


 


BUN/Creatinine Ratio  20 %  08/24/20  19:35    


 


Glucose  278 mg/dL ()  H  08/24/20  19:35    


 


POC Glucose  203  ()  H  08/26/20  12:03    


 


Lactic Acid  1.30 mmol/L (0.7-2.0)   08/13/20  22:44    


 


Calcium  8.5 mg/dL (8.4-10.2)   08/24/20  19:35    


 


Ferritin  991.6 ng/mL (10.0-200.0)  H  08/22/20  09:32    


 


Total Bilirubin  0.40 mg/dL (0.1-1.2)   08/18/20  08:32    


 


Direct Bilirubin  0.3 mg/dL (0-0.2)  H  08/13/20  22:44    


 


Indirect Bilirubin  0.2 mg/dL  08/13/20  22:44    


 


AST  149 units/L (5-40)  H  08/18/20  08:32    


 


ALT  96 units/L (7-56)  H  08/18/20  08:32    


 


Alkaline Phosphatase  40 units/L ()   08/18/20  08:32    


 


Lactate Dehydrogenase  716 units/L ()  H  08/22/20  09:32    


 


Total Creatine Kinase  934 units/L ()  H  08/18/20  08:32    


 


C-Reactive Protein  0.80 mg/dL (0.00-1.30)   08/22/20  09:32    


 


Total Protein  6.1 g/dL (6.3-8.2)  L  08/18/20  08:32    


 


Albumin  2.5 g/dL (3.9-5)  L  08/18/20  08:32    


 


Albumin/Globulin Ratio  0.7 %  08/18/20  08:32    


 


Procalcitonin  0.71 ng/mL (<0.15)   08/18/20  08:32    


 


Urine Color  Tiffany  (Yellow)   08/14/20  Unknown


 


Urine Turbidity  Slightly-cloudy  (Clear)   08/14/20  Unknown


 


Urine pH  5.0  (5.0-7.0)   08/14/20  Unknown


 


Ur Specific Gravity  1.026  (1.003-1.030)   08/14/20  Unknown


 


Urine Protein  100 mg/dl mg/dL (Negative)   08/14/20  Unknown


 


Urine Glucose (UA)  Neg mg/dL (Negative)   08/14/20  Unknown


 


Urine Ketones  Tr mg/dL (Negative)   08/14/20  Unknown


 


Urine Blood  Lg  (Negative)   08/14/20  Unknown


 


Urine Nitrite  Neg  (Negative)   08/14/20  Unknown


 


Urine Bilirubin  Neg  (Negative)   08/14/20  Unknown


 


Urine Urobilinogen  2.0 mg/dL (<2.0)   08/14/20  Unknown


 


Ur Leukocyte Esterase  Neg  (Negative)   08/14/20  Unknown


 


Urine WBC (Auto)  17.0 /HPF (0.0-6.0)  H  08/14/20  Unknown


 


Urine RBC (Auto)  1.0 /HPF (0.0-6.0)   08/14/20  Unknown


 


U Epithel Cells (Auto)  4.0 /HPF (0-13.0)   08/14/20  Unknown


 


Urine Mucus  2+ /HPF  08/14/20  Unknown


 


Salicylates  < 0.3 mg/dL (2.8-20.0)  L  08/13/20  22:44    


 


Urine Opiates Screen  Presumptive negative   08/14/20  Unknown


 


Urine Methadone Screen  Presumptive negative   08/14/20  Unknown


 


Acetaminophen  5.0 ug/mL (10.0-30.0)  L  08/13/20  22:44    


 


Ur Barbiturates Screen  Presumptive negative   08/14/20  Unknown


 


Valproic Acid  31.8 ug/mL ()  L  08/17/20  08:46    


 


Ur Phencyclidine Scrn  Presumptive negative   08/14/20  Unknown


 


Ur Amphetamines Screen  Presumptive negative   08/14/20  Unknown


 


U Benzodiazepines Scrn  Presumptive negative   08/14/20  Unknown


 


Urine Cocaine Screen  Presumptive negative   08/14/20  Unknown


 


U Marijuana (THC) Screen  Presumptive negative   08/14/20  Unknown


 


Drugs of Abuse Note  Disclamer   08/14/20  Unknown


 


Plasma/Serum Alcohol  < 0.01 % (0-0.07)   08/13/20  22:44    


 


Coronavirus (PCR)  Positive  (Negative)  A  08/15/20  Unknown











Schmidt/IV: 


                                        





Voiding Method                   External Female Catheter


IV Catheter Type [Right Upper    Peripheral IV


arm]                             


IV Catheter Type [Right          INT / Saline Lock


Antecubital]                     


IV Catheter Type [Right Foot]    INT / Saline Lock


IV Catheter Type [Right          Peripheral IV


External Jugular]                











Active Medications





- Current Medications


Current Medications: 














Generic Name Dose Route Start Last Admin





  Trade Name Freq  PRN Reason Stop Dose Admin


 


Acetaminophen  650 mg  08/14/20 08:00  08/21/20 12:10





  Tylenol  PO   650 mg





  Q4H PRN   Administration





  Pain MILD(1-3)/Fever >100.5/HA  


 


Amlodipine Besylate  10 mg  08/24/20 19:00  08/26/20 10:50





  Amlodipine  PO   10 mg





  QDAY KATHYA   Administration


 


Lipase/Protease/Amylase  1 each  08/17/20 10:34 





  Pancreaze Dr 10,500 Unit  FEEDTUBE  





  PRN PRN  





  For Clogged Feeding Tube  


 


Benztropine Mesylate  1 mg  08/24/20 15:00  08/26/20 11:07





  Cogentin  PO   1 mg





  BID KATHYA   Administration


 


Clonidine HCl  0.2 mg  08/24/20 13:00  08/24/20 14:05





  Catapres-Tts Patch  TD   0.2 mg





  Mo KATHYA   Administration


 


Enoxaparin Sodium  40 mg  08/17/20 22:00  08/26/20 10:50





  Enoxaparin  SUB-Q   40 mg





  BID KATHYA   Administration


 


Hydralazine HCl  100 mg  08/24/20 14:00  08/26/20 13:38





  Apresoline  PO   100 mg





  TID KATHYA   Administration


 


Hydralazine HCl  5 mg  08/24/20 16:44 





  Apresoline  IV  





  Q30MIN PRN  





  Hypertension  


 


Dextrose/Sodium Chloride  1,000 mls @ 42 mls/hr  08/23/20 22:00  08/26/20 00:21





  D5ns  IV   75 mls/hr





  AS DIRECT KATHYA   Administration


 


Insulin Human Lispro  0 unit  08/25/20 22:00  08/26/20 12:46





  Humalog  SUB-Q   3 unit





  ACHS KATHYA   Administration





  Protocol  


 


Miscellaneous Medication  325 mg  08/15/20 18:45 





  Paliperidone Palmitate [Invega Sustenna]  IM  





  Q3W KATHYA  


 


Olanzapine  5 mg  08/25/20 22:00  08/26/20 00:03





  Zyprexa  PO   5 mg





  QHS KATHYA   Administration


 


Ondansetron HCl  4 mg  08/14/20 08:00 





  Zofran  IV  





  Q8H PRN  





  Nausea And Vomiting  


 


Prednisone  30 mg  08/25/20 10:00  08/26/20 10:50





  Deltasone  PO  08/27/20 23:59  30 mg





  QDAY KATHYA   Administration


 


Prednisone  20 mg  08/28/20 10:00 





  Deltasone  PO  08/30/20 23:59 





  QDAY KATHYA  


 


Prednisone  10 mg  08/31/20 10:00 





  Deltasone  PO  09/02/20 23:55 





  QDAY KATHYA  


 


Simple Syrup  15 ml  08/17/20 10:34 





  Simple Syrup  FEEDTUBE  





  PRN PRN  





  Hypoglycemia  


 


Simple Syrup  30 ml  08/17/20 10:34 





  Simple Syrup  FEEDTUBE  





  PRN PRN  





  Hypoglycemia  


 


Sodium Bicarbonate  325 mg  08/17/20 10:34 





  Sodium Bicarbonate  FEEDTUBE  





  PRN PRN  





  For Clogged Feeding Tube  


 


Sodium Chloride  10 ml  08/14/20 10:00  08/26/20 10:51





  Sodium Chloride Flush Syringe 10 Ml  IV   10 ml





  BID KATHYA   Administration


 


Sodium Chloride  10 ml  08/14/20 07:50 





  Sodium Chloride Flush Syringe 10 Ml  IV  08/27/20 07:49 





  PRN PRN  





  LINE FLUSH  


 


Trazodone HCl  50 mg  08/14/20 22:00  08/25/20 23:49





  Desyrel  PO   50 mg





  QHS KATHYA   Administration


 


Valproic Acid  1,500 mg  08/18/20 22:00  08/25/20 23:49





  Depakene Liq  PO   1,500 mg





  QHS KATHYA   Administration














Nutrition/Malnutrition Assess





- Dietary Evaluation


Nutrition/Malnutrition Findings: 


                                        





Nutrition Notes                                            Start:  08/17/20 

09:33


Freq:                                                      Status: Active       




Protocol:                                                                       




 Document     08/24/20 10:47  [facility]  (Rec: 08/24/20 10:52  [facility]  [id number]

S1)


 Nutrition Notes


     Initial or Follow up                        Reassessment


     Other Pertinent Diagnosis                   COVID-19 (+), Schizoaffective


                                                 disorder


     Current Diet                                Pureed


     Labs/Tests                                  Reviewed


     Pertinent Medications                       D5NS at 75ml/hr


     Height                                      5 ft 6 in


     Weight                                      114.5 kg


     Ideal Body Weight (kg)                      59.09


     BMI                                         40.7


     Subjective/Other Information                Pt pulled NG tube out


                                                 yesterday.  Per RN note, pt


                                                 tolerated applesauce and sips


                                                 of water last pm.  SLP


                                                 evaluated pt on 8/19 and


                                                 deemed pt unsafe for PO intake


                                                 at that time.  Pt may need


                                                 PEG tube if unable to consume


                                                 adequate PO to meet needs.


     Burn                                        Absent


     Trauma                                      Absent


     #1


      Nutrition Diagnosis                        Inadequate oral intake


      Diagnosis Progress(for reassessment        Continues


       documentation)                            


     Is patient on ventilator?                   No


     Is Patient Ambulatory and/or Out of Bed     No


     REE-([facility]-confined to bed)      2112.012


     Kcal/Kg value to use for calculation        14


     Approximate Energy Requirements Using       1603


      kcal/Kg                                    


     Calculation Used for Recommendations        Kcal/kg


     Additional Notes                            Pro needs 0.8-1g/kg adjBW: 69-


                                                 87g/day


                                                 Fluid needs 1ml/kcal


 Nutrition Intervention


     Change Diet Order:                          Continue current diet order as


                                                 tolerated; add consistent CHO


                                                 modifier


     Goal #1                                     PO tolerance


     Follow-Up By:                               08/26/20


     Additional Comments                         F/U: PO tolerance, SLP


                                                 evaluation

## 2020-08-27 LAB
BUN SERPL-MCNC: 20 MG/DL (ref 7–17)
BUN/CREAT SERPL: 25 %
CALCIUM SERPL-MCNC: 8.4 MG/DL (ref 8.4–10.2)
HCT VFR BLD CALC: 32.6 % (ref 30.3–42.9)
HEMOLYSIS INDEX: 2
HGB BLD-MCNC: 10.9 GM/DL (ref 10.1–14.3)
MCHC RBC AUTO-ENTMCNC: 34 % (ref 30–34)
MCV RBC AUTO: 89 FL (ref 79–97)
PLATELET # BLD: 117 K/MM3 (ref 140–440)
RBC # BLD AUTO: 3.67 M/MM3 (ref 3.65–5.03)

## 2020-08-27 RX ADMIN — INSULIN LISPRO SCH UNIT: 100 INJECTION, SOLUTION INTRAVENOUS; SUBCUTANEOUS at 13:21

## 2020-08-27 RX ADMIN — Medication SCH ML: at 22:57

## 2020-08-27 RX ADMIN — INSULIN LISPRO SCH: 100 INJECTION, SOLUTION INTRAVENOUS; SUBCUTANEOUS at 23:12

## 2020-08-27 RX ADMIN — HYDRALAZINE HYDROCHLORIDE SCH MG: 100 TABLET, FILM COATED ORAL at 14:42

## 2020-08-27 RX ADMIN — BENZTROPINE MESYLATE SCH MG: 1 TABLET ORAL at 09:53

## 2020-08-27 RX ADMIN — DEXTROSE AND SODIUM CHLORIDE SCH MLS/HR: 5; .9 INJECTION, SOLUTION INTRAVENOUS at 09:52

## 2020-08-27 RX ADMIN — Medication SCH ML: at 09:53

## 2020-08-27 RX ADMIN — INSULIN LISPRO SCH: 100 INJECTION, SOLUTION INTRAVENOUS; SUBCUTANEOUS at 09:03

## 2020-08-27 RX ADMIN — ENOXAPARIN SODIUM SCH MG: 100 INJECTION SUBCUTANEOUS at 22:56

## 2020-08-27 RX ADMIN — HYDRALAZINE HYDROCHLORIDE SCH: 100 TABLET, FILM COATED ORAL at 09:53

## 2020-08-27 RX ADMIN — INSULIN LISPRO SCH UNIT: 100 INJECTION, SOLUTION INTRAVENOUS; SUBCUTANEOUS at 17:47

## 2020-08-27 RX ADMIN — VALPROIC ACID SCH MG: 250 SOLUTION ORAL at 22:56

## 2020-08-27 RX ADMIN — ENOXAPARIN SODIUM SCH MG: 100 INJECTION SUBCUTANEOUS at 09:53

## 2020-08-27 RX ADMIN — BENZTROPINE MESYLATE SCH MG: 1 TABLET ORAL at 22:57

## 2020-08-27 NOTE — PROGRESS NOTE
<KWAKUPADMA LAURENMarco A - Last Filed: 08/27/20 15:58>





Assessment and Plan





- Patient Problems


(1) Severe sepsis


Current Visit: Yes   Status: Resolved   


Plan to address problem: 


- Presented with low-grade fever, tachycardia, leukocytosis, WHITNEY, and bilateral 

pneumonia


- Likely source is bilateral pneumonia secondary to COVID-19


- IV antibiotics completed


- Covid protocol initiated











(2) Pneumonia due to COVID-19 virus


Current Visit: Yes   Status: Acute   


Plan to address problem: 


- COVID protocol initiated


- IV azithromycin and Rocephin completed


- 8/14 COVID PCR positive


- Trend LDH, CRP, procalcitonin, d-dimer, ferritin 


- Droplet /contact precautions


- Supplemental oxygenation as needed


- Infectious disease consulted


- Pulmonary hygiene


- In setting of elevated D-Dimer, CTA Chest was ordered which was negative for 

pulmonary embolism


- Anticoagulation per COVID protocol


- Cannot initiate Remdesivir in setting of elevated LFTs


- IV solumedrel changed to PO prednisone and started on taper








(3) Paranoid schizophrenia


Current Visit: Yes   Status: Chronic   


Plan to address problem: 


-History of present schizophrenia


-8/24 Psych reconsulted


-Depakote and Haldol 


-Sleep hygiene


-Reorientation as needed








(4) Leukocytosis


Current Visit: Yes   Status: Acute   


Plan to address problem: 


- Admit WBC 13.8


- Trend CBC


- Maybe secondary to possible PNA or from dehydration


- IV abx completed


- Also on steroids for COVID pneumonia


- Now trending down








(5) Hypertension


Current Visit: Yes   Status: Chronic   


Plan to address problem: 


- BP monitoring per protocol


- PRN hydralizine


- Clonidine TD and hydralazine PO








(6) DVT prophylaxis


Current Visit: Yes   Status: Acute   


Plan to address problem: 


- SCDs to BLE while in bed


- Lovenox subq twice daily per COVID protocol








History


Interval history: 


This is a 55-year-old female with paranoid schizophrenia that presents to the 

emergency department on 8/13 for altered mental status in a catatonic state.  

Previous hospitalizations reviewed and only notes a history of paranoid 

schizophrenia. Patient may be a resident of [place].  Patient is currently 

nonverbal and HPI is received from ER documentation.  Work-up in the emergency 

department included a CT abdomen pelvis which shows patchy peripheral 

groundglass opacities bilateral lungs which is consistent with either viral or 

atypical pneumonia, positive anterior lateral fat hernia in the left mid abdomen

and a large uterine fibroids however her CXR showed no acute pulmonary or 

pleural abnormalities.  She was found to have acute kidney injury with a 

creatinine of 1.4/BUN 33 as baseline from previous records seems to be 0.7, 

rhabdomyolysis with a creatinine kinase of 33,481, leukocytosis with a WBC of 

13.8, thrombocytopenia with platelets at 97.  She has COVID pneumonia with IV 

antibiotics, rhabdomyolysis, and WHITNEY. Infectious disease, psych, and neurology 

have been consulted. Neurology does not suspect NMS at this time. Psych has 

signed off at this time due to ongoing acute medical processes.  PT/ST consulted

for evaluation but unable to be performed as patient will not follow verbal 

commands.  She will open her eyes to verbal stimuli and intermittently follow 

commands.  Patient remains nonverbal and only groans/moans to painful stimuli. 

Currently on RA. Her caregiver has agreed to bring pt home with HHPT. 





* 8/26: continue supportive care, awaiting placement with HHPT


* 8/25: Continue care, psych reconsulted but signed off with some medication 

  adjustments


* 8/24: pureed diet


* 8/21: continue care


* 8/20: GI consult for peg


* 8/19: continue care


* Discussed with primary caretaker patient was normally verbal but sometimes 

  goes into a catatonic state.  Phone number for this is [phone]


* 8/18: PT consulted.  Psych signed off.  Continue treatment


* 8/17: continue treatment


* 8/16: Change fluids to NS 1/2 AT 125CC/hr. Rhabdomylysis improving, insert NGT

  and start tube feeds. Psych input noted. Continue management for COVID 19. ID 

  consulted. No new fever.  CTA chest and CTH obtained which were both negative


* 8/15: COVID PCR Positive








Hospitalist Physical





- Constitutional


Vitals: 


                                        











Temp Pulse Resp BP Pulse Ox


 


 98.0 F   92 H  18   128/71   97 


 


 08/27/20 12:13  08/27/20 12:13  08/27/20 12:13  08/27/20 12:13  08/27/20 12:13











General appearance: Present: no acute distress, obese





- EENT


Eyes: Present: PERRL


ENT: hearing intact





- Neck


Neck: Present: normal ROM





- Respiratory


Respiratory effort: normal


Respiratory: bilateral: diminished (2/2 girth)





- Cardiovascular


Rhythm: regular


Heart Sounds: Present: S1 & S2.  Absent: systolic murmur, diastolic murmur





- Extremities


Extremities: no ischemia, pulses intact, pulses symmetrical, No edema, normal 

temperature, normal color, Full ROM


Peripheral Pulses: within normal limits





- Abdominal


General gastrointestinal: soft, non-tender, non-distended, normal bowel sounds





- Integumentary


Integumentary: Present: clear, warm, dry





- Psychiatric


Psychiatric: other (intermittently responds to verbal stimuli)





- Neurologic


Neurologic: no focal deficits





- Allied Health


Allied health notes reviewed: nursing, social work, case management





Results





- Labs


CBC & Chem 7: 


                                 08/27/20 04:28





                                 08/27/20 04:28


Labs: 


                             Laboratory Last Values











WBC  14.0 K/mm3 (4.5-11.0)  H  08/27/20  04:28    


 


RBC  3.67 M/mm3 (3.65-5.03)   08/27/20  04:28    


 


Hgb  10.9 gm/dl (10.1-14.3)   08/27/20  04:28    


 


Hct  32.6 % (30.3-42.9)   08/27/20  04:28    


 


MCV  89 fl (79-97)   08/27/20  04:28    


 


MCH  30 pg (28-32)   08/27/20  04:28    


 


MCHC  34 % (30-34)   08/27/20  04:28    


 


RDW  15.5 % (13.2-15.2)  H  08/27/20  04:28    


 


Plt Count  117 K/mm3 (140-440)  L  08/27/20  04:28    


 


Lymph % (Auto)  10.6 % (13.4-35.0)  L  08/13/20  22:44    


 


Mono % (Auto)  9.8 % (0.0-7.3)  H  08/13/20  22:44    


 


Eos % (Auto)  0.0 % (0.0-4.3)   08/13/20  22:44    


 


Baso % (Auto)  1.2 % (0.0-1.8)   08/13/20  22:44    


 


Lymph #  1.5 K/mm3 (1.2-5.4)   08/13/20  22:44    


 


Mono #  1.3 K/mm3 (0.0-0.8)  H  08/13/20  22:44    


 


Eos #  0.0 K/mm3 (0.0-0.4)   08/13/20  22:44    


 


Baso #  0.2 K/mm3 (0.0-0.1)  H  08/13/20  22:44    


 


Add Manual Diff  Complete   08/21/20  04:50    


 


Total Counted  100   08/21/20  04:50    


 


Seg Neutrophils %  78.4 % (40.0-70.0)  H  08/13/20  22:44    


 


Seg Neuts % (Manual)  87.0 % (40.0-70.0)  H  08/21/20  04:50    


 


Band Neutrophils %  4.0 %  08/21/20  04:50    


 


Lymphocytes % (Manual)  2.0 % (13.4-35.0)  L  08/21/20  04:50    


 


Reactive Lymphs % (Man)  0 %  08/21/20  04:50    


 


Monocytes % (Manual)  6.0 % (0.0-7.3)   08/21/20  04:50    


 


Eosinophils % (Manual)  0 % (0.0-4.3)   08/21/20  04:50    


 


Basophils % (Manual)  0 % (0.0-1.8)   08/21/20  04:50    


 


Metamyelocytes %  1.0 %  08/21/20  04:50    


 


Myelocytes %  0 %  08/21/20  04:50    


 


Promyelocytes %  0 %  08/21/20  04:50    


 


Blast Cells %  0 %  08/21/20  04:50    


 


Nucleated RBC %  1.0 % (0.0-0.9)  H  08/21/20  04:50    


 


Seg Neutrophils #  10.8 K/mm3 (1.8-7.7)  H  08/13/20  22:44    


 


Seg Neutrophils # Man  14.4 K/mm3 (1.8-7.7)  H  08/21/20  04:50    


 


Band Neutrophils #  0.7 K/mm3  08/21/20  04:50    


 


Lymphocytes # (Manual)  0.3 K/mm3 (1.2-5.4)  L  08/21/20  04:50    


 


Abs React Lymphs (Man)  0.0 K/mm3  08/21/20  04:50    


 


Monocytes # (Manual)  1.0 K/mm3 (0.0-0.8)  H  08/21/20  04:50    


 


Eosinophils # (Manual)  0.0 K/mm3 (0.0-0.4)   08/21/20  04:50    


 


Basophils # (Manual)  0.0 K/mm3 (0.0-0.1)   08/21/20  04:50    


 


Metamyelocytes #  0.2 K/mm3  08/21/20  04:50    


 


Myelocytes #  0.0 K/mm3  08/21/20  04:50    


 


Promyelocytes #  0.0 K/mm3  08/21/20  04:50    


 


Blast Cells #  0.0 K/mm3  08/21/20  04:50    


 


WBC Morphology  Not Reportable   08/21/20  04:50    


 


Hypersegmented Neuts  Not Reportable   08/21/20  04:50    


 


Hyposegmented Neuts  Not Reportable   08/21/20  04:50    


 


Hypogranular Neuts  Not Reportable   08/21/20  04:50    


 


Smudge Cells  Not Reportable   08/21/20  04:50    


 


Toxic Granulation  Not Reportable   08/21/20  04:50    


 


Toxic Vacuolation  Not Reportable   08/21/20  04:50    


 


Dohle Bodies  Not Reportable   08/21/20  04:50    


 


Pelger-Huet Anomaly  Not Reportable   08/21/20  04:50    


 


Arnie Rods  Not Reportable   08/21/20  04:50    


 


Platelet Estimate  Consistent w auto   08/21/20  04:50    


 


Clumped Platelets  Not Reportable   08/21/20  04:50    


 


Plt Clumps, EDTA  Not Reportable   08/21/20  04:50    


 


Large Platelets  Not Reportable   08/21/20  04:50    


 


Giant Platelets  Not Reportable   08/21/20  04:50    


 


Platelet Satelliting  Not Reportable   08/21/20  04:50    


 


Plt Morphology Comment  Not Reportable   08/21/20  04:50    


 


RBC Morphology  Not Reportable   08/21/20  04:50    


 


Dimorphic RBCs  Not Reportable   08/21/20  04:50    


 


Polychromasia  Not Reportable   08/21/20  04:50    


 


Hypochromasia  Not Reportable   08/21/20  04:50    


 


Poikilocytosis  Not Reportable   08/21/20  04:50    


 


Anisocytosis  Not Reportable   08/21/20  04:50    


 


Microcytosis  Not Reportable   08/21/20  04:50    


 


Macrocytosis  1+   08/21/20  04:50    


 


Spherocytes  Not Reportable   08/21/20  04:50    


 


Pappenheimer Bodies  Not Reportable   08/21/20  04:50    


 


Sickle Cells  Not Reportable   08/21/20  04:50    


 


Target Cells  1+   08/21/20  04:50    


 


Tear Drop Cells  Not Reportable   08/21/20  04:50    


 


Ovalocytes  Not Reportable   08/21/20  04:50    


 


Helmet Cells  Not Reportable   08/21/20  04:50    


 


Mancera-Schoeneck Bodies  Not Reportable   08/21/20  04:50    


 


Cabot Rings  Not Reportable   08/21/20  04:50    


 


Jd Cells  Not Reportable   08/21/20  04:50    


 


Bite Cells  Not Reportable   08/21/20  04:50    


 


Crenated Cell  Not Reportable   08/21/20  04:50    


 


Elliptocytes  Not Reportable   08/21/20  04:50    


 


Acanthocytes (Spur)  Not Reportable   08/21/20  04:50    


 


Rouleaux  Not Reportable   08/21/20  04:50    


 


Hemoglobin C Crystals  Not Reportable   08/21/20  04:50    


 


Schistocytes  Not Reportable   08/21/20  04:50    


 


Malaria parasites  Not Reportable   08/21/20  04:50    


 


Dakota Bodies  Not Reportable   08/21/20  04:50    


 


Hem Pathologist Commnt  No   08/21/20  04:50    


 


PT  14.9 Sec. (12.2-14.9)   08/20/20  15:23    


 


INR  1.15  (0.87-1.13)  H  08/20/20  15:23    


 


APTT  29.5 Sec. (24.2-36.6)   08/20/20  15:23    


 


D-Dimer  390.46 ng/mlDDU (0-234)  H  08/22/20  09:32    


 


Sodium  141 mmol/L (137-145)   08/27/20  04:28    


 


Potassium  4.0 mmol/L (3.6-5.0)   08/27/20  04:28    


 


Chloride  104.8 mmol/L ()   08/27/20  04:28    


 


Carbon Dioxide  24 mmol/L (22-30)   08/27/20  04:28    


 


Anion Gap  16 mmol/L  08/27/20  04:28    


 


BUN  20 mg/dL (7-17)  H  08/27/20  04:28    


 


Creatinine  0.8 mg/dL (0.6-1.2)   08/27/20  04:28    


 


Estimated GFR  > 60 ml/min  08/27/20  04:28    


 


BUN/Creatinine Ratio  25 %  08/27/20  04:28    


 


Glucose  164 mg/dL ()  H  08/27/20  04:28    


 


POC Glucose  177  ()  H  08/27/20  12:23    


 


Lactic Acid  1.30 mmol/L (0.7-2.0)   08/13/20  22:44    


 


Calcium  8.4 mg/dL (8.4-10.2)   08/27/20  04:28    


 


Ferritin  991.6 ng/mL (10.0-200.0)  H  08/22/20  09:32    


 


Total Bilirubin  0.40 mg/dL (0.1-1.2)   08/18/20  08:32    


 


Direct Bilirubin  0.3 mg/dL (0-0.2)  H  08/13/20  22:44    


 


Indirect Bilirubin  0.2 mg/dL  08/13/20  22:44    


 


AST  149 units/L (5-40)  H  08/18/20  08:32    


 


ALT  96 units/L (7-56)  H  08/18/20  08:32    


 


Alkaline Phosphatase  40 units/L ()   08/18/20  08:32    


 


Lactate Dehydrogenase  716 units/L ()  H  08/22/20  09:32    


 


Total Creatine Kinase  934 units/L ()  H  08/18/20  08:32    


 


C-Reactive Protein  0.80 mg/dL (0.00-1.30)   08/22/20  09:32    


 


Total Protein  6.1 g/dL (6.3-8.2)  L  08/18/20  08:32    


 


Albumin  2.5 g/dL (3.9-5)  L  08/18/20  08:32    


 


Albumin/Globulin Ratio  0.7 %  08/18/20  08:32    


 


Procalcitonin  0.71 ng/mL (<0.15)   08/18/20  08:32    


 


Urine Color  Tiffany  (Yellow)   08/14/20  Unknown


 


Urine Turbidity  Slightly-cloudy  (Clear)   08/14/20  Unknown


 


Urine pH  5.0  (5.0-7.0)   08/14/20  Unknown


 


Ur Specific Gravity  1.026  (1.003-1.030)   08/14/20  Unknown


 


Urine Protein  100 mg/dl mg/dL (Negative)   08/14/20  Unknown


 


Urine Glucose (UA)  Neg mg/dL (Negative)   08/14/20  Unknown


 


Urine Ketones  Tr mg/dL (Negative)   08/14/20  Unknown


 


Urine Blood  Lg  (Negative)   08/14/20  Unknown


 


Urine Nitrite  Neg  (Negative)   08/14/20  Unknown


 


Urine Bilirubin  Neg  (Negative)   08/14/20  Unknown


 


Urine Urobilinogen  2.0 mg/dL (<2.0)   08/14/20  Unknown


 


Ur Leukocyte Esterase  Neg  (Negative)   08/14/20  Unknown


 


Urine WBC (Auto)  17.0 /HPF (0.0-6.0)  H  08/14/20  Unknown


 


Urine RBC (Auto)  1.0 /HPF (0.0-6.0)   08/14/20  Unknown


 


U Epithel Cells (Auto)  4.0 /HPF (0-13.0)   08/14/20  Unknown


 


Urine Mucus  2+ /HPF  08/14/20  Unknown


 


Salicylates  < 0.3 mg/dL (2.8-20.0)  L  08/13/20  22:44    


 


Urine Opiates Screen  Presumptive negative   08/14/20  Unknown


 


Urine Methadone Screen  Presumptive negative   08/14/20  Unknown


 


Acetaminophen  5.0 ug/mL (10.0-30.0)  L  08/13/20  22:44    


 


Ur Barbiturates Screen  Presumptive negative   08/14/20  Unknown


 


Valproic Acid  31.8 ug/mL ()  L  08/17/20  08:46    


 


Ur Phencyclidine Scrn  Presumptive negative   08/14/20  Unknown


 


Ur Amphetamines Screen  Presumptive negative   08/14/20  Unknown


 


U Benzodiazepines Scrn  Presumptive negative   08/14/20  Unknown


 


Urine Cocaine Screen  Presumptive negative   08/14/20  Unknown


 


U Marijuana (THC) Screen  Presumptive negative   08/14/20  Unknown


 


Drugs of Abuse Note  Disclamer   08/14/20  Unknown


 


Plasma/Serum Alcohol  < 0.01 % (0-0.07)   08/13/20  22:44    


 


Coronavirus (PCR)  Positive  (Negative)  A  08/15/20  Unknown











Schmidt/IV: 


                                        





Voiding Method                   External Female Catheter


IV Catheter Type [Right Upper    Peripheral IV


arm]                             


IV Catheter Type [Right          INT / Saline Lock


Antecubital]                     


IV Catheter Type [Right Foot]    INT / Saline Lock


IV Catheter Type [Right          Peripheral IV


External Jugular]                











Active Medications





- Current Medications


Current Medications: 














Generic Name Dose Route Start Last Admin





  Trade Name Freq  PRN Reason Stop Dose Admin


 


Acetaminophen  650 mg  08/14/20 08:00  08/21/20 12:10





  Tylenol  PO   650 mg





  Q4H PRN   Administration





  Pain MILD(1-3)/Fever >100.5/HA  


 


Amlodipine Besylate  10 mg  08/24/20 19:00  08/27/20 09:53





  Amlodipine  PO   Not Given





  QDAY KATHYA  


 


Lipase/Protease/Amylase  1 each  08/17/20 10:34 





  Pancremarilin Clay 10,500 Unit  FEEDTUBE  





  PRN PRN  





  For Clogged Feeding Tube  


 


Benztropine Mesylate  1 mg  08/24/20 15:00  08/27/20 09:53





  Cogentin  PO   1 mg





  BID KATHYA   Administration


 


Clonidine HCl  0.2 mg  08/24/20 13:00  08/24/20 14:05





  Catapres-Tts Patch  TD   0.2 mg





  Mo KATHYA   Administration


 


Enoxaparin Sodium  40 mg  08/17/20 22:00  08/27/20 09:53





  Enoxaparin  SUB-Q   40 mg





  BID KATHYA   Administration


 


Hydralazine HCl  100 mg  08/24/20 14:00  08/27/20 14:42





  Apresoline  PO   100 mg





  TID KATHYA   Administration


 


Hydralazine HCl  5 mg  08/24/20 16:44 





  Apresoline  IV  





  Q30MIN PRN  





  Hypertension  


 


Insulin Human Lispro  0 unit  08/25/20 22:00  08/27/20 13:21





  Humalog  SUB-Q   2 unit





  ACHS KATHYA   Administration





  Protocol  


 


Miscellaneous Medication  325 mg  08/15/20 18:45 





  Paliperidone Palmitate [Invega Sustenna]  IM  





  Q3W KATHYA  


 


Olanzapine  5 mg  08/25/20 22:00  08/26/20 22:22





  Zyprexa  PO   5 mg





  QHS KATHYA   Administration


 


Ondansetron HCl  4 mg  08/14/20 08:00 





  Zofran  IV  





  Q8H PRN  





  Nausea And Vomiting  


 


Prednisone  30 mg  08/25/20 10:00  08/27/20 09:53





  Deltasone  PO  08/27/20 23:59  30 mg





  QDAY KATHYA   Administration


 


Prednisone  20 mg  08/28/20 10:00 





  Deltasone  PO  08/30/20 23:59 





  QDAY KATHYA  


 


Prednisone  10 mg  08/31/20 10:00 





  Deltasone  PO  09/02/20 23:55 





  QDAY KATHYA  


 


Simple Syrup  15 ml  08/17/20 10:34 





  Simple Syrup  FEEDTUBE  





  PRN PRN  





  Hypoglycemia  


 


Simple Syrup  30 ml  08/17/20 10:34 





  Simple Syrup  FEEDTUBE  





  PRN PRN  





  Hypoglycemia  


 


Sodium Bicarbonate  325 mg  08/17/20 10:34 





  Sodium Bicarbonate  FEEDTUBE  





  PRN PRN  





  For Clogged Feeding Tube  


 


Sodium Chloride  10 ml  08/14/20 10:00  08/27/20 09:53





  Sodium Chloride Flush Syringe 10 Ml  IV   10 ml





  BID KATHYA   Administration


 


Trazodone HCl  50 mg  08/14/20 22:00  08/26/20 22:21





  Desyrel  PO   50 mg





  QHS KATHYA   Administration


 


Valproic Acid  1,500 mg  08/18/20 22:00  08/26/20 22:21





  Depakene Liq  PO   1,500 mg





  QHS KATHYA   Administration














Nutrition/Malnutrition Assess





- Dietary Evaluation


Nutrition/Malnutrition Findings: 


                                        





Nutrition Notes                                            Start:  08/17/20 

09:33


Freq:                                                      Status: Active       




Protocol:                                                                       




 Document     08/26/20 15:29  LM  (Rec: 08/26/20 15:35  LM  [id number])


 Nutrition Notes


     Initial or Follow up                        Reassessment


     Current Diagnosis                           Acute Kidney Injury,Sepsis


     Other Pertinent Diagnosis                   COVID-19 (+), Schizoaffective


                                                 disorder


     Current Diet                                Pureed


     Labs/Tests                                  POC glu 203


     Pertinent Medications                       Humalog


     Height                                      5 ft 6 in


     Weight                                      114.3 kg


     Ideal Body Weight (kg)                      59.09


     BMI                                         40.6


     Subjective/Other Information                Pt with good intakes in chart.


                                                 Pt ate 75% of breakfast today


                                                 . Pt not getting PEG.


     Percent of energy/protein needs met:        85%/83%


     Burn                                        Absent


     Trauma                                      Absent


     GI Symptoms                                 None


     Food Allergy                                No


     Current % PO                                Good (%)


     Minimum of two criteria                     No


     Reduced  Strength                       Measurably Reduced (severe)


     #1


      Nutrition Diagnosis                        Inadequate oral intake


      As Evidenced by Signs and Symptoms         pt eating 75% of pureed diet


      Diagnosis Progress(for reassessment        Improved


       documentation)                            


     Is patient on ventilator?                   No


     Is Patient Ambulatory and/or Out of Bed     No


     [facility]-([facility]-confined to bed)      [phone]


     Kcal/Kg value to use for calculation        14


     Approximate Energy Requirements Using       1600


      kcal/Kg                                    


     Calculation Used for Recommendations        Kcal/kg


     Additional Notes                            Pro needs 0.8-1g/kg adjBW: 69-


                                                 87g/day


                                                 Fluid needs 1ml/kcal


 Nutrition Intervention


     Change Diet Order:                          Continue current diet order as


                                                 tolerated; add consistent CHO


                                                 modifier


     Goal #1                                     Meet at least 75% of energy


                                                 and protein needs


     Anticipated Discharge Needs:                Unable to determine at this


                                                 time


     Follow-Up By:                               09/01/20


     Additional Comments                         F/U for stable intakes











<CASA ULRICH - Last Filed: 08/27/20 17:00>





Assessment and Plan


Assessment and plan: 





I saw and evaluated the patient. I agree with the findings and the plan of care 

as documented in the Nurse Practitioner's~note, with the following corrections 

and additions.








Hospitalist Physical





- Constitutional


Vitals: 


                                        











Temp Pulse Resp BP Pulse Ox


 


 98.0 F   92 H  18   128/71   97 


 


 08/27/20 12:13  08/27/20 12:13  08/27/20 12:13  08/27/20 12:13  08/27/20 12:13














Results





- Labs


CBC & Chem 7: 


                                 08/27/20 04:28





                                 08/27/20 04:28


Labs: 


                             Laboratory Last Values











WBC  14.0 K/mm3 (4.5-11.0)  H  08/27/20  04:28    


 


RBC  3.67 M/mm3 (3.65-5.03)   08/27/20  04:28    


 


Hgb  10.9 gm/dl (10.1-14.3)   08/27/20  04:28    


 


Hct  32.6 % (30.3-42.9)   08/27/20  04:28    


 


MCV  89 fl (79-97)   08/27/20  04:28    


 


MCH  30 pg (28-32)   08/27/20  04:28    


 


MCHC  34 % (30-34)   08/27/20  04:28    


 


RDW  15.5 % (13.2-15.2)  H  08/27/20  04:28    


 


Plt Count  117 K/mm3 (140-440)  L  08/27/20  04:28    


 


Lymph % (Auto)  10.6 % (13.4-35.0)  L  08/13/20  22:44    


 


Mono % (Auto)  9.8 % (0.0-7.3)  H  08/13/20  22:44    


 


Eos % (Auto)  0.0 % (0.0-4.3)   08/13/20  22:44    


 


Baso % (Auto)  1.2 % (0.0-1.8)   08/13/20  22:44    


 


Lymph #  1.5 K/mm3 (1.2-5.4)   08/13/20  22:44    


 


Mono #  1.3 K/mm3 (0.0-0.8)  H  08/13/20  22:44    


 


Eos #  0.0 K/mm3 (0.0-0.4)   08/13/20  22:44    


 


Baso #  0.2 K/mm3 (0.0-0.1)  H  08/13/20  22:44    


 


Add Manual Diff  Complete   08/21/20  04:50    


 


Total Counted  100   08/21/20  04:50    


 


Seg Neutrophils %  78.4 % (40.0-70.0)  H  08/13/20  22:44    


 


Seg Neuts % (Manual)  87.0 % (40.0-70.0)  H  08/21/20  04:50    


 


Band Neutrophils %  4.0 %  08/21/20  04:50    


 


Lymphocytes % (Manual)  2.0 % (13.4-35.0)  L  08/21/20  04:50    


 


Reactive Lymphs % (Man)  0 %  08/21/20  04:50    


 


Monocytes % (Manual)  6.0 % (0.0-7.3)   08/21/20  04:50    


 


Eosinophils % (Manual)  0 % (0.0-4.3)   08/21/20  04:50    


 


Basophils % (Manual)  0 % (0.0-1.8)   08/21/20  04:50    


 


Metamyelocytes %  1.0 %  08/21/20  04:50    


 


Myelocytes %  0 %  08/21/20  04:50    


 


Promyelocytes %  0 %  08/21/20  04:50    


 


Blast Cells %  0 %  08/21/20  04:50    


 


Nucleated RBC %  1.0 % (0.0-0.9)  H  08/21/20  04:50    


 


Seg Neutrophils #  10.8 K/mm3 (1.8-7.7)  H  08/13/20  22:44    


 


Seg Neutrophils # Man  14.4 K/mm3 (1.8-7.7)  H  08/21/20  04:50    


 


Band Neutrophils #  0.7 K/mm3  08/21/20  04:50    


 


Lymphocytes # (Manual)  0.3 K/mm3 (1.2-5.4)  L  08/21/20  04:50    


 


Abs React Lymphs (Man)  0.0 K/mm3  08/21/20  04:50    


 


Monocytes # (Manual)  1.0 K/mm3 (0.0-0.8)  H  08/21/20  04:50    


 


Eosinophils # (Manual)  0.0 K/mm3 (0.0-0.4)   08/21/20  04:50    


 


Basophils # (Manual)  0.0 K/mm3 (0.0-0.1)   08/21/20  04:50    


 


Metamyelocytes #  0.2 K/mm3  08/21/20  04:50    


 


Myelocytes #  0.0 K/mm3  08/21/20  04:50    


 


Promyelocytes #  0.0 K/mm3  08/21/20  04:50    


 


Blast Cells #  0.0 K/mm3  08/21/20  04:50    


 


WBC Morphology  Not Reportable   08/21/20  04:50    


 


Hypersegmented Neuts  Not Reportable   08/21/20  04:50    


 


Hyposegmented Neuts  Not Reportable   08/21/20  04:50    


 


Hypogranular Neuts  Not Reportable   08/21/20  04:50    


 


Smudge Cells  Not Reportable   08/21/20  04:50    


 


Toxic Granulation  Not Reportable   08/21/20  04:50    


 


Toxic Vacuolation  Not Reportable   08/21/20  04:50    


 


Dohle Bodies  Not Reportable   08/21/20  04:50    


 


Pelger-Huet Anomaly  Not Reportable   08/21/20  04:50    


 


Arnie Rods  Not Reportable   08/21/20  04:50    


 


Platelet Estimate  Consistent w auto   08/21/20  04:50    


 


Clumped Platelets  Not Reportable   08/21/20  04:50    


 


Plt Clumps, EDTA  Not Reportable   08/21/20  04:50    


 


Large Platelets  Not Reportable   08/21/20  04:50    


 


Giant Platelets  Not Reportable   08/21/20  04:50    


 


Platelet Satelliting  Not Reportable   08/21/20  04:50    


 


Plt Morphology Comment  Not Reportable   08/21/20  04:50    


 


RBC Morphology  Not Reportable   08/21/20  04:50    


 


Dimorphic RBCs  Not Reportable   08/21/20  04:50    


 


Polychromasia  Not Reportable   08/21/20  04:50    


 


Hypochromasia  Not Reportable   08/21/20  04:50    


 


Poikilocytosis  Not Reportable   08/21/20  04:50    


 


Anisocytosis  Not Reportable   08/21/20  04:50    


 


Microcytosis  Not Reportable   08/21/20  04:50    


 


Macrocytosis  1+   08/21/20  04:50    


 


Spherocytes  Not Reportable   08/21/20  04:50    


 


Pappenheimer Bodies  Not Reportable   08/21/20  04:50    


 


Sickle Cells  Not Reportable   08/21/20  04:50    


 


Target Cells  1+   08/21/20  04:50    


 


Tear Drop Cells  Not Reportable   08/21/20  04:50    


 


Ovalocytes  Not Reportable   08/21/20  04:50    


 


Helmet Cells  Not Reportable   08/21/20  04:50    


 


Mancera-Schoeneck Bodies  Not Reportable   08/21/20  04:50    


 


Cabot Rings  Not Reportable   08/21/20  04:50    


 


Jd Cells  Not Reportable   08/21/20  04:50    


 


Bite Cells  Not Reportable   08/21/20  04:50    


 


Crenated Cell  Not Reportable   08/21/20  04:50    


 


Elliptocytes  Not Reportable   08/21/20  04:50    


 


Acanthocytes (Spur)  Not Reportable   08/21/20  04:50    


 


Rouleaux  Not Reportable   08/21/20  04:50    


 


Hemoglobin C Crystals  Not Reportable   08/21/20  04:50    


 


Schistocytes  Not Reportable   08/21/20  04:50    


 


Malaria parasites  Not Reportable   08/21/20  04:50    


 


D[place]ota Bodies  Not Reportable   08/21/20  04:50    


 


Hem Pathologist Commnt  No   08/21/20  04:50    


 


PT  14.9 Sec. (12.2-14.9)   08/20/20  15:23    


 


INR  1.15  (0.87-1.13)  H  08/20/20  15:23    


 


APTT  29.5 Sec. (24.2-36.6)   08/20/20  15:23    


 


D-Dimer  390.46 ng/mlDDU (0-234)  H  08/22/20  09:32    


 


Sodium  141 mmol/L (137-145)   08/27/20  04:28    


 


Potassium  4.0 mmol/L (3.6-5.0)   08/27/20  04:28    


 


Chloride  104.8 mmol/L ()   08/27/20  04:28    


 


Carbon Dioxide  24 mmol/L (22-30)   08/27/20  04:28    


 


Anion Gap  16 mmol/L  08/27/20  04:28    


 


BUN  20 mg/dL (7-17)  H  08/27/20  04:28    


 


Creatinine  0.8 mg/dL (0.6-1.2)   08/27/20  04:28    


 


Estimated GFR  > 60 ml/min  08/27/20  04:28    


 


BUN/Creatinine Ratio  25 %  08/27/20  04:28    


 


Glucose  164 mg/dL ()  H  08/27/20  04:28    


 


POC Glucose  177  ()  H  08/27/20  12:23    


 


Lactic Acid  1.30 mmol/L (0.7-2.0)   08/13/20  22:44    


 


Calcium  8.4 mg/dL (8.4-10.2)   08/27/20  04:28    


 


Ferritin  991.6 ng/mL (10.0-200.0)  H  08/22/20  09:32    


 


Total Bilirubin  0.40 mg/dL (0.1-1.2)   08/18/20  08:32    


 


Direct Bilirubin  0.3 mg/dL (0-0.2)  H  08/13/20  22:44    


 


Indirect Bilirubin  0.2 mg/dL  08/13/20  22:44    


 


AST  149 units/L (5-40)  H  08/18/20  08:32    


 


ALT  96 units/L (7-56)  H  08/18/20  08:32    


 


Alkaline Phosphatase  40 units/L ()   08/18/20  08:32    


 


Lactate Dehydrogenase  716 units/L ()  H  08/22/20  09:32    


 


Total Creatine Kinase  934 units/L ()  H  08/18/20  08:32    


 


C-Reactive Protein  0.80 mg/dL (0.00-1.30)   08/22/20  09:32    


 


Total Protein  6.1 g/dL (6.3-8.2)  L  08/18/20  08:32    


 


Albumin  2.5 g/dL (3.9-5)  L  08/18/20  08:32    


 


Albumin/Globulin Ratio  0.7 %  08/18/20  08:32    


 


Procalcitonin  0.71 ng/mL (<0.15)   08/18/20  08:32    


 


Urine Color  Tiffany  (Yellow)   08/14/20  Unknown


 


Urine Turbidity  Slightly-cloudy  (Clear)   08/14/20  Unknown


 


Urine pH  5.0  (5.0-7.0)   08/14/20  Unknown


 


Ur Specific Gravity  1.026  (1.003-1.030)   08/14/20  Unknown


 


Urine Protein  100 mg/dl mg/dL (Negative)   08/14/20  Unknown


 


Urine Glucose (UA)  Neg mg/dL (Negative)   08/14/20  Unknown


 


Urine Ketones  Tr mg/dL (Negative)   08/14/20  Unknown


 


Urine Blood  Lg  (Negative)   08/14/20  Unknown


 


Urine Nitrite  Neg  (Negative)   08/14/20  Unknown


 


Urine Bilirubin  Neg  (Negative)   08/14/20  Unknown


 


Urine Urobilinogen  2.0 mg/dL (<2.0)   08/14/20  Unknown


 


Ur Leukocyte Esterase  Neg  (Negative)   08/14/20  Unknown


 


Urine WBC (Auto)  17.0 /HPF (0.0-6.0)  H  08/14/20  Unknown


 


Urine RBC (Auto)  1.0 /HPF (0.0-6.0)   08/14/20  Unknown


 


U Epithel Cells (Auto)  4.0 /HPF (0-13.0)   08/14/20  Unknown


 


Urine Mucus  2+ /HPF  08/14/20  Unknown


 


Salicylates  < 0.3 mg/dL (2.8-20.0)  L  08/13/20  22:44    


 


Urine Opiates Screen  Presumptive negative   08/14/20  Unknown


 


Urine Methadone Screen  Presumptive negative   08/14/20  Unknown


 


Acetaminophen  5.0 ug/mL (10.0-30.0)  L  08/13/20  22:44    


 


Ur Barbiturates Screen  Presumptive negative   08/14/20  Unknown


 


Valproic Acid  31.8 ug/mL ()  L  08/17/20  08:46    


 


Ur Phencyclidine Scrn  Presumptive negative   08/14/20  Unknown


 


Ur Amphetamines Screen  Presumptive negative   08/14/20  Unknown


 


U Benzodiazepines Scrn  Presumptive negative   08/14/20  Unknown


 


Urine Cocaine Screen  Presumptive negative   08/14/20  Unknown


 


U Marijuana (THC) Screen  Presumptive negative   08/14/20  Unknown


 


Drugs of Abuse Note  Disclamer   08/14/20  Unknown


 


Plasma/Serum Alcohol  < 0.01 % (0-0.07)   08/13/20  22:44    


 


Coronavirus (PCR)  Positive  (Negative)  A  08/15/20  Unknown











Schmidt/IV: 


                                        





Voiding Method                   External Female Catheter


IV Catheter Type [Right Upper    Peripheral IV


arm]                             


IV Catheter Type [Right          INT / Saline Lock


Antecubital]                     


IV Catheter Type [Right Foot]    INT / Saline Lock


IV Catheter Type [Right          Peripheral IV


External Jugular]                











Active Medications





- Current Medications


Current Medications: 














Generic Name Dose Route Start Last Admin





  Trade Name Freq  PRN Reason Stop Dose Admin


 


Acetaminophen  650 mg  08/14/20 08:00  08/21/20 12:10





  Tylenol  PO   650 mg





  Q4H PRN   Administration





  Pain MILD(1-3)/Fever >100.5/HA  


 


Amlodipine Besylate  10 mg  08/24/20 19:00  08/27/20 09:53





  Amlodipine  PO   Not Given





  QDAY KATHYA  


 


Lipase/Protease/Amylase  1 each  08/17/20 10:34 





  Pancreazkim Clay 10,500 Unit  FEEDTUBE  





  PRN PRN  





  For Clogged Feeding Tube  


 


Benztropine Mesylate  1 mg  08/24/20 15:00  08/27/20 09:53





  Cogentin  PO   1 mg





  BID KATHYA   Administration


 


Clonidine HCl  0.2 mg  08/24/20 13:00  08/24/20 14:05





  Catapres-Tts Patch  TD   0.2 mg





  Mo KATHYA   Administration


 


Enoxaparin Sodium  40 mg  08/17/20 22:00  08/27/20 09:53





  Enoxaparin  SUB-Q   40 mg





  BID KATHYA   Administration


 


Hydralazine HCl  5 mg  08/24/20 16:44 





  Apresoline  IV  





  Q30MIN PRN  





  Hypertension  


 


Hydralazine HCl  75 mg  08/27/20 15:55 





  Apresoline  PO  





  TID KATHYA  


 


Insulin Human Lispro  0 unit  08/25/20 22:00  08/27/20 13:21





  Humalog  SUB-Q   2 unit





  ACHS KATHYA   Administration





  Protocol  


 


Miscellaneous Medication  325 mg  08/15/20 18:45 





  Paliperidone Palmitate [Invega Sustenna]  IM  





  Q3W KATHYA  


 


Olanzapine  5 mg  08/25/20 22:00  08/26/20 22:22





  Zyprexa  PO   5 mg





  QHS KATHYA   Administration


 


Ondansetron HCl  4 mg  08/14/20 08:00 





  Zofran  IV  





  Q8H PRN  





  Nausea And Vomiting  


 


Prednisone  30 mg  08/25/20 10:00  08/27/20 09:53





  Deltasone  PO  08/27/20 23:59  30 mg





  QDAY KATHYA   Administration


 


Prednisone  20 mg  08/28/20 10:00 





  Deltasone  PO  08/30/20 23:59 





  QDAY KATHYA  


 


Prednisone  10 mg  08/31/20 10:00 





  Deltasone  PO  09/02/20 23:55 





  QDAY KATHYA  


 


Simple Syrup  15 ml  08/17/20 10:34 





  Simple Syrup  FEEDTUBE  





  PRN PRN  





  Hypoglycemia  


 


Simple Syrup  30 ml  08/17/20 10:34 





  Simple Syrup  FEEDTUBE  





  PRN PRN  





  Hypoglycemia  


 


Sodium Bicarbonate  325 mg  08/17/20 10:34 





  Sodium Bicarbonate  FEEDTUBE  





  PRN PRN  





  For Clogged Feeding Tube  


 


Sodium Chloride  10 ml  08/14/20 10:00  08/27/20 09:53





  Sodium Chloride Flush Syringe 10 Ml  IV   10 ml





  BID KATHYA   Administration


 


Trazodone HCl  50 mg  08/14/20 22:00  08/26/20 22:21





  Desyrel  PO   50 mg





  QHS KATHYA   Administration


 


Valproic Acid  1,500 mg  08/18/20 22:00  08/26/20 22:21





  Depakene Liq  PO   1,500 mg





  QHS KATHYA   Administration














Nutrition/Malnutrition Assess





- Dietary Evaluation


Nutrition/Malnutrition Findings: 


                                        





Nutrition Notes                                            Start:  08/17/20 

09:33


Freq:                                                      Status: Active       




Protocol:                                                                       




 Document     08/26/20 15:29  LM  (Rec: 08/26/20 15:35  LM  [id number])


 Nutrition Notes


     Initial or Follow up                        Reassessment


     Current Diagnosis                           Acute Kidney Injury,Sepsis


     Other Pertinent Diagnosis                   COVID-19 (+), Schizoaffective


                                                 disorder


     Current Diet                                Pureed


     Labs/Tests                                  POC glu 203


     Pertinent Medications                       Humalog


     Height                                      5 ft 6 in


     Weight                                      114.3 kg


     Ideal Body Weight (kg)                      59.09


     BMI                                         40.6


     Subjective/Other Information                Pt with good intakes in chart.


                                                 Pt ate 75% of breakfast today


                                                 . Pt not getting PEG.


     Percent of energy/protein needs met:        85%/83%


     Burn                                        Absent


     Trauma                                      Absent


     GI Symptoms                                 None


     Food Allergy                                No


     Current % PO                                Good (%)


     Minimum of two criteria                     No


     Reduced  Strength                       Measurably Reduced (severe)


     #1


      Nutrition Diagnosis                        Inadequate oral intake


      As Evidenced by Signs and Symptoms         pt eating 75% of pureed diet


      Diagnosis Progress(for reassessment        Improved


       documentation)                            


     Is patient on ventilator?                   No


     Is Patient Ambulatory and/or Out of Bed     No


     REE-([facility]-confined to bed)      [phone]


     Kcal/Kg value to use for calculation        14


     Approximate Energy Requirements Using       1600


      kcal/Kg                                    


     Calculation Used for Recommendations        Kcal/kg


     Additional Notes                            Pro needs 0.8-1g/kg adjBW: 69-


                                                 87g/day


                                                 Fluid needs 1ml/kcal


 Nutrition Intervention


     Change Diet Order:                          Continue current diet order as


                                                 tolerated; add consistent CHO


                                                 modifier


     Goal #1                                     Meet at least 75% of energy


                                                 and protein needs


     Anticipated Discharge Needs:                Unable to determine at this


                                                 time


     Follow-Up By:                               09/01/20


     Additional Comments                         F/U for stable intakes

## 2020-08-27 NOTE — PROGRESS NOTE
Assessment and Plan


Cultures:


Blood culture and urine culture negative


COVID PCR positive


 


Assessment: 55 years old female with history of paranoid schizophrenia, admitted

on 8/13/2020 due to altered mental status and catatonia state:





#Severe sepsis: Present on admission with low-grade fever, tachycardia, elevated

leukocytosis, WHITNEY, likely due to bilateral pneumonia. Resolved.





#Bilateral pneumonia: COVID-19 pneumonia.  Inflammatory markers are elevated, 

with a d-dimer > 10,000.  CT negative for pulmonary embolism. Repeat d-dimer 

with much improvement. Renal function and LFTs improving, but given mild hypoxia

and several days of positivity, unclear benefit with Remdesivir at this stage. 

Completed empiric CAP abx x 5 days. Completed steroids. 





#Acute hypoxemic respiratory failure: resolved, on room air.





#Elevated LFTs: from COVID/rhabdo





#WHITNEY: from COVID. improved





#Elevated CK: Likely secondary to rhabdomyolysis possible due to COVID-19 

infection or catatonic state





#Paranoid schizophrenia with catatonia: Per psych





Recommendations:


Overall, stable from ID standpoint


check d-dimer every 2-3 days due to increased risk of VTE in patients with 

COVID-19





Blank Coppola MD, FACP


[facility] ([facility])


C: [phone]


O: [phone]


F: [phone]





Subjective


Date of service: 08/27/20


Principal diagnosis: Neurogenic Dysphagia


Interval history: 


No fever.  Remains on room air.  





Objective





- Exam


Narrative Exam: 


Physical Exam (reviewed in chart due to PPE conservation)


Constitutional: limited due to PPE conservation strategy


Head, Ears, Nose: limited due to PPE conservation strategy


Eyes: limited due to PPE conservation strategy


Neck: limited due to PPE conservation strategy


Oral: limited due to PPE conservation strategy


Cardiovascular: limited due to PPE conservation strategy


Respiratory: limited due to PPE conservation strategy


GI: limited due to PPE conservation strategy


Musculoskeletal: limited due to PPE conservation strategy


Skin: limited due to PPE conservation strategy


Hem/Lymphatic: limited due to PPE conservation strategy


Psych: limited due to PPE conservation strategy


Neurological: limited due to PPE conservation strategy         





- Constitutional


Vitals: 


                                   Vital Signs











Temp Pulse Resp BP Pulse Ox


 


 98.1 F   94 H  19   103/52   99 


 


 08/27/20 09:50  08/27/20 09:50  08/27/20 09:50  08/27/20 09:50  08/27/20 09:50








                           Temperature -Last 24 Hours











Temperature                    98.1 F


 


Temperature                    97.3 F


 


Temperature                    98.5 F


 


Temperature                    98.6 F


 


Temperature                    97.6 F


 


Temperature                    98.5 F

















- Labs


CBC & Chem 7: 


                                 08/27/20 04:28





                                 08/27/20 04:28


Labs: 


                              Abnormal lab results











  08/26/20 08/26/20 08/27/20 Range/Units





  16:30 22:27 04:28 


 


WBC    14.0 H  (4.5-11.0)  K/mm3


 


RDW    15.5 H  (13.2-15.2)  %


 


Plt Count    117 L  (140-440)  K/mm3


 


BUN     (7-17)  mg/dL


 


Glucose     ()  mg/dL


 


POC Glucose  249 H  200 H   ()  














  08/27/20 08/27/20 Range/Units





  04:28 12:23 


 


WBC    (4.5-11.0)  K/mm3


 


RDW    (13.2-15.2)  %


 


Plt Count    (140-440)  K/mm3


 


BUN  20 H   (7-17)  mg/dL


 


Glucose  164 H   ()  mg/dL


 


POC Glucose   177 H  ()

## 2020-08-28 LAB
HCT VFR BLD CALC: 39.7 % (ref 30.3–42.9)
HGB BLD-MCNC: 13.1 GM/DL (ref 10.1–14.3)
MCHC RBC AUTO-ENTMCNC: 33 % (ref 30–34)
MCV RBC AUTO: 91 FL (ref 79–97)
PLATELET # BLD: 132 K/MM3 (ref 140–440)
RBC # BLD AUTO: 4.38 M/MM3 (ref 3.65–5.03)

## 2020-08-28 RX ADMIN — INSULIN LISPRO SCH: 100 INJECTION, SOLUTION INTRAVENOUS; SUBCUTANEOUS at 23:24

## 2020-08-28 RX ADMIN — INSULIN LISPRO SCH UNIT: 100 INJECTION, SOLUTION INTRAVENOUS; SUBCUTANEOUS at 17:45

## 2020-08-28 RX ADMIN — INSULIN LISPRO SCH: 100 INJECTION, SOLUTION INTRAVENOUS; SUBCUTANEOUS at 18:16

## 2020-08-28 RX ADMIN — INSULIN LISPRO SCH UNIT: 100 INJECTION, SOLUTION INTRAVENOUS; SUBCUTANEOUS at 12:45

## 2020-08-28 RX ADMIN — Medication SCH ML: at 22:52

## 2020-08-28 RX ADMIN — ENOXAPARIN SODIUM SCH MG: 100 INJECTION SUBCUTANEOUS at 22:50

## 2020-08-28 RX ADMIN — VALPROIC ACID SCH MG: 250 SOLUTION ORAL at 22:51

## 2020-08-28 RX ADMIN — BENZTROPINE MESYLATE SCH MG: 1 TABLET ORAL at 22:50

## 2020-08-28 RX ADMIN — Medication SCH ML: at 11:38

## 2020-08-28 RX ADMIN — ENOXAPARIN SODIUM SCH MG: 100 INJECTION SUBCUTANEOUS at 11:36

## 2020-08-28 RX ADMIN — INSULIN LISPRO SCH: 100 INJECTION, SOLUTION INTRAVENOUS; SUBCUTANEOUS at 11:37

## 2020-08-28 RX ADMIN — BENZTROPINE MESYLATE SCH MG: 1 TABLET ORAL at 11:37

## 2020-08-28 NOTE — PROGRESS NOTE
<KWAKUPADMA HMarco A - Last Filed: 08/28/20 15:44>





Assessment and Plan





- Patient Problems


(1) Pneumonia due to COVID-19 virus


Current Visit: Yes   Status: Acute   


Plan to address problem: 


- COVID protocol initiated


- IV azithromycin and Rocephin completed


- 8/14 COVID PCR positive


- Trend LDH, CRP, procalcitonin, d-dimer, ferritin 


- Droplet /contact precautions


- Supplemental oxygenation as needed


- Infectious disease consulted


- Pulmonary hygiene


- In setting of elevated D-Dimer, CTA Chest was ordered which was negative for 

pulmonary embolism


- Anticoagulation per COVID protocol, will DC with Eliquis 2.5 BID PO for 30 

days


- Cannot initiate Remdesivir in setting of elevated LFTs


- IV solumedrel changed to PO prednisone and started on taper








(2) Hypertension


Current Visit: Yes   Status: Chronic   


Plan to address problem: 


- BP monitoring per protocol


- PRN hydralizine


- Clonidine TD and hydralazine PO








(3) Paranoid schizophrenia


Current Visit: Yes   Status: Chronic   


Plan to address problem: 


-History of present schizophrenia


-8/24 Psych reconsulted


-Depakote and Haldol 


-Sleep hygiene


-Reorientation as needed








(4) Severe sepsis


Current Visit: Yes   Status: Resolved   


Plan to address problem: 


- Presented with low-grade fever, tachycardia, leukocytosis, WHITNEY, and bilateral 

pneumonia


- Likely source is bilateral pneumonia secondary to COVID-19


- IV antibiotics completed


- Covid protocol initiated











(5) DVT prophylaxis


Current Visit: Yes   Status: Acute   


Plan to address problem: 


- SCDs to BLE while in bed


- Lovenox subq twice daily per COVID protocol








(6) Discharge planning issues


Current Visit: Yes   Status: Acute   





History


Interval history: 


This is a 55-year-old female with paranoid schizophrenia that presents to the 

emergency department on 8/13 for altered mental status in a catatonic state.  

Previous hospitalizations reviewed and only notes a history of paranoid 

schizophrenia. Patient may be a resident of [place].  Patient is currently 

nonverbal and HPI is received from ER documentation.  Work-up in the emergency 

department included a CT abdomen pelvis which shows patchy peripheral 

groundglass opacities bilateral lungs which is consistent with either viral or 

atypical pneumonia, positive anterior lateral fat hernia in the left mid abdomen

and a large uterine fibroids however her CXR showed no acute pulmonary or 

pleural abnormalities.  She was found to have acute kidney injury with a crea

tinine of 1.4/BUN 33 as baseline from previous records seems to be 0.7, 

rhabdomyolysis with a creatinine kinase of 33,481, leukocytosis with a WBC of 

13.8, thrombocytopenia with platelets at 97.  She has COVID pneumonia with IV 

antibiotics, rhabdomyolysis, and WHITNEY. Infectious disease, psych, and neurology 

have been consulted. Neurology does not suspect NMS at this time. Psych has 

signed off at this time due to ongoing acute medical processes.  PT/ST consulted

for evaluation but unable to be performed as patient will not follow verbal 

commands.  She will open her eyes to verbal stimuli and intermittently follow 

commands.  Patient remains nonverbal and only groans/moans to painful stimuli. 

Currently on RA. Her caregiver has agreed to bring pt home with HHPT. 





* 8/27: continue care, pending discharge details w/ CM and caregiver


* 8/26: continue supportive care, awaiting placement with HHPT


* 8/25: Continue care, psych reconsulted but signed off with some medication 

  adjustments


* 8/24: pureed diet


* 8/21: continue care


* 8/20: GI consult for peg


* 8/19: continue care


* Discussed with primary caretaker patient was normally verbal but sometimes 

  goes into a catatonic state.  Phone number for this is [phone]


* 8/18: PT consulted.  Psych signed off.  Continue treatment


* 8/17: continue treatment


* 8/16: Change fluids to NS 1/2 AT 125CC/hr. Rhabdomylysis improving, insert NGT

  and start tube feeds. Psych input noted. Continue management for COVID 19. ID 

  consulted. No new fever.  CTA chest and CTH obtained which were both negative


* 8/15: COVID PCR Positive








Hospitalist Physical





- Constitutional


Vitals: 


                                        











Temp Pulse Resp BP Pulse Ox


 


 98.2 F   86   18   138/84   98 


 


 08/28/20 05:00  08/28/20 11:36  08/28/20 05:00  08/28/20 05:47  08/28/20 05:47











General appearance: Present: no acute distress





- EENT


Eyes: Present: PERRL





- Neck


Neck: Present: normal ROM





- Respiratory


Respiratory effort: normal


Respiratory: bilateral: diminished (2/2 girth)





- Cardiovascular


Rhythm: regular


Heart Sounds: Present: S1 & S2.  Absent: systolic murmur, diastolic murmur





- Extremities


Extremities: no ischemia, pulses intact, pulses symmetrical, No edema, normal 

temperature, normal color, Full ROM


Peripheral Pulses: within normal limits





- Abdominal


General gastrointestinal: soft, non-tender, normal bowel sounds





- Integumentary


Integumentary: Present: clear, warm, dry





- Psychiatric


Psychiatric: cooperative





- Neurologic


Neurologic: no focal deficits, moves all extremities





- Allied Health


Allied health notes reviewed: nursing, social work, case management





Results





- Labs


CBC & Chem 7: 


                                 08/28/20 08:38





                                 08/27/20 04:28


Labs: 


                             Laboratory Last Values











WBC  11.1 K/mm3 (4.5-11.0)  H  08/28/20  08:38    


 


RBC  4.38 M/mm3 (3.65-5.03)   08/28/20  08:38    


 


Hgb  13.1 gm/dl (10.1-14.3)   08/28/20  08:38    


 


Hct  39.7 % (30.3-42.9)  D 08/28/20  08:38    


 


MCV  91 fl (79-97)   08/28/20  08:38    


 


MCH  30 pg (28-32)   08/28/20  08:38    


 


MCHC  33 % (30-34)   08/28/20  08:38    


 


RDW  16.0 % (13.2-15.2)  H  08/28/20  08:38    


 


Plt Count  132 K/mm3 (140-440)  L  08/28/20  08:38    


 


Lymph % (Auto)  10.6 % (13.4-35.0)  L  08/13/20  22:44    


 


Mono % (Auto)  9.8 % (0.0-7.3)  H  08/13/20  22:44    


 


Eos % (Auto)  0.0 % (0.0-4.3)   08/13/20  22:44    


 


Baso % (Auto)  1.2 % (0.0-1.8)   08/13/20  22:44    


 


Lymph #  1.5 K/mm3 (1.2-5.4)   08/13/20  22:44    


 


Mono #  1.3 K/mm3 (0.0-0.8)  H  08/13/20  22:44    


 


Eos #  0.0 K/mm3 (0.0-0.4)   08/13/20  22:44    


 


Baso #  0.2 K/mm3 (0.0-0.1)  H  08/13/20  22:44    


 


Add Manual Diff  Complete   08/21/20  04:50    


 


Total Counted  100   08/21/20  04:50    


 


Seg Neutrophils %  78.4 % (40.0-70.0)  H  08/13/20  22:44    


 


Seg Neuts % (Manual)  87.0 % (40.0-70.0)  H  08/21/20  04:50    


 


Band Neutrophils %  4.0 %  08/21/20  04:50    


 


Lymphocytes % (Manual)  2.0 % (13.4-35.0)  L  08/21/20  04:50    


 


Reactive Lymphs % (Man)  0 %  08/21/20  04:50    


 


Monocytes % (Manual)  6.0 % (0.0-7.3)   08/21/20  04:50    


 


Eosinophils % (Manual)  0 % (0.0-4.3)   08/21/20  04:50    


 


Basophils % (Manual)  0 % (0.0-1.8)   08/21/20  04:50    


 


Metamyelocytes %  1.0 %  08/21/20  04:50    


 


Myelocytes %  0 %  08/21/20  04:50    


 


Promyelocytes %  0 %  08/21/20  04:50    


 


Blast Cells %  0 %  08/21/20  04:50    


 


Nucleated RBC %  1.0 % (0.0-0.9)  H  08/21/20  04:50    


 


Seg Neutrophils #  10.8 K/mm3 (1.8-7.7)  H  08/13/20  22:44    


 


Seg Neutrophils # Man  14.4 K/mm3 (1.8-7.7)  H  08/21/20  04:50    


 


Band Neutrophils #  0.7 K/mm3  08/21/20  04:50    


 


Lymphocytes # (Manual)  0.3 K/mm3 (1.2-5.4)  L  08/21/20  04:50    


 


Abs React Lymphs (Man)  0.0 K/mm3  08/21/20  04:50    


 


Monocytes # (Manual)  1.0 K/mm3 (0.0-0.8)  H  08/21/20  04:50    


 


Eosinophils # (Manual)  0.0 K/mm3 (0.0-0.4)   08/21/20  04:50    


 


Basophils # (Manual)  0.0 K/mm3 (0.0-0.1)   08/21/20  04:50    


 


Metamyelocytes #  0.2 K/mm3  08/21/20  04:50    


 


Myelocytes #  0.0 K/mm3  08/21/20  04:50    


 


Promyelocytes #  0.0 K/mm3  08/21/20  04:50    


 


Blast Cells #  0.0 K/mm3  08/21/20  04:50    


 


WBC Morphology  Not Reportable   08/21/20  04:50    


 


Hypersegmented Neuts  Not Reportable   08/21/20  04:50    


 


Hyposegmented Neuts  Not Reportable   08/21/20  04:50    


 


Hypogranular Neuts  Not Reportable   08/21/20  04:50    


 


Smudge Cells  Not Reportable   08/21/20  04:50    


 


Toxic Granulation  Not Reportable   08/21/20  04:50    


 


Toxic Vacuolation  Not Reportable   08/21/20  04:50    


 


Dohle Bodies  Not Reportable   08/21/20  04:50    


 


Pelger-Huet Anomaly  Not Reportable   08/21/20  04:50    


 


Arnie Rods  Not Reportable   08/21/20  04:50    


 


Platelet Estimate  Consistent w auto   08/21/20  04:50    


 


Clumped Platelets  Not Reportable   08/21/20  04:50    


 


Plt Clumps, EDTA  Not Reportable   08/21/20  04:50    


 


Large Platelets  Not Reportable   08/21/20  04:50    


 


Giant Platelets  Not Reportable   08/21/20  04:50    


 


Platelet Satelliting  Not Reportable   08/21/20  04:50    


 


Plt Morphology Comment  Not Reportable   08/21/20  04:50    


 


RBC Morphology  Not Reportable   08/21/20  04:50    


 


Dimorphic RBCs  Not Reportable   08/21/20  04:50    


 


Polychromasia  Not Reportable   08/21/20  04:50    


 


Hypochromasia  Not Reportable   08/21/20  04:50    


 


Poikilocytosis  Not Reportable   08/21/20  04:50    


 


Anisocytosis  Not Reportable   08/21/20  04:50    


 


Microcytosis  Not Reportable   08/21/20  04:50    


 


Macrocytosis  1+   08/21/20  04:50    


 


Spherocytes  Not Reportable   08/21/20  04:50    


 


Pappenheimer Bodies  Not Reportable   08/21/20  04:50    


 


Sickle Cells  Not Reportable   08/21/20  04:50    


 


Target Cells  1+   08/21/20  04:50    


 


Tear Drop Cells  Not Reportable   08/21/20  04:50    


 


Ovalocytes  Not Reportable   08/21/20  04:50    


 


Helmet Cells  Not Reportable   08/21/20  04:50    


 


Mancera-Upper [place] Bodies  Not Reportable   08/21/20  04:50    


 


Cabot Rings  Not Reportable   08/21/20  04:50    


 


Jd Cells  Not Reportable   08/21/20  04:50    


 


Bite Cells  Not Reportable   08/21/20  04:50    


 


Crenated Cell  Not Reportable   08/21/20  04:50    


 


Elliptocytes  Not Reportable   08/21/20  04:50    


 


Acanthocytes (Spur)  Not Reportable   08/21/20  04:50    


 


Rouleaux  Not Reportable   08/21/20  04:50    


 


Hemoglobin C Crystals  Not Reportable   08/21/20  04:50    


 


Schistocytes  Not Reportable   08/21/20  04:50    


 


Malaria parasites  Not Reportable   08/21/20  04:50    


 


Dakota Bodies  Not Reportable   08/21/20  04:50    


 


Hem Pathologist Commnt  No   08/21/20  04:50    


 


PT  14.9 Sec. (12.2-14.9)   08/20/20  15:23    


 


INR  1.15  (0.87-1.13)  H  08/20/20  15:23    


 


APTT  29.5 Sec. (24.2-36.6)   08/20/20  15:23    


 


D-Dimer  154.77 ng/mlDDU (0-234)   08/28/20  08:38    


 


Sodium  141 mmol/L (137-145)   08/27/20  04:28    


 


Potassium  4.0 mmol/L (3.6-5.0)   08/27/20  04:28    


 


Chloride  104.8 mmol/L ()   08/27/20  04:28    


 


Carbon Dioxide  24 mmol/L (22-30)   08/27/20  04:28    


 


Anion Gap  16 mmol/L  08/27/20  04:28    


 


BUN  20 mg/dL (7-17)  H  08/27/20  04:28    


 


Creatinine  0.8 mg/dL (0.6-1.2)   08/27/20  04:28    


 


Estimated GFR  > 60 ml/min  08/27/20  04:28    


 


BUN/Creatinine Ratio  25 %  08/27/20  04:28    


 


Glucose  164 mg/dL ()  H  08/27/20  04:28    


 


POC Glucose  153  ()  H  08/28/20  12:08    


 


Lactic Acid  1.30 mmol/L (0.7-2.0)   08/13/20  22:44    


 


Calcium  8.4 mg/dL (8.4-10.2)   08/27/20  04:28    


 


Ferritin  991.6 ng/mL (10.0-200.0)  H  08/22/20  09:32    


 


Total Bilirubin  0.40 mg/dL (0.1-1.2)   08/18/20  08:32    


 


Direct Bilirubin  0.3 mg/dL (0-0.2)  H  08/13/20  22:44    


 


Indirect Bilirubin  0.2 mg/dL  08/13/20  22:44    


 


AST  149 units/L (5-40)  H  08/18/20  08:32    


 


ALT  96 units/L (7-56)  H  08/18/20  08:32    


 


Alkaline Phosphatase  40 units/L ()   08/18/20  08:32    


 


Lactate Dehydrogenase  716 units/L ()  H  08/22/20  09:32    


 


Total Creatine Kinase  934 units/L ()  H  08/18/20  08:32    


 


C-Reactive Protein  0.80 mg/dL (0.00-1.30)   08/22/20  09:32    


 


Total Protein  6.1 g/dL (6.3-8.2)  L  08/18/20  08:32    


 


Albumin  2.5 g/dL (3.9-5)  L  08/18/20  08:32    


 


Albumin/Globulin Ratio  0.7 %  08/18/20  08:32    


 


Procalcitonin  0.71 ng/mL (<0.15)   08/18/20  08:32    


 


Urine Color  Tiffany  (Yellow)   08/14/20  Unknown


 


Urine Turbidity  Slightly-cloudy  (Clear)   08/14/20  Unknown


 


Urine pH  5.0  (5.0-7.0)   08/14/20  Unknown


 


Ur Specific Gravity  1.026  (1.003-1.030)   08/14/20  Unknown


 


Urine Protein  100 mg/dl mg/dL (Negative)   08/14/20  Unknown


 


Urine Glucose (UA)  Neg mg/dL (Negative)   08/14/20  Unknown


 


Urine Ketones  Tr mg/dL (Negative)   08/14/20  Unknown


 


Urine Blood  Lg  (Negative)   08/14/20  Unknown


 


Urine Nitrite  Neg  (Negative)   08/14/20  Unknown


 


Urine Bilirubin  Neg  (Negative)   08/14/20  Unknown


 


Urine Urobilinogen  2.0 mg/dL (<2.0)   08/14/20  Unknown


 


Ur Leukocyte Esterase  Neg  (Negative)   08/14/20  Unknown


 


Urine WBC (Auto)  17.0 /HPF (0.0-6.0)  H  08/14/20  Unknown


 


Urine RBC (Auto)  1.0 /HPF (0.0-6.0)   08/14/20  Unknown


 


U Epithel Cells (Auto)  4.0 /HPF (0-13.0)   08/14/20  Unknown


 


Urine Mucus  2+ /HPF  08/14/20  Unknown


 


Salicylates  < 0.3 mg/dL (2.8-20.0)  L  08/13/20  22:44    


 


Urine Opiates Screen  Presumptive negative   08/14/20  Unknown


 


Urine Methadone Screen  Presumptive negative   08/14/20  Unknown


 


Acetaminophen  5.0 ug/mL (10.0-30.0)  L  08/13/20  22:44    


 


Ur Barbiturates Screen  Presumptive negative   08/14/20  Unknown


 


Valproic Acid  31.8 ug/mL ()  L  08/17/20  08:46    


 


Ur Phencyclidine Scrn  Presumptive negative   08/14/20  Unknown


 


Ur Amphetamines Screen  Presumptive negative   08/14/20  Unknown


 


U Benzodiazepines Scrn  Presumptive negative   08/14/20  Unknown


 


Urine Cocaine Screen  Presumptive negative   08/14/20  Unknown


 


U Marijuana (THC) Screen  Presumptive negative   08/14/20  Unknown


 


Drugs of Abuse Note  Disclamer   08/14/20  Unknown


 


Plasma/Serum Alcohol  < 0.01 % (0-0.07)   08/13/20  22:44    


 


Coronavirus (PCR)  Positive  (Negative)  A  08/15/20  Unknown











Schmidt/IV: 


                                        





Voiding Method                   Incontinent


IV Catheter Type [Right Upper    Peripheral IV


arm]                             


IV Catheter Type [Right          INT / Saline Lock


Antecubital]                     


IV Catheter Type [Right Foot]    INT / Saline Lock


IV Catheter Type [Right          Peripheral IV


External Jugular]                











Active Medications





- Current Medications


Current Medications: 














Generic Name Dose Route Start Last Admin





  Trade Name Freq  PRN Reason Stop Dose Admin


 


Acetaminophen  650 mg  08/14/20 08:00  08/21/20 12:10





  Tylenol  PO   650 mg





  Q4H PRN   Administration





  Pain MILD(1-3)/Fever >100.5/HA  


 


Amlodipine Besylate  10 mg  08/24/20 19:00  08/28/20 11:36





  Amlodipine  PO   10 mg





  QDAY KATHYA   Administration


 


Lipase/Protease/Amylase  1 each  08/17/20 10:34 





  Pancreaze Dr 10,500 Unit  FEEDTUBE  





  PRN PRN  





  For Clogged Feeding Tube  


 


Benztropine Mesylate  1 mg  08/24/20 15:00  08/28/20 11:37





  Cogentin  PO   1 mg





  BID KATHYA   Administration


 


Clonidine HCl  0.2 mg  08/24/20 13:00  08/24/20 14:05





  Catapres-Tts Patch  TD   0.2 mg





  Mo KATHYA   Administration


 


Enoxaparin Sodium  40 mg  08/17/20 22:00  08/28/20 11:36





  Enoxaparin  SUB-Q   40 mg





  BID KATHYA   Administration


 


Hydralazine HCl  5 mg  08/24/20 16:44 





  Apresoline  IV  





  Q30MIN PRN  





  Hypertension  


 


Hydralazine HCl  75 mg  08/27/20 15:55  08/28/20 11:36





  Apresoline  PO   75 mg





  [facility]   Administration


 


Insulin Human Lispro  0 unit  08/25/20 22:00  08/28/20 11:37





  Humalog  SUB-Q   Not Given





  [facility]  





  Protocol  


 


Miscellaneous Medication  325 mg  08/15/20 18:45 





  Paliperidone Palmitate [Invega Sustenna]  IM  





  Q3W [facility]  


 


Olanzapine  5 mg  08/25/20 22:00  08/27/20 22:57





  Zyprexa  PO   5 mg





  QHS KATHYA   Administration


 


Ondansetron HCl  4 mg  08/14/20 08:00 





  Zofran  IV  





  Q8H PRN  





  Nausea And Vomiting  


 


Prednisone  20 mg  08/28/20 10:00  08/28/20 11:36





  Deltasone  PO  08/30/20 23:59  20 mg





  QDAY KATHYA   Administration


 


Prednisone  10 mg  08/31/20 10:00 





  Deltasone  PO  09/02/20 23:55 





  QDAY KATHYA  


 


Simple Syrup  15 ml  08/17/20 10:34 





  Simple Syrup  FEEDTUBE  





  PRN PRN  





  Hypoglycemia  


 


Simple Syrup  30 ml  08/17/20 10:34 





  Simple Syrup  FEEDTUBE  





  PRN PRN  





  Hypoglycemia  


 


Sodium Bicarbonate  325 mg  08/17/20 10:34 





  Sodium Bicarbonate  FEEDTUBE  





  PRN PRN  





  For Clogged Feeding Tube  


 


Sodium Chloride  10 ml  08/14/20 10:00  08/28/20 11:38





  Sodium Chloride Flush Syringe 10 Ml  IV   10 ml





  BID KATHYA   Administration


 


Trazodone HCl  50 mg  08/14/20 22:00  08/27/20 22:57





  Desyrel  PO   50 mg





  QHS KATHYA   Administration


 


Valproic Acid  1,500 mg  08/18/20 22:00  08/27/20 22:56





  Depakene Liq  PO   1,500 mg





  QHS KATHYA   Administration














Nutrition/Malnutrition Assess





- Dietary Evaluation


Nutrition/Malnutrition Findings: 


                                        





Nutrition Notes                                            Start:  08/17/20 

09:33


Freq:                                                      Status: Active       




Protocol:                                                                       




 Document     08/26/20 15:29  LM  (Rec: 08/26/20 15:35  LM  [id number])


 Nutrition Notes


     Initial or Follow up                        Reassessment


     Current Diagnosis                           Acute Kidney Injury,Sepsis


     Other Pertinent Diagnosis                   COVID-19 (+), Schizoaffective


                                                 disorder


     Current Diet                                Pureed


     Labs/Tests                                  POC glu 203


     Pertinent Medications                       Humalog


     Height                                      5 ft 6 in


     Weight                                      114.3 kg


     Ideal Body Weight (kg)                      59.09


     BMI                                         40.6


     Subjective/Other Information                Pt with good intakes in chart.


                                                 Pt ate 75% of breakfast today


                                                 . Pt not getting PEG.


     Percent of energy/protein needs met:        85%/83%


     Burn                                        Absent


     Trauma                                      Absent


     GI Symptoms                                 None


     Food Allergy                                No


     Current % PO                                Good (%)


     Minimum of two criteria                     No


     Reduced  Strength                       Measurably Reduced (severe)


     #1


      Nutrition Diagnosis                        Inadequate oral intake


      As Evidenced by Signs and Symptoms         pt eating 75% of pureed diet


      Diagnosis Progress(for reassessment        Improved


       documentation)                            


     Is patient on ventilator?                   No


     Is Patient Ambulatory and/or Out of Bed     No


     REE-([facility]-confined to bed)      0601.612


     Kcal/Kg value to use for calculation        14


     Approximate Energy Requirements Using       1600


      kcal/Kg                                    


     Calculation Used for Recommendations        Kcal/kg


     Additional Notes                            Pro needs 0.8-1g/kg adjBW: 69-


                                                 87g/day


                                                 Fluid needs 1ml/kcal


 Nutrition Intervention


     Change Diet Order:                          Continue current diet order as


                                                 tolerated; add consistent CHO


                                                 modifier


     Goal #1                                     Meet at least 75% of energy


                                                 and protein needs


     Anticipated Discharge Needs:                Unable to determine at this


                                                 time


     Follow-Up By:                               09/01/20


     Additional Comments                         F/U for stable intakes











<CASA ULRICH - Last Filed: 08/28/20 16:14>





Assessment and Plan


Assessment and plan: 





I saw and evaluated the patient. I agree with the findings and the plan of care 

as documented in the Nurse Practitioner's~note, with the following corrections 

and additions.


Patient clinically improving although still lethargic ideally placement in rehab

facility would be ideal but unfortunately due to insurance reasons and logistics

this could not happen.  Family member has agreed to come take the patient home. 

We will continue management.  I recommend follow-up with primary care physician 

and also psych team.





Hospitalist Physical





- Constitutional


Vitals: 


                                        











Temp Pulse Resp BP Pulse Ox


 


 98.2 F   86   18   138/84   98 


 


 08/28/20 05:00  08/28/20 11:36  08/28/20 05:00  08/28/20 05:47  08/28/20 05:47














Results





- Labs


CBC & Chem 7: 


                                 08/28/20 08:38





                                 08/27/20 04:28


Labs: 


                             Laboratory Last Values











WBC  11.1 K/mm3 (4.5-11.0)  H  08/28/20  08:38    


 


RBC  4.38 M/mm3 (3.65-5.03)   08/28/20  08:38    


 


Hgb  13.1 gm/dl (10.1-14.3)   08/28/20  08:38    


 


Hct  39.7 % (30.3-42.9)  D 08/28/20  08:38    


 


MCV  91 fl (79-97)   08/28/20  08:38    


 


MCH  30 pg (28-32)   08/28/20  08:38    


 


MCHC  33 % (30-34)   08/28/20  08:38    


 


RDW  16.0 % (13.2-15.2)  H  08/28/20  08:38    


 


Plt Count  132 K/mm3 (140-440)  L  08/28/20  08:38    


 


Lymph % (Auto)  10.6 % (13.4-35.0)  L  08/13/20  22:44    


 


Mono % (Auto)  9.8 % (0.0-7.3)  H  08/13/20  22:44    


 


Eos % (Auto)  0.0 % (0.0-4.3)   08/13/20  22:44    


 


Baso % (Auto)  1.2 % (0.0-1.8)   08/13/20  22:44    


 


Lymph #  1.5 K/mm3 (1.2-5.4)   08/13/20  22:44    


 


Mono #  1.3 K/mm3 (0.0-0.8)  H  08/13/20  22:44    


 


Eos #  0.0 K/mm3 (0.0-0.4)   08/13/20  22:44    


 


Baso #  0.2 K/mm3 (0.0-0.1)  H  08/13/20  22:44    


 


Add Manual Diff  Complete   08/21/20  04:50    


 


Total Counted  100   08/21/20  04:50    


 


Seg Neutrophils %  78.4 % (40.0-70.0)  H  08/13/20  22:44    


 


Seg Neuts % (Manual)  87.0 % (40.0-70.0)  H  08/21/20  04:50    


 


Band Neutrophils %  4.0 %  08/21/20  04:50    


 


Lymphocytes % (Manual)  2.0 % (13.4-35.0)  L  08/21/20  04:50    


 


Reactive Lymphs % (Man)  0 %  08/21/20  04:50    


 


Monocytes % (Manual)  6.0 % (0.0-7.3)   08/21/20  04:50    


 


Eosinophils % (Manual)  0 % (0.0-4.3)   08/21/20  04:50    


 


Basophils % (Manual)  0 % (0.0-1.8)   08/21/20  04:50    


 


Metamyelocytes %  1.0 %  08/21/20  04:50    


 


Myelocytes %  0 %  08/21/20  04:50    


 


Promyelocytes %  0 %  08/21/20  04:50    


 


Blast Cells %  0 %  08/21/20  04:50    


 


Nucleated RBC %  1.0 % (0.0-0.9)  H  08/21/20  04:50    


 


Seg Neutrophils #  10.8 K/mm3 (1.8-7.7)  H  08/13/20  22:44    


 


Seg Neutrophils # Man  14.4 K/mm3 (1.8-7.7)  H  08/21/20  04:50    


 


Band Neutrophils #  0.7 K/mm3  08/21/20  04:50    


 


Lymphocytes # (Manual)  0.3 K/mm3 (1.2-5.4)  L  08/21/20  04:50    


 


Abs React Lymphs (Man)  0.0 K/mm3  08/21/20  04:50    


 


Monocytes # (Manual)  1.0 K/mm3 (0.0-0.8)  H  08/21/20  04:50    


 


Eosinophils # (Manual)  0.0 K/mm3 (0.0-0.4)   08/21/20  04:50    


 


Basophils # (Manual)  0.0 K/mm3 (0.0-0.1)   08/21/20  04:50    


 


Metamyelocytes #  0.2 K/mm3  08/21/20  04:50    


 


Myelocytes #  0.0 K/mm3  08/21/20  04:50    


 


Promyelocytes #  0.0 K/mm3  08/21/20  04:50    


 


Blast Cells #  0.0 K/mm3  08/21/20  04:50    


 


WBC Morphology  Not Reportable   08/21/20  04:50    


 


Hypersegmented Neuts  Not Reportable   08/21/20  04:50    


 


Hyposegmented Neuts  Not Reportable   08/21/20  04:50    


 


Hypogranular Neuts  Not Reportable   08/21/20  04:50    


 


Smudge Cells  Not Reportable   08/21/20  04:50    


 


Toxic Granulation  Not Reportable   08/21/20  04:50    


 


Toxic Vacuolation  Not Reportable   08/21/20  04:50    


 


Dohle Bodies  Not Reportable   08/21/20  04:50    


 


Pelger-Huet Anomaly  Not Reportable   08/21/20  04:50    


 


Arnie Rods  Not Reportable   08/21/20  04:50    


 


Platelet Estimate  Consistent w auto   08/21/20  04:50    


 


Clumped Platelets  Not Reportable   08/21/20  04:50    


 


Plt Clumps, EDTA  Not Reportable   08/21/20  04:50    


 


Large Platelets  Not Reportable   08/21/20  04:50    


 


Giant Platelets  Not Reportable   08/21/20  04:50    


 


Platelet Satelliting  Not Reportable   08/21/20  04:50    


 


Plt Morphology Comment  Not Reportable   08/21/20  04:50    


 


RBC Morphology  Not Reportable   08/21/20  04:50    


 


Dimorphic RBCs  Not Reportable   08/21/20  04:50    


 


Polychromasia  Not Reportable   08/21/20  04:50    


 


Hypochromasia  Not Reportable   08/21/20  04:50    


 


Poikilocytosis  Not Reportable   08/21/20  04:50    


 


Anisocytosis  Not Reportable   08/21/20  04:50    


 


Microcytosis  Not Reportable   08/21/20  04:50    


 


Macrocytosis  1+   08/21/20  04:50    


 


Spherocytes  Not Reportable   08/21/20  04:50    


 


Pappenheimer Bodies  Not Reportable   08/21/20  04:50    


 


Sickle Cells  Not Reportable   08/21/20  04:50    


 


Target Cells  1+   08/21/20  04:50    


 


Tear Drop Cells  Not Reportable   08/21/20  04:50    


 


Ovalocytes  Not Reportable   08/21/20  04:50    


 


Helmet Cells  Not Reportable   08/21/20  04:50    


 


Mancera-[place] Bodies  Not Reportable   08/21/20  04:50    


 


Cabot Rings  Not Reportable   08/21/20  04:50    


 


[place] Cells  Not Reportable   08/21/20  04:50    


 


Bite Cells  Not Reportable   08/21/20  04:50    


 


Crenated Cell  Not Reportable   08/21/20  04:50    


 


Elliptocytes  Not Reportable   08/21/20  04:50    


 


Acanthocytes (Spur)  Not Reportable   08/21/20  04:50    


 


Rouleaux  Not Reportable   08/21/20  04:50    


 


Hemoglobin C Crystals  Not Reportable   08/21/20  04:50    


 


Schistocytes  Not Reportable   08/21/20  04:50    


 


Malaria parasites  Not Reportable   08/21/20  04:50    


 


[place] Bodies  Not Reportable   08/21/20  04:50    


 


Hem Pathologist Commnt  No   08/21/20  04:50    


 


PT  14.9 Sec. (12.2-14.9)   08/20/20  15:23    


 


INR  1.15  (0.87-1.13)  H  08/20/20  15:23    


 


APTT  29.5 Sec. (24.2-36.6)   08/20/20  15:23    


 


D-Dimer  154.77 ng/mlDDU (0-234)   08/28/20  08:38    


 


Sodium  141 mmol/L (137-145)   08/27/20  04:28    


 


Potassium  4.0 mmol/L (3.6-5.0)   08/27/20  04:28    


 


Chloride  104.8 mmol/L ()   08/27/20  04:28    


 


Carbon Dioxide  24 mmol/L (22-30)   08/27/20  04:28    


 


Anion Gap  16 mmol/L  08/27/20  04:28    


 


BUN  20 mg/dL (7-17)  H  08/27/20  04:28    


 


Creatinine  0.8 mg/dL (0.6-1.2)   08/27/20  04:28    


 


Estimated GFR  > 60 ml/min  08/27/20  04:28    


 


BUN/Creatinine Ratio  25 %  08/27/20  04:28    


 


Glucose  164 mg/dL ()  H  08/27/20  04:28    


 


POC Glucose  153  ()  H  08/28/20  12:08    


 


Lactic Acid  1.30 mmol/L (0.7-2.0)   08/13/20  22:44    


 


Calcium  8.4 mg/dL (8.4-10.2)   08/27/20  04:28    


 


Ferritin  991.6 ng/mL (10.0-200.0)  H  08/22/20  09:32    


 


Total Bilirubin  0.40 mg/dL (0.1-1.2)   08/18/20  08:32    


 


Direct Bilirubin  0.3 mg/dL (0-0.2)  H  08/13/20  22:44    


 


Indirect Bilirubin  0.2 mg/dL  08/13/20  22:44    


 


AST  149 units/L (5-40)  H  08/18/20  08:32    


 


ALT  96 units/L (7-56)  H  08/18/20  08:32    


 


Alkaline Phosphatase  40 units/L ()   08/18/20  08:32    


 


Lactate Dehydrogenase  716 units/L ()  H  08/22/20  09:32    


 


Total Creatine Kinase  934 units/L ()  H  08/18/20  08:32    


 


C-Reactive Protein  0.80 mg/dL (0.00-1.30)   08/22/20  09:32    


 


Total Protein  6.1 g/dL (6.3-8.2)  L  08/18/20  08:32    


 


Albumin  2.5 g/dL (3.9-5)  L  08/18/20  08:32    


 


Albumin/Globulin Ratio  0.7 %  08/18/20  08:32    


 


Procalcitonin  0.71 ng/mL (<0.15)   08/18/20  08:32    


 


Urine Color  Tiffany  (Yellow)   08/14/20  Unknown


 


Urine Turbidity  Slightly-cloudy  (Clear)   08/14/20  Unknown


 


Urine pH  5.0  (5.0-7.0)   08/14/20  Unknown


 


Ur Specific Gravity  1.026  (1.003-1.030)   08/14/20  Unknown


 


Urine Protein  100 mg/dl mg/dL (Negative)   08/14/20  Unknown


 


Urine Glucose (UA)  Neg mg/dL (Negative)   08/14/20  Unknown


 


Urine Ketones  Tr mg/dL (Negative)   08/14/20  Unknown


 


Urine Blood  Lg  (Negative)   08/14/20  Unknown


 


Urine Nitrite  Neg  (Negative)   08/14/20  Unknown


 


Urine Bilirubin  Neg  (Negative)   08/14/20  Unknown


 


Urine Urobilinogen  2.0 mg/dL (<2.0)   08/14/20  Unknown


 


Ur Leukocyte Esterase  Neg  (Negative)   08/14/20  Unknown


 


Urine WBC (Auto)  17.0 /HPF (0.0-6.0)  H  08/14/20  Unknown


 


Urine RBC (Auto)  1.0 /HPF (0.0-6.0)   08/14/20  Unknown


 


U Epithel Cells (Auto)  4.0 /HPF (0-13.0)   08/14/20  Unknown


 


Urine Mucus  2+ /HPF  08/14/20  Unknown


 


Salicylates  < 0.3 mg/dL (2.8-20.0)  L  08/13/20  22:44    


 


Urine Opiates Screen  Presumptive negative   08/14/20  Unknown


 


Urine Methadone Screen  Presumptive negative   08/14/20  Unknown


 


Acetaminophen  5.0 ug/mL (10.0-30.0)  L  08/13/20  22:44    


 


Ur Barbiturates Screen  Presumptive negative   08/14/20  Unknown


 


Valproic Acid  31.8 ug/mL ()  L  08/17/20  08:46    


 


Ur Phencyclidine Scrn  Presumptive negative   08/14/20  Unknown


 


Ur Amphetamines Screen  Presumptive negative   08/14/20  Unknown


 


U Benzodiazepines Scrn  Presumptive negative   08/14/20  Unknown


 


Urine Cocaine Screen  Presumptive negative   08/14/20  Unknown


 


U Marijuana (THC) Screen  Presumptive negative   08/14/20  Unknown


 


Drugs of Abuse Note  Disclamer   08/14/20  Unknown


 


Plasma/Serum Alcohol  < 0.01 % (0-0.07)   08/13/20  22:44    


 


Coronavirus (PCR)  Positive  (Negative)  A  08/15/20  Unknown











Schmidt/IV: 


                                        





Voiding Method                   Incontinent


IV Catheter Type [Right Upper    Peripheral IV


arm]                             


IV Catheter Type [Right          INT / Saline Lock


Antecubital]                     


IV Catheter Type [Right Foot]    INT / Saline Lock


IV Catheter Type [Right          Peripheral IV


External Jugular]                











Active Medications





- Current Medications


Current Medications: 














Generic Name Dose Route Start Last Admin





  Trade Name Freq  PRN Reason Stop Dose Admin


 


Acetaminophen  650 mg  08/14/20 08:00  08/21/20 12:10





  Tylenol  PO   650 mg





  Q4H PRN   Administration





  Pain MILD(1-3)/Fever >100.5/HA  


 


Amlodipine Besylate  10 mg  08/24/20 19:00  08/28/20 11:36





  Amlodipine  PO   10 mg





  QDAY KATHYA   Administration


 


Lipase/Protease/Amylase  1 each  08/17/20 10:34 





  Pancreaze Dr 10,500 Unit  FEEDTUBE  





  PRN PRN  





  For Clogged Feeding Tube  


 


Benztropine Mesylate  1 mg  08/24/20 15:00  08/28/20 11:37





  Cogentin  PO   1 mg





  BID KATHYA   Administration


 


Clonidine HCl  0.2 mg  08/24/20 13:00  08/24/20 14:05





  Catapres-Tts Patch  TD   0.2 mg





  Mo KATHYA   Administration


 


Enoxaparin Sodium  40 mg  08/17/20 22:00  08/28/20 11:36





  Enoxaparin  SUB-Q   40 mg





  BID KATHYA   Administration


 


Hydralazine HCl  5 mg  08/24/20 16:44 





  Apresoline  IV  





  Q30MIN PRN  





  Hypertension  


 


Hydralazine HCl  75 mg  08/27/20 15:55  08/28/20 11:36





  Apresoline  PO   75 mg





  TID KATHYA   Administration


 


Insulin Human Lispro  0 unit  08/25/20 22:00  08/28/20 11:37





  Humalog  SUB-Q   Not Given





  [facility]  





  Protocol  


 


Miscellaneous Medication  325 mg  08/15/20 18:45 





  Paliperidone Palmitate [Invega Sustenna]  IM  





  Q3W KATHYA  


 


Olanzapine  5 mg  08/25/20 22:00  08/27/20 22:57





  Zyprexa  PO   5 mg





  QHS KATHYA   Administration


 


Ondansetron HCl  4 mg  08/14/20 08:00 





  Zofran  IV  





  Q8H PRN  





  Nausea And Vomiting  


 


Prednisone  20 mg  08/28/20 10:00  08/28/20 11:36





  Deltasone  PO  08/30/20 23:59  20 mg





  QDAY KATHYA   Administration


 


Prednisone  10 mg  08/31/20 10:00 





  Deltasone  PO  09/02/20 23:55 





  QDAY KATHYA  


 


Simple Syrup  15 ml  08/17/20 10:34 





  Simple Syrup  FEEDTUBE  





  PRN PRN  





  Hypoglycemia  


 


Simple Syrup  30 ml  08/17/20 10:34 





  Simple Syrup  FEEDTUBE  





  PRN PRN  





  Hypoglycemia  


 


Sodium Bicarbonate  325 mg  08/17/20 10:34 





  Sodium Bicarbonate  FEEDTUBE  





  PRN PRN  





  For Clogged Feeding Tube  


 


Sodium Chloride  10 ml  08/14/20 10:00  08/28/20 11:38





  Sodium Chloride Flush Syringe 10 Ml  IV   10 ml





  BID KATHYA   Administration


 


Trazodone HCl  50 mg  08/14/20 22:00  08/27/20 22:57





  Desyrel  PO   50 mg





  QHS KATHYA   Administration


 


Valproic Acid  1,500 mg  08/18/20 22:00  08/27/20 22:56





  Depakene Liq  PO   1,500 mg





  QHS KATHYA   Administration














Nutrition/Malnutrition Assess





- Dietary Evaluation


Nutrition/Malnutrition Findings: 


                                        





Nutrition Notes                                            Start:  08/17/20 

09:33


Freq:                                                      Status: Active       




Protocol:                                                                       




 Document     08/26/20 15:29  LM  (Rec: 08/26/20 15:35  LM  [id number])


 Nutrition Notes


     Initial or Follow up                        Reassessment


     Current Diagnosis                           Acute Kidney Injury,Sepsis


     Other Pertinent Diagnosis                   COVID-19 (+), Schizoaffective


                                                 disorder


     Current Diet                                Pureed


     Labs/Tests                                  POC glu 203


     Pertinent Medications                       Humalog


     Height                                      5 ft 6 in


     Weight                                      114.3 kg


     Ideal Body Weight (kg)                      59.09


     BMI                                         40.6


     Subjective/Other Information                Pt with good intakes in chart.


                                                 Pt ate 75% of breakfast today


                                                 . Pt not getting PEG.


     Percent of energy/protein needs met:        85%/83%


     Burn                                        Absent


     Trauma                                      Absent


     GI Symptoms                                 None


     Food Allergy                                No


     Current % PO                                Good (%)


     Minimum of two criteria                     No


     Reduced  Strength                       Measurably Reduced (severe)


     #1


      Nutrition Diagnosis                        Inadequate oral intake


      As Evidenced by Signs and Symptoms         pt eating 75% of pureed diet


      Diagnosis Progress(for reassessment        Improved


       documentation)                            


     Is patient on ventilator?                   No


     Is Patient Ambulatory and/or Out of Bed     No


     REE-([facility]-confined to bed)      9070.612


     Kcal/Kg value to use for calculation        14


     Approximate Energy Requirements Using       1600


      kcal/Kg                                    


     Calculation Used for Recommendations        Kcal/kg


     Additional Notes                            Pro needs 0.8-1g/kg adjBW: 69-


                                                 87g/day


                                                 Fluid needs 1ml/kcal


 Nutrition Intervention


     Change Diet Order:                          Continue current diet order as


                                                 tolerated; add consistent CHO


                                                 modifier


     Goal #1                                     Meet at least 75% of energy


                                                 and protein needs


     Anticipated Discharge Needs:                Unable to determine at this


                                                 time


     Follow-Up By:                               09/01/20


     Additional Comments                         F/U for stable intakes

## 2020-08-28 NOTE — PROGRESS NOTE
Assessment and Plan


Cultures:


Blood culture and urine culture negative


COVID PCR positive


 


Assessment: 55 years old female with history of paranoid schizophrenia, admitted

on 8/13/2020 due to altered mental status and catatonia state:





#Severe sepsis: Present on admission with low-grade fever, tachycardia, elevated

leukocytosis, WHITNEY, likely due to bilateral pneumonia. Resolved.





#Bilateral pneumonia: COVID-19 pneumonia.  Inflammatory markers are elevated, 

with a d-dimer > 10,000.  CT negative for pulmonary embolism. Repeat d-dimer 

with much improvement. Renal function and LFTs improving, but given mild hypoxia

and several days of positivity, unclear benefit with Remdesivir at this stage. 

Completed empiric CAP abx x 5 days. Completed steroids. 





#Acute hypoxemic respiratory failure: resolved, on room air.





#Elevated LFTs: from COVID/rhabdo





#WHITNEY: from COVID. improved





#Elevated CK: Likely secondary to rhabdomyolysis possible due to COVID-19 

infection or catatonic state





#Paranoid schizophrenia with catatonia: Per psych





Recommendations:


Overall she remains stable on room air for the past several days


check d-dimer every 2-3 days due to increased risk of VTE in patients with 

COVID-19





ID will sign off. Please call if questions.





Blank Coppola MD, FACP


[facility] ([facility])


C: [phone]


O: [phone]


F: [phone]





Subjective


Date of service: 08/28/20


Principal diagnosis: Neurogenic Dysphagia


Interval history: 


No fever.  Remains stable on room air.  





Objective





- Exam


Narrative Exam: 


Physical Exam (reviewed in chart due to PPE conservation)


Constitutional: limited due to PPE conservation strategy


Head, Ears, Nose: limited due to PPE conservation strategy


Eyes: limited due to PPE conservation strategy


Neck: limited due to PPE conservation strategy


Oral: limited due to PPE conservation strategy


Cardiovascular: limited due to PPE conservation strategy


Respiratory: limited due to PPE conservation strategy


GI: limited due to PPE conservation strategy


Musculoskeletal: limited due to PPE conservation strategy


Skin: limited due to PPE conservation strategy


Hem/Lymphatic: limited due to PPE conservation strategy


Psych: limited due to PPE conservation strategy


Neurological: limited due to PPE conservation strategy          





- Constitutional


Vitals: 


                                   Vital Signs











Temp Pulse Resp BP Pulse Ox


 


 98.2 F   86   18   138/84   98 


 


 08/28/20 05:00  08/28/20 11:36  08/28/20 05:00  08/28/20 05:47  08/28/20 05:47








                           Temperature -Last 24 Hours











Temperature                    98.2 F


 


Temperature                    98.3 F

















- Labs


CBC & Chem 7: 


                                 08/28/20 08:38





                                 08/27/20 04:28


Labs: 


                              Abnormal lab results











  08/27/20 08/27/20 08/28/20 Range/Units





  17:40 22:45 07:56 


 


WBC     (4.5-11.0)  K/mm3


 


RDW     (13.2-15.2)  %


 


Plt Count     (140-440)  K/mm3


 


POC Glucose  185 H  140 H  107 H  ()  














  08/28/20 08/28/20 Range/Units





  08:38 12:08 


 


WBC  11.1 H   (4.5-11.0)  K/mm3


 


RDW  16.0 H   (13.2-15.2)  %


 


Plt Count  132 L   (140-440)  K/mm3


 


POC Glucose   153 H  ()

## 2020-08-28 NOTE — DISCHARGE SUMMARY
<PADMA AMES - Last Filed: 08/28/20 14:57>





Providers





- Providers


Date of Admission: 


08/14/20 15:51





Attending physician: 


CASA ULRICH MD





                                        





08/14/20 07:59


Consult to Mental Health [CONS] Urgent 


   Reason For Exam: Catatonic state w/ psych history





08/14/20 08:16


Consult to Physician [CONS] Routine 


   Comment: 


   Consulting Provider: JUANA CAMPOS


   Physician Instructions: 


   Reason For Exam: Covid PUI





08/14/20 09:30


Consult to Case Management [CONS] Routine 


   Services Needed at Discharge: 


   Notified:: yes


   Phone number called:: in person


   Was contact made?: No


   Time called:: 13:30





08/14/20 11:33


Consult to Physician [CONS] Routine 


   Comment: 


   Consulting Provider: VIBHA REYNOLDS


   Physician Instructions: 


   Reason For Exam: NEUROLEPTIC MALIGNANT SYNDROME





08/16/20 14:51


Consult to Dietitian/Nutrition [CONS] Routine 


   Physician Instructions: 


   Reason For Exam: 


   Reason for Consult: Write/Manage Tube Feeding





08/16/20 14:52


Consult to Dietitian/Nutrition [CONS] Routine 


   Physician Instructions: 


   Reason For Exam: 


   Reason for Consult: Write/Manage Tube Feeding





08/18/20 11:47


Physical Therapy Evaluation and Treat [CONS] Routine 


   Comment: 


   Reason For Exam: placement


Speech Therapy Evaluation and Treat [CONS] Routine 


   Reason For Exam: aspiration





08/20/20 12:24


Consult to Physician [CONS] Routine 


   Comment: 


   Consulting Provider: [facility]


   Physician Instructions: 


   Reason For Exam: PEG placement





08/20/20 12:26


Consult to Physician [CONS] Routine 


   Comment: 


   Consulting Provider: TL TURNER


   Physician Instructions: 


   Reason For Exam: PEG placement





08/25/20 09:38


Physical Therapy Evaluation and Treat [CONS] Urgent 


   Comment: 


   Reason For Exam: To assist with transitional planning


   Date of last referral: 08/19/20











Primary care physician: 


PRIMARY CARE MD








Hospitalization


Condition: Stable


Hospital course: 


This is a 55-year-old female with paranoid schizophrenia that presents to the 

emergency department on 8/13 for altered mental status in a catatonic state.  

Previous hospitalizations reviewed and only notes a history of paranoid 

schizophrenia. Patient may be a resident of [place].  Patient is currently 

nonverbal and HPI is received from ER documentation.  Work-up in the emergency 

department included a CT abdomen pelvis which shows patchy peripheral 

groundglass opacities bilateral lungs which is consistent with either viral or 

atypical pneumonia, positive anterior lateral fat hernia in the left mid abdomen

 and a large uterine fibroids however her CXR showed no acute pulmonary or 

pleural abnormalities.  She was found to have acute kidney injury with a 

creatinine of 1.4/BUN 33 as baseline from previous records seems to be 0.7, 

rhabdomyolysis with a creatinine kinase of 33,481, leukocytosis with a WBC of 

13.8, thrombocytopenia with platelets at 97.  She has COVID pneumonia and was 

started on IV antibiotics, rhabdomyolysis, and WHITNEY. Infectious disease, psych, 

and neurology have been consulted. Neurology does not suspect NMS at this time. 

Psych does not recommend inpt acute hospitalization at this time.  On 8/16 CTA 

chest with obtained for elevated d-dimer and CTh head was obtained for altered 

mental status both of which showed no acute process. On 8/28 GI was consulted 

for possible PEG placement due to patient unresponsiveness to verbal stimuli 

however she was later started on a pured diet on 8/24.  On 8/25 psych was 

reconsulted for pill rolling EPS symptoms some adjustments were made to her 

medication.  Physical therapy and speech therapy were consulted for evaluation 

but unable to be performed as patient will not follow verbal commands. Patient 

remains nonverbal and only groans/moans to painful stimuli.  She has completed 

her antibiotics for COVID pneumonia and her rhabdomyolysis, WHITNEY secondary to 

rhabdomyolysis and vasomotor nephropathy, and thrombocytopenia has resolved. She

 will be discharged with her caregiver with HHPT, on a steroid taper, and 7 days

 of eliquis.  Please follow-up with your primary care physician and outpatient 

psychiatric services within 1 to 2 weeks of discharge. 





(1) Severe sepsis


Current Visit: Yes   Status: Resolved   


Plan to address problem: 


-  Presented with low-grade fever, tachycardia, leukocytosis, WHITNEY, and bilateral

 pneumonia


- Likely source is bilateral pneumonia secondary to COVID-19


- IV antibiotics completed





(2) Pneumonia due to COVID-19 virus


Current Visit: Yes   Status: Acute   


Plan to address problem: 


- IV azithromycin and Rocephin completed


- 8/14 COVID PCR positive


- IV solumedrel changed to PO prednisone and started on taper, be discharged on 

taper





(3) Paranoid schizophrenia


Current Visit: Yes   Status: Chronic   


Plan to address problem: 


- History of present schizophrenia


- [facility] psych does not recommend inpatient hospitalization at this time


- Depakote and Haldol 





(4) Leukocytosis


Current Visit: Yes   Status: Acute   


Plan to address problem: 


- Admit WBC 13.8


- Maybe secondary to possible PNA or from dehydration


- IV abx completed


- Also on steroids for COVID pneumonia


- Now trending down





(5) Hypertension


Current Visit: Yes   Status: Chronic   


Plan to address problem: 


- Clonidine TD and hydralazine PO


- Blood pressure monitoring as prescribed per primary care physician





Disposition: [id number]/[place]-06 HOME UNDER HOME [facility]


Time spent for discharge: 35





Core Measure Documentation





- Palliative Care


Palliative Care/ Comfort Measures: Not Applicable





- Core Measures


Any of the following diagnoses?: none





Exam





- Constitutional


Vitals: 


                                        











Temp Pulse Resp BP Pulse Ox


 


 98.2 F   86   18   138/84   98 


 


 08/28/20 05:00  08/28/20 11:36  08/28/20 05:00  08/28/20 05:47  08/28/20 05:47











General appearance: Present: no acute distress





- EENT


Eyes: Present: PERRL


ENT: hearing intact





- Neck


Neck: Present: supple





- Respiratory


Respiratory effort: normal


Respiratory: bilateral: diminished (2/2 girth)





- Cardiovascular


Rhythm: regular


Heart Sounds: Present: S1 & S2.  Absent: systolic murmur, diastolic murmur





- Extremities


Extremities: no ischemia, pulses intact, pulses symmetrical, No edema, normal 

temperature, normal color, Full ROM


Peripheral Pulses: within normal limits





- Abdominal


General gastrointestinal: Present: non-tender, non-distended, normal bowel 

sounds





- Integumentary


Integumentary: Present: clear, warm, dry





- Musculoskeletal


Musculoskeletal: strength equal bilaterally





- Psychiatric


Psychiatric: cooperative





- Neurologic


Neurologic: no focal deficits, moves all extremities





- Allied Health


Allied health notes reviewed: nursing, social work, case management





Plan


Activity: advance as tolerated


Diet: low salt


Special Instructions: record daily BP diary, other (HHPT)


Additional Instructions: Reports urinary emergency department to contact her 

primary care physician if you have worsening symptoms.  Please follow the COVID 

guidelines and the information booklet to be provided by your nurse.  Follow-up 

with your primary care physician and secure outpatient psychiatric services 

within 1 to 2 weeks of discharge.  You will be discharged with a steroid taper, 

Eliquis and with home health physical therapy.


Follow up with: 


PRIMARY CARE,MD [Primary Care Provider] - 7 Days


AGUSTÍN STUART MD [Staff Physician] - 7 Days


Prescriptions: 


VALPROIC ACID Liq [DepaKENE Liq] 1,500 mg PO QHS #30 oral.liqd


traZODone [Desyrel] 50 mg PO QHS #30 tablet


OLANzapine [ZyPREXA] 5 mg PO QHS #30 tablet


amLODIPine 10 mg PO QDAY #30 tablet


hydrALAZINE [Apresoline TAB] 75 mg PO TID 30 Days #270 tablet


cloNIDine-TTS PATCH [Catapres-Tts 0.2mg Patch] 0.2 mg TD Mo #12 patch


Benztropine [Cogentin] 1 mg PO BID #60 tablet


predniSONE [Deltasone] 10 mg PO QDAY 3 Days #3 tablet


predniSONE [Deltasone] 20 mg PO QDAY 2 Days #4 tablet


Apixaban [Eliquis] 2.5 mg PO BID #60 tablet


Paliperidone Palmitate [Invega Sustenna] 325 mg IM Q3W #2 syr





<CASA ULRICH - Last Filed: 08/28/20 16:14>





Providers





- Providers


Date of Admission: 


08/14/20 15:51





Attending physician: 


CASA ULRICH MD





                                        





08/14/20 07:59


Consult to Mental Health [CONS] Urgent 


   Reason For Exam: Catatonic state w/ psych history





08/14/20 08:16


Consult to Physician [CONS] Routine 


   Comment: 


   Consulting Provider: JUANA CAMPOS


   Physician Instructions: 


   Reason For Exam: Covid PUI





08/14/20 09:30


Consult to Case Management [CONS] Routine 


   Services Needed at Discharge: 


   Notified:: yes


   Phone number called:: in person


   Was contact made?: No


   Time called:: 13:30





08/14/20 11:33


Consult to Physician [CONS] Routine 


   Comment: 


   Consulting Provider: VIBHA REYNOLDS


   Physician Instructions: 


   Reason For Exam: NEUROLEPTIC MALIGNANT SYNDROME





08/16/20 14:51


Consult to Dietitian/Nutrition [CONS] Routine 


   Physician Instructions: 


   Reason For Exam: 


   Reason for Consult: Write/Manage Tube Feeding





08/16/20 14:52


Consult to Dietitian/Nutrition [CONS] Routine 


   Physician Instructions: 


   Reason For Exam: 


   Reason for Consult: Write/Manage Tube Feeding





08/18/20 11:47


Physical Therapy Evaluation and Treat [CONS] Routine 


   Comment: 


   Reason For Exam: placement


Speech Therapy Evaluation and Treat [CONS] Routine 


   Reason For Exam: aspiration





08/20/20 12:24


Consult to Physician [CONS] Routine 


   Comment: 


   Consulting Provider: [facility]


   Physician Instructions: 


   Reason For Exam: PEG placement





08/20/20 12:26


Consult to Physician [CONS] Routine 


   Comment: 


   Consulting Provider: TL TURNER


   Physician Instructions: 


   Reason For Exam: PEG placement





08/25/20 09:38


Physical Therapy Evaluation and Treat [CONS] Urgent 


   Comment: 


   Reason For Exam: To assist with transitional planning


   Date of last referral: 08/19/20











Primary care physician: 


PRIMARY CARE MD








Exam





- Constitutional


Vitals: 


                                        











Temp Pulse Resp BP Pulse Ox


 


 98.2 F   86   18   138/84   98 


 


 08/28/20 05:00  08/28/20 11:36  08/28/20 05:00  08/28/20 05:47  08/28/20 05:47

## 2020-08-29 LAB
HCT VFR BLD CALC: 44.5 % (ref 30.3–42.9)
HGB BLD-MCNC: 14.9 GM/DL (ref 10.1–14.3)
MCHC RBC AUTO-ENTMCNC: 34 % (ref 30–34)
MCV RBC AUTO: 91 FL (ref 79–97)
PLATELET # BLD: 98 K/MM3 (ref 140–440)
RBC # BLD AUTO: 4.87 M/MM3 (ref 3.65–5.03)

## 2020-08-29 RX ADMIN — ENOXAPARIN SODIUM SCH MG: 100 INJECTION SUBCUTANEOUS at 09:00

## 2020-08-29 RX ADMIN — Medication SCH: at 20:40

## 2020-08-29 RX ADMIN — Medication SCH ML: at 21:41

## 2020-08-29 RX ADMIN — BENZTROPINE MESYLATE SCH MG: 1 TABLET ORAL at 09:00

## 2020-08-29 RX ADMIN — INSULIN LISPRO SCH: 100 INJECTION, SOLUTION INTRAVENOUS; SUBCUTANEOUS at 08:03

## 2020-08-29 RX ADMIN — INSULIN LISPRO SCH UNIT: 100 INJECTION, SOLUTION INTRAVENOUS; SUBCUTANEOUS at 16:30

## 2020-08-29 RX ADMIN — BENZTROPINE MESYLATE SCH MG: 1 TABLET ORAL at 21:41

## 2020-08-29 RX ADMIN — VALPROIC ACID SCH MG: 250 SOLUTION ORAL at 21:37

## 2020-08-29 RX ADMIN — ENOXAPARIN SODIUM SCH MG: 100 INJECTION SUBCUTANEOUS at 21:37

## 2020-08-29 RX ADMIN — INSULIN LISPRO SCH UNIT: 100 INJECTION, SOLUTION INTRAVENOUS; SUBCUTANEOUS at 13:21

## 2020-08-29 RX ADMIN — INSULIN LISPRO SCH: 100 INJECTION, SOLUTION INTRAVENOUS; SUBCUTANEOUS at 22:00

## 2020-08-29 NOTE — PROGRESS NOTE
Assessment and Plan


Assessment and plan: 


This is a 55-year-old female with paranoid schizophrenia that presents to the 

emergency department on 8/13 for altered mental status in a catatonic state.  

Previous hospitalizations reviewed and only notes a history of paranoid 

schizophrenia. Patient may be a resident of [place].  Patient is currently 

nonverbal and HPI is received from ER documentation.  Work-up in the emergency 

department included a CT abdomen pelvis which shows patchy peripheral 

groundglass opacities bilateral lungs which is consistent with either viral or 

atypical pneumonia, positive anterior lateral fat hernia in the left mid abdomen

and a large uterine fibroids however her CXR showed no acute pulmonary or 

pleural abnormalities.  She was found to have acute kidney injury with a 

creatinine of 1.4/BUN 33 as baseline from previous records seems to be 0.7, 

rhabdomyolysis with a creatinine kinase of 33,481, leukocytosis with a WBC of 

13.8, thrombocytopenia with platelets at 97.  She has COVID pneumonia with IV 

antibiotics, rhabdomyolysis, and WHITNEY. Infectious disease, psych, and neurology 

have been consulted. Neurology does not suspect NMS at this time. Psych has 

signed off at this time due to ongoing acute medical processes.  PT/ST consulted

for evaluation but unable to be performed as patient will not follow verbal 

commands.  She will open her eyes to verbal stimuli and intermittently follow 

commands.  Patient remains nonverbal and only groans/moans to painful stimuli. 

Currently on RA. Her caregiver has agreed to bring pt home with HHPT. 





* 8/27: continue care, pending discharge details w/ CM and caregiver


* 8/26: continue supportive care, awaiting placement with HHPT


* 8/25: Continue care, psych reconsulted but signed off with some medication 

  adjustments


* 8/24: pureed diet


* 8/21: continue care


* 8/20: GI consult for peg


* 8/19: continue care


* Discussed with primary caretaker patient was normally verbal but sometimes 

  goes into a catatonic state.  Phone number for this is [phone]


* 8/18: PT consulted.  Psych signed off.  Continue treatment


* 8/17: continue treatment


* 8/16: Change fluids to NS 1/2 AT 125CC/hr. Rhabdomylysis improving, insert NGT

  and start tube feeds. Psych input noted. Continue management for COVID 19. ID 

  consulted. No new fever.  CTA chest and CTH obtained which were both negative


* 8/15: COVID PCR Positive


8/29: Patient remains in the hospital as hospital bed is being arranged for the 

patient awaiting placement at this time.  Discharge was planned for yesterday 

this is held up due to bed condition.  Discussed with nursing staff to continue 

passive range of motion with the patient sitting up out of bed to chair and fall

precautions.





Patient Problems


(1) Pneumonia due to COVID-19 virus


Current Visit: Yes   Status: Acute   


Plan to address problem: 


- COVID protocol initiated


- IV azithromycin and Rocephin completed


- 8/14 COVID PCR positive


- Trend LDH, CRP, procalcitonin, d-dimer, ferritin 


- Droplet /contact precautions


- Supplemental oxygenation as needed


- Infectious disease consulted


- Pulmonary hygiene


- In setting of elevated D-Dimer, CTA Chest was ordered which was negative for 

pulmonary embolism


- Anticoagulation per COVID protocol, will DC with Eliquis 2.5 BID PO for 30 

days


- Cannot initiate Remdesivir in setting of elevated LFTs


- IV solumedrel changed to PO prednisone and started on taper








(2) Hypertension


Current Visit: Yes   Status: Chronic   


Plan to address problem: 


- BP monitoring per protocol


- PRN hydralizine


- Clonidine TD and hydralazine PO








(3) Paranoid schizophrenia


Current Visit: Yes   Status: Chronic   


Plan to address problem: 


-History of present schizophrenia


-8/24 Psych reconsulted


-Depakote and Haldol 


-Sleep hygiene


-Reorientation as needed








(4) Severe sepsis


Current Visit: Yes   Status: Resolved   


Plan to address problem: 


- Presented with low-grade fever, tachycardia, leukocytosis, WHITNEY, and bilateral 

pneumonia


- Likely source is bilateral pneumonia secondary to COVID-19


- IV antibiotics completed


- Covid protocol initiated











(5) DVT prophylaxis


Current Visit: Yes   Status: Acute   


Plan to address problem: 


- SCDs to BLE while in bed


- Lovenox subq twice daily per COVID protocol








(6) Discharge planning issues


Current Visit: Yes   Status: Acute   








History


Interval history: 


Patient seen and examined clinically improving answer some questions.  Laying in

bed





Hospitalist Physical





- Physical exam


Narrative exam: 


 


General appearance: Present: no acute distress, obese





- EENT


Eyes: Present: PERRL





- Neck


Neck: Present: normal ROM





- Respiratory


Respiratory effort: normal


Respiratory: bilateral: diminished (2/2 girth)





- Cardiovascular


Rhythm: regular


Heart Sounds: Present: S1 & S2.  Absent: systolic murmur, diastolic murmur





- Extremities


Extremities: no ischemia, pulses intact, pulses symmetrical, No edema, normal 

temperature, normal color, Full ROM


Peripheral Pulses: within normal limits





- Abdominal


General gastrointestinal: soft, non-tender, normal bowel sounds





- Integumentary


Integumentary: Present: clear, warm, dry





- Psychiatric


Psychiatric: cooperative





- Neurologic


Neurologic: no focal deficits, moves all extremities





- Allied Health


Allied health notes reviewed: nursing, social work, case management








- Constitutional


Vitals: 


                                        











Temp Pulse Resp BP Pulse Ox


 


 98.0 F   88   20   138/81   95 


 


 08/29/20 12:11  08/29/20 12:11  08/29/20 12:11  08/29/20 12:11  08/29/20 12:11











General appearance: Present: no acute distress





Results





- Labs


CBC & Chem 7: 


                                 08/29/20 05:31





                                 08/27/20 04:28


Labs: 


                             Laboratory Last Values











WBC  11.2 K/mm3 (4.5-11.0)  H  08/29/20  05:31    


 


RBC  4.87 M/mm3 (3.65-5.03)   08/29/20  05:31    


 


Hgb  14.9 gm/dl (10.1-14.3)  H  08/29/20  05:31    


 


Hct  44.5 % (30.3-42.9)  H  08/29/20  05:31    


 


MCV  91 fl (79-97)   08/29/20  05:31    


 


MCH  31 pg (28-32)   08/29/20  05:31    


 


MCHC  34 % (30-34)   08/29/20  05:31    


 


RDW  16.5 % (13.2-15.2)  H  08/29/20  05:31    


 


Plt Count  98 K/mm3 (140-440)  L  08/29/20  05:31    


 


Lymph % (Auto)  10.6 % (13.4-35.0)  L  08/13/20  22:44    


 


Mono % (Auto)  9.8 % (0.0-7.3)  H  08/13/20  22:44    


 


Eos % (Auto)  0.0 % (0.0-4.3)   08/13/20  22:44    


 


Baso % (Auto)  1.2 % (0.0-1.8)   08/13/20  22:44    


 


Lymph #  1.5 K/mm3 (1.2-5.4)   08/13/20  22:44    


 


Mono #  1.3 K/mm3 (0.0-0.8)  H  08/13/20  22:44    


 


Eos #  0.0 K/mm3 (0.0-0.4)   08/13/20  22:44    


 


Baso #  0.2 K/mm3 (0.0-0.1)  H  08/13/20  22:44    


 


Add Manual Diff  Complete   08/21/20  04:50    


 


Total Counted  100   08/21/20  04:50    


 


Seg Neutrophils %  78.4 % (40.0-70.0)  H  08/13/20  22:44    


 


Seg Neuts % (Manual)  87.0 % (40.0-70.0)  H  08/21/20  04:50    


 


Band Neutrophils %  4.0 %  08/21/20  04:50    


 


Lymphocytes % (Manual)  2.0 % (13.4-35.0)  L  08/21/20  04:50    


 


Reactive Lymphs % (Man)  0 %  08/21/20  04:50    


 


Monocytes % (Manual)  6.0 % (0.0-7.3)   08/21/20  04:50    


 


Eosinophils % (Manual)  0 % (0.0-4.3)   08/21/20  04:50    


 


Basophils % (Manual)  0 % (0.0-1.8)   08/21/20  04:50    


 


Metamyelocytes %  1.0 %  08/21/20  04:50    


 


Myelocytes %  0 %  08/21/20  04:50    


 


Promyelocytes %  0 %  08/21/20  04:50    


 


Blast Cells %  0 %  08/21/20  04:50    


 


Nucleated RBC %  1.0 % (0.0-0.9)  H  08/21/20  04:50    


 


Seg Neutrophils #  10.8 K/mm3 (1.8-7.7)  H  08/13/20  22:44    


 


Seg Neutrophils # Man  14.4 K/mm3 (1.8-7.7)  H  08/21/20  04:50    


 


Band Neutrophils #  0.7 K/mm3  08/21/20  04:50    


 


Lymphocytes # (Manual)  0.3 K/mm3 (1.2-5.4)  L  08/21/20  04:50    


 


Abs React Lymphs (Man)  0.0 K/mm3  08/21/20  04:50    


 


Monocytes # (Manual)  1.0 K/mm3 (0.0-0.8)  H  08/21/20  04:50    


 


Eosinophils # (Manual)  0.0 K/mm3 (0.0-0.4)   08/21/20  04:50    


 


Basophils # (Manual)  0.0 K/mm3 (0.0-0.1)   08/21/20  04:50    


 


Metamyelocytes #  0.2 K/mm3  08/21/20  04:50    


 


Myelocytes #  0.0 K/mm3  08/21/20  04:50    


 


Promyelocytes #  0.0 K/mm3  08/21/20  04:50    


 


Blast Cells #  0.0 K/mm3  08/21/20  04:50    


 


WBC Morphology  Not Reportable   08/21/20  04:50    


 


Hypersegmented Neuts  Not Reportable   08/21/20  04:50    


 


Hyposegmented Neuts  Not Reportable   08/21/20  04:50    


 


Hypogranular Neuts  Not Reportable   08/21/20  04:50    


 


Smudge Cells  Not Reportable   08/21/20  04:50    


 


Toxic Granulation  Not Reportable   08/21/20  04:50    


 


Toxic Vacuolation  Not Reportable   08/21/20  04:50    


 


Dohle Bodies  Not Reportable   08/21/20  04:50    


 


Pelger-Huet Anomaly  Not Reportable   08/21/20  04:50    


 


Arnie Rods  Not Reportable   08/21/20  04:50    


 


Platelet Estimate  Consistent w auto   08/21/20  04:50    


 


Clumped Platelets  Not Reportable   08/21/20  04:50    


 


Plt Clumps, EDTA  Not Reportable   08/21/20  04:50    


 


Large Platelets  Not Reportable   08/21/20  04:50    


 


Giant Platelets  Not Reportable   08/21/20  04:50    


 


Platelet Satelliting  Not Reportable   08/21/20  04:50    


 


Plt Morphology Comment  Not Reportable   08/21/20  04:50    


 


RBC Morphology  Not Reportable   08/21/20  04:50    


 


Dimorphic RBCs  Not Reportable   08/21/20  04:50    


 


Polychromasia  Not Reportable   08/21/20  04:50    


 


Hypochromasia  Not Reportable   08/21/20  04:50    


 


Poikilocytosis  Not Reportable   08/21/20  04:50    


 


Anisocytosis  Not Reportable   08/21/20  04:50    


 


Microcytosis  Not Reportable   08/21/20  04:50    


 


Macrocytosis  1+   08/21/20  04:50    


 


Spherocytes  Not Reportable   08/21/20  04:50    


 


Pappenheimer Bodies  Not Reportable   08/21/20  04:50    


 


Sickle Cells  Not Reportable   08/21/20  04:50    


 


Target Cells  1+   08/21/20  04:50    


 


Tear Drop Cells  Not Reportable   08/21/20  04:50    


 


Ovalocytes  Not Reportable   08/21/20  04:50    


 


Helmet Cells  Not Reportable   08/21/20  04:50    


 


[place] Bodies  Not Reportable   08/21/20  04:50    


 


Cabot Rings  Not Reportable   08/21/20  04:50    


 


Jd Cells  Not Reportable   08/21/20  04:50    


 


Bite Cells  Not Reportable   08/21/20  04:50    


 


Crenated Cell  Not Reportable   08/21/20  04:50    


 


Elliptocytes  Not Reportable   08/21/20  04:50    


 


Acanthocytes (Spur)  Not Reportable   08/21/20  04:50    


 


Rouleaux  Not Reportable   08/21/20  04:50    


 


Hemoglobin C Crystals  Not Reportable   08/21/20  04:50    


 


Schistocytes  Not Reportable   08/21/20  04:50    


 


Malaria parasites  Not Reportable   08/21/20  04:50    


 


[place] Bodies  Not Reportable   08/21/20  04:50    


 


Hem Pathologist Commnt  No   08/21/20  04:50    


 


PT  14.9 Sec. (12.2-14.9)   08/20/20  15:23    


 


INR  1.15  (0.87-1.13)  H  08/20/20  15:23    


 


APTT  29.5 Sec. (24.2-36.6)   08/20/20  15:23    


 


D-Dimer  154.77 ng/mlDDU (0-234)   08/28/20  08:38    


 


Sodium  141 mmol/L (137-145)   08/27/20  04:28    


 


Potassium  4.0 mmol/L (3.6-5.0)   08/27/20  04:28    


 


Chloride  104.8 mmol/L ()   08/27/20  04:28    


 


Carbon Dioxide  24 mmol/L (22-30)   08/27/20  04:28    


 


Anion Gap  16 mmol/L  08/27/20  04:28    


 


BUN  20 mg/dL (7-17)  H  08/27/20  04:28    


 


Creatinine  0.8 mg/dL (0.6-1.2)   08/27/20  04:28    


 


Estimated GFR  > 60 ml/min  08/27/20  04:28    


 


BUN/Creatinine Ratio  25 %  08/27/20  04:28    


 


Glucose  164 mg/dL ()  H  08/27/20  04:28    


 


POC Glucose  215  ()  H  08/29/20  12:28    


 


Lactic Acid  1.30 mmol/L (0.7-2.0)   08/13/20  22:44    


 


Calcium  8.4 mg/dL (8.4-10.2)   08/27/20  04:28    


 


Ferritin  991.6 ng/mL (10.0-200.0)  H  08/22/20  09:32    


 


Total Bilirubin  0.40 mg/dL (0.1-1.2)   08/18/20  08:32    


 


Direct Bilirubin  0.3 mg/dL (0-0.2)  H  08/13/20  22:44    


 


Indirect Bilirubin  0.2 mg/dL  08/13/20  22:44    


 


AST  149 units/L (5-40)  H  08/18/20  08:32    


 


ALT  96 units/L (7-56)  H  08/18/20  08:32    


 


Alkaline Phosphatase  40 units/L ()   08/18/20  08:32    


 


Lactate Dehydrogenase  716 units/L ()  H  08/22/20  09:32    


 


Total Creatine Kinase  934 units/L ()  H  08/18/20  08:32    


 


C-Reactive Protein  0.80 mg/dL (0.00-1.30)   08/22/20  09:32    


 


Total Protein  6.1 g/dL (6.3-8.2)  L  08/18/20  08:32    


 


Albumin  2.5 g/dL (3.9-5)  L  08/18/20  08:32    


 


Albumin/Globulin Ratio  0.7 %  08/18/20  08:32    


 


Procalcitonin  0.71 ng/mL (<0.15)   08/18/20  08:32    


 


Urine Color  Tiffany  (Yellow)   08/14/20  Unknown


 


Urine Turbidity  Slightly-cloudy  (Clear)   08/14/20  Unknown


 


Urine pH  5.0  (5.0-7.0)   08/14/20  Unknown


 


Ur Specific Gravity  1.026  (1.003-1.030)   08/14/20  Unknown


 


Urine Protein  100 mg/dl mg/dL (Negative)   08/14/20  Unknown


 


Urine Glucose (UA)  Neg mg/dL (Negative)   08/14/20  Unknown


 


Urine Ketones  Tr mg/dL (Negative)   08/14/20  Unknown


 


Urine Blood  Lg  (Negative)   08/14/20  Unknown


 


Urine Nitrite  Neg  (Negative)   08/14/20  Unknown


 


Urine Bilirubin  Neg  (Negative)   08/14/20  Unknown


 


Urine Urobilinogen  2.0 mg/dL (<2.0)   08/14/20  Unknown


 


Ur Leukocyte Esterase  Neg  (Negative)   08/14/20  Unknown


 


Urine WBC (Auto)  17.0 /HPF (0.0-6.0)  H  08/14/20  Unknown


 


Urine RBC (Auto)  1.0 /HPF (0.0-6.0)   08/14/20  Unknown


 


U Epithel Cells (Auto)  4.0 /HPF (0-13.0)   08/14/20  Unknown


 


Urine Mucus  2+ /HPF  08/14/20  Unknown


 


Salicylates  < 0.3 mg/dL (2.8-20.0)  L  08/13/20  22:44    


 


Urine Opiates Screen  Presumptive negative   08/14/20  Unknown


 


Urine Methadone Screen  Presumptive negative   08/14/20  Unknown


 


Acetaminophen  5.0 ug/mL (10.0-30.0)  L  08/13/20  22:44    


 


Ur Barbiturates Screen  Presumptive negative   08/14/20  Unknown


 


Valproic Acid  31.8 ug/mL ()  L  08/17/20  08:46    


 


Ur Phencyclidine Scrn  Presumptive negative   08/14/20  Unknown


 


Ur Amphetamines Screen  Presumptive negative   08/14/20  Unknown


 


U Benzodiazepines Scrn  Presumptive negative   08/14/20  Unknown


 


Urine Cocaine Screen  Presumptive negative   08/14/20  Unknown


 


U Marijuana (THC) Screen  Presumptive negative   08/14/20  Unknown


 


Drugs of Abuse Note  Disclamer   08/14/20  Unknown


 


Plasma/Serum Alcohol  < 0.01 % (0-0.07)   08/13/20  22:44    


 


Coronavirus (PCR)  Positive  (Negative)  A  08/15/20  Unknown











Schmidt/IV: 


                                        





Voiding Method                   External Female Catheter


IV Catheter Type [Right Upper    Peripheral IV


arm]                             


IV Catheter Type [Right          INT / Saline Lock


Antecubital]                     


IV Catheter Type [Right Foot]    INT / Saline Lock


IV Catheter Type [Right          Peripheral IV


External Jugular]                











Active Medications





- Current Medications


Current Medications: 














Generic Name Dose Route Start Last Admin





  Trade Name Freq  PRN Reason Stop Dose Admin


 


Acetaminophen  650 mg  08/14/20 08:00  08/21/20 12:10





  Tylenol  PO   650 mg





  Q4H PRN   Administration





  Pain MILD(1-3)/Fever >100.5/HA  


 


Amlodipine Besylate  10 mg  08/24/20 19:00  08/29/20 12:10





  Amlodipine  PO   10 mg





  QDAY KATHYA   Administration


 


Lipase/Protease/Amylase  1 each  08/17/20 10:34 





  Pancreaze Dr 10,500 Unit  FEEDTUBE  





  PRN PRN  





  For Clogged Feeding Tube  


 


Benztropine Mesylate  1 mg  08/24/20 15:00  08/29/20 09:00





  Cogentin  PO   1 mg





  BID KATHYA   Administration


 


Clonidine HCl  0.2 mg  08/24/20 13:00  08/24/20 14:05





  Catapres-Tts Patch  TD   0.2 mg





  Mo KATHYA   Administration


 


Enoxaparin Sodium  40 mg  08/17/20 22:00  08/29/20 09:00





  Enoxaparin  SUB-Q   40 mg





  BID KATHYA   Administration


 


Hydralazine HCl  5 mg  08/24/20 16:44 





  Apresoline  IV  





  Q30MIN PRN  





  Hypertension  


 


Hydralazine HCl  75 mg  08/27/20 15:55  08/29/20 08:35





  Apresoline  PO   75 mg





  TID KATHYA   Administration


 


Insulin Human Lispro  0 unit  08/25/20 22:00  08/29/20 13:21





  Humalog  SUB-Q   3 unit





  ACHS KATHYA   Administration





  Protocol  


 


Miscellaneous Medication  325 mg  08/15/20 18:45 





  Paliperidone Palmitate [Invega Sustenna]  IM  





  Q3W KATHYA  


 


Olanzapine  5 mg  08/25/20 22:00  08/28/20 23:26





  Zyprexa  PO   5 mg





  QHS KATHYA   Administration


 


Ondansetron HCl  4 mg  08/14/20 08:00 





  Zofran  IV  





  Q8H PRN  





  Nausea And Vomiting  


 


Prednisone  20 mg  08/28/20 10:00  08/29/20 09:00





  Deltasone  PO  08/30/20 23:59  20 mg





  QDAY KATHYA   Administration


 


Prednisone  10 mg  08/31/20 10:00 





  Deltasone  PO  09/02/20 23:55 





  QDAY KATHYA  


 


Simple Syrup  15 ml  08/17/20 10:34 





  Simple Syrup  FEEDTUBE  





  PRN PRN  





  Hypoglycemia  


 


Simple Syrup  30 ml  08/17/20 10:34 





  Simple Syrup  FEEDTUBE  





  PRN PRN  





  Hypoglycemia  


 


Sodium Bicarbonate  325 mg  08/17/20 10:34 





  Sodium Bicarbonate  FEEDTUBE  





  PRN PRN  





  For Clogged Feeding Tube  


 


Sodium Chloride  10 ml  08/14/20 10:00  08/28/20 22:52





  Sodium Chloride Flush Syringe 10 Ml  IV   10 ml





  BID KATHYA   Administration


 


Trazodone HCl  50 mg  08/14/20 22:00  08/28/20 22:50





  Desyrel  PO   50 mg





  QHS KATHYA   Administration


 


Valproic Acid  1,500 mg  08/18/20 22:00  08/28/20 22:51





  Depakene Liq  PO   1,500 mg





  QHS KATHYA   Administration














Nutrition/Malnutrition Assess





- Dietary Evaluation


Nutrition/Malnutrition Findings: 


                                        





Nutrition Notes                                            Start:  08/17/20 

09:33


Freq:                                                      Status: Active       




Protocol:                                                                       




 Document     08/26/20 15:29  LM  (Rec: 08/26/20 15:35  LM  [id number])


 Nutrition Notes


     Initial or Follow up                        Reassessment


     Current Diagnosis                           Acute Kidney Injury,Sepsis


     Other Pertinent Diagnosis                   COVID-19 (+), Schizoaffective


                                                 disorder


     Current Diet                                Pureed


     Labs/Tests                                  POC glu 203


     Pertinent Medications                       Humalog


     Height                                      5 ft 6 in


     Weight                                      114.3 kg


     Ideal Body Weight (kg)                      59.09


     BMI                                         40.6


     Subjective/Other Information                Pt with good intakes in chart.


                                                 Pt ate 75% of breakfast today


                                                 . Pt not getting PEG.


     Percent of energy/protein needs met:        85%/83%


     Burn                                        Absent


     Trauma                                      Absent


     GI Symptoms                                 None


     Food Allergy                                No


     Current % PO                                Good (%)


     Minimum of two criteria                     No


     Reduced  Strength                       Measurably Reduced (severe)


     #1


      Nutrition Diagnosis                        Inadequate oral intake


      As Evidenced by Signs and Symptoms         pt eating 75% of pureed diet


      Diagnosis Progress(for reassessment        Improved


       documentation)                            


     Is patient on ventilator?                   No


     Is Patient Ambulatory and/or Out of Bed     No


     REE-([facility]-confined to bed)      [phone]


     Kcal/Kg value to use for calculation        14


     Approximate Energy Requirements Using       1600


      kcal/Kg                                    


     Calculation Used for Recommendations        Kcal/kg


     Additional Notes                            Pro needs 0.8-1g/kg adjBW: 69-


                                                 87g/day


                                                 Fluid needs 1ml/kcal


 Nutrition Intervention


     Change Diet Order:                          Continue current diet order as


                                                 tolerated; add consistent CHO


                                                 modifier


     Goal #1                                     Meet at least 75% of energy


                                                 and protein needs


     Anticipated Discharge Needs:                Unable to determine at this


                                                 time


     Follow-Up By:                               09/01/20


     Additional Comments                         F/U for stable intakes

## 2020-08-30 RX ADMIN — INSULIN LISPRO SCH: 100 INJECTION, SOLUTION INTRAVENOUS; SUBCUTANEOUS at 23:19

## 2020-08-30 RX ADMIN — INSULIN LISPRO SCH: 100 INJECTION, SOLUTION INTRAVENOUS; SUBCUTANEOUS at 08:00

## 2020-08-30 RX ADMIN — Medication SCH ML: at 21:38

## 2020-08-30 RX ADMIN — INSULIN LISPRO SCH UNIT: 100 INJECTION, SOLUTION INTRAVENOUS; SUBCUTANEOUS at 13:42

## 2020-08-30 RX ADMIN — ENOXAPARIN SODIUM SCH MG: 100 INJECTION SUBCUTANEOUS at 10:12

## 2020-08-30 RX ADMIN — BENZTROPINE MESYLATE SCH MG: 1 TABLET ORAL at 10:35

## 2020-08-30 RX ADMIN — BENZTROPINE MESYLATE SCH MG: 1 TABLET ORAL at 21:36

## 2020-08-30 RX ADMIN — Medication SCH ML: at 10:12

## 2020-08-30 RX ADMIN — INSULIN LISPRO SCH UNIT: 100 INJECTION, SOLUTION INTRAVENOUS; SUBCUTANEOUS at 18:34

## 2020-08-30 RX ADMIN — ENOXAPARIN SODIUM SCH MG: 100 INJECTION SUBCUTANEOUS at 21:38

## 2020-08-30 RX ADMIN — VALPROIC ACID SCH MG: 250 SOLUTION ORAL at 21:37

## 2020-08-30 NOTE — PROGRESS NOTE
Assessment and Plan


Assessment and plan: 


This is a 55-year-old female with paranoid schizophrenia that presents to the 

emergency department on 8/13 for altered mental status in a catatonic state.  

Previous hospitalizations reviewed and only notes a history of paranoid 

schizophrenia. Patient may be a resident of [facility].  Patient is currently 

nonverbal and HPI is received from ER documentation.  Work-up in the emergency 

department included a CT abdomen pelvis which shows patchy peripheral 

groundglass opacities bilateral lungs which is consistent with either viral or 

atypical pneumonia, positive anterior lateral fat hernia in the left mid abdomen

and a large uterine fibroids however her CXR showed no acute pulmonary or 

pleural abnormalities.  She was found to have acute kidney injury with a 

creatinine of 1.4/BUN 33 as baseline from previous records seems to be 0.7, 

rhabdomyolysis with a creatinine kinase of 33,481, leukocytosis with a WBC of 

13.8, thrombocytopenia with platelets at 97.  She has COVID pneumonia with IV 

antibiotics, rhabdomyolysis, and WHITNEY. Infectious disease, psych, and neurology 

have been consulted. Neurology does not suspect NMS at this time. Psych has 

signed off at this time due to ongoing acute medical processes.  PT/ST consulted

for evaluation but unable to be performed as patient will not follow verbal 

commands.  She will open her eyes to verbal stimuli and intermittently follow 

commands.  Patient remains nonverbal and only groans/moans to painful stimuli. 

Currently on RA. Her caregiver has agreed to bring pt home with HHPT. 





* 8/27: continue care, pending discharge details w/ CM and caregiver


* 8/26: continue supportive care, awaiting placement with HHPT


* 8/25: Continue care, psych reconsulted but signed off with some medication 

  adjustments


* 8/24: pureed diet


* 8/21: continue care


* 8/20: GI consult for peg


* 8/19: continue care


* Discussed with primary caretaker patient was normally verbal but sometimes 

  goes into a catatonic state.  Phone number for this is [phone]


* 8/18: PT consulted.  Psych signed off.  Continue treatment


* 8/17: continue treatment


* 8/16: Change fluids to NS 1/2 AT 125CC/hr. Rhabdomylysis improving, insert NGT

  and start tube feeds. Psych input noted. Continue management for COVID 19. ID 

  consulted. No new fever.  CTA chest and CTH obtained which were both negative


* 8/15: COVID PCR Positive


8/29: Patient remains in the hospital as hospital bed is being arranged for the 

patient awaiting placement at this time.  Discharge was planned for yesterday 

this is held up due to bed condition.  Discussed with nursing staff to continue 

passive range of motion with the patient sitting up out of bed to chair and fall

precautions.





8/30: Clinical stable, continue to encourage OOB to chair, ANTICIPATE DISCHARGE 

IN AM





Patient Problems


(1) Pneumonia due to COVID-19 virus


Current Visit: Yes   Status: Acute   


Plan to address problem: 


- COVID protocol initiated


- IV azithromycin and Rocephin completed


- 8/14 COVID PCR positive


- Trend LDH, CRP, procalcitonin, d-dimer, ferritin 


- Droplet /contact precautions


- Supplemental oxygenation as needed


- Infectious disease consulted


- Pulmonary hygiene


- In setting of elevated D-Dimer, CTA Chest was ordered which was negative for 

pulmonary embolism


- Anticoagulation per COVID protocol, will DC with Eliquis 2.5 BID PO for 30 

days


- Cannot initiate Remdesivir in setting of elevated LFTs


- IV solumedrel changed to PO prednisone and started on taper








(2) Hypertension


Current Visit: Yes   Status: Chronic   


Plan to address problem: 


- BP monitoring per protocol


- PRN hydralizine


- Clonidine TD and hydralazine PO








(3) Paranoid schizophrenia


Current Visit: Yes   Status: Chronic   


Plan to address problem: 


-History of present schizophrenia


-8/24 Psych reconsulted


-Depakote and Haldol 


-Sleep hygiene


-Reorientation as needed








(4) Severe sepsis


Current Visit: Yes   Status: Resolved   


Plan to address problem: 


- Presented with low-grade fever, tachycardia, leukocytosis, WHITNEY, and bilateral 

pneumonia


- Likely source is bilateral pneumonia secondary to COVID-19


- IV antibiotics completed


- Covid protocol initiated











(5) DVT prophylaxis


Current Visit: Yes   Status: Acute   


Plan to address problem: 


- SCDs to BLE while in bed


- Lovenox subq twice daily per COVID protocol








(6) Discharge planning issues


Current Visit: Yes   Status: Acute   








History


Interval history: 


Patient seen and examined clinically improving answer some questions.  Laying in

bed





Hospitalist Physical





- Physical exam


Narrative exam: 


 General appearance: Present: no acute distress, obese. No acute event reported 

overnight





- EENT


Eyes: Present: PERRL





- Neck


Neck: Present: normal ROM





- Respiratory


Respiratory effort: normal


Respiratory: bilateral: diminished (2/2 girth)





- Cardiovascular


Rhythm: regular


Heart Sounds: Present: S1 & S2.  Absent: systolic murmur, diastolic murmur





- Extremities


Extremities: no ischemia, pulses intact, pulses symmetrical, No edema, normal 

temperature, normal color, Full ROM


Peripheral Pulses: within normal limits





- Abdominal


General gastrointestinal: soft, non-tender, normal bowel sounds





- Integumentary


Integumentary: Present: clear, warm, dry





- Psychiatric


Psychiatric: cooperative





- Neurologic


Neurologic: no focal deficits, moves all extremities





- Allied Health


Allied health notes reviewed: nursing, social work, case management








- Constitutional


Vitals: 


                                        











Temp Pulse Resp BP Pulse Ox


 


 97.2 F L  109 H  16   138/91   96 


 


 08/30/20 05:31  08/30/20 05:31  08/30/20 05:31  08/30/20 05:31  08/30/20 05:31











General appearance: Present: no acute distress





Results





- Labs


CBC & Chem 7: 


                                 08/29/20 05:31





                                 08/27/20 04:28


Labs: 


                             Laboratory Last Values











WBC  11.2 K/mm3 (4.5-11.0)  H  08/29/20  05:31    


 


RBC  4.87 M/mm3 (3.65-5.03)   08/29/20  05:31    


 


Hgb  14.9 gm/dl (10.1-14.3)  H  08/29/20  05:31    


 


Hct  44.5 % (30.3-42.9)  H  08/29/20  05:31    


 


MCV  91 fl (79-97)   08/29/20  05:31    


 


MCH  31 pg (28-32)   08/29/20  05:31    


 


MCHC  34 % (30-34)   08/29/20  05:31    


 


RDW  16.5 % (13.2-15.2)  H  08/29/20  05:31    


 


Plt Count  98 K/mm3 (140-440)  L  08/29/20  05:31    


 


Lymph % (Auto)  10.6 % (13.4-35.0)  L  08/13/20  22:44    


 


Mono % (Auto)  9.8 % (0.0-7.3)  H  08/13/20  22:44    


 


Eos % (Auto)  0.0 % (0.0-4.3)   08/13/20  22:44    


 


Baso % (Auto)  1.2 % (0.0-1.8)   08/13/20  22:44    


 


Lymph #  1.5 K/mm3 (1.2-5.4)   08/13/20  22:44    


 


Mono #  1.3 K/mm3 (0.0-0.8)  H  08/13/20  22:44    


 


Eos #  0.0 K/mm3 (0.0-0.4)   08/13/20  22:44    


 


Baso #  0.2 K/mm3 (0.0-0.1)  H  08/13/20  22:44    


 


Add Manual Diff  Complete   08/21/20  04:50    


 


Total Counted  100   08/21/20  04:50    


 


Seg Neutrophils %  78.4 % (40.0-70.0)  H  08/13/20  22:44    


 


Seg Neuts % (Manual)  87.0 % (40.0-70.0)  H  08/21/20  04:50    


 


Band Neutrophils %  4.0 %  08/21/20  04:50    


 


Lymphocytes % (Manual)  2.0 % (13.4-35.0)  L  08/21/20  04:50    


 


Reactive Lymphs % (Man)  0 %  08/21/20  04:50    


 


Monocytes % (Manual)  6.0 % (0.0-7.3)   08/21/20  04:50    


 


Eosinophils % (Manual)  0 % (0.0-4.3)   08/21/20  04:50    


 


Basophils % (Manual)  0 % (0.0-1.8)   08/21/20  04:50    


 


Metamyelocytes %  1.0 %  08/21/20  04:50    


 


Myelocytes %  0 %  08/21/20  04:50    


 


Promyelocytes %  0 %  08/21/20  04:50    


 


Blast Cells %  0 %  08/21/20  04:50    


 


Nucleated RBC %  1.0 % (0.0-0.9)  H  08/21/20  04:50    


 


Seg Neutrophils #  10.8 K/mm3 (1.8-7.7)  H  08/13/20  22:44    


 


Seg Neutrophils # Man  14.4 K/mm3 (1.8-7.7)  H  08/21/20  04:50    


 


Band Neutrophils #  0.7 K/mm3  08/21/20  04:50    


 


Lymphocytes # (Manual)  0.3 K/mm3 (1.2-5.4)  L  08/21/20  04:50    


 


Abs React Lymphs (Man)  0.0 K/mm3  08/21/20  04:50    


 


Monocytes # (Manual)  1.0 K/mm3 (0.0-0.8)  H  08/21/20  04:50    


 


Eosinophils # (Manual)  0.0 K/mm3 (0.0-0.4)   08/21/20  04:50    


 


Basophils # (Manual)  0.0 K/mm3 (0.0-0.1)   08/21/20  04:50    


 


Metamyelocytes #  0.2 K/mm3  08/21/20  04:50    


 


Myelocytes #  0.0 K/mm3  08/21/20  04:50    


 


Promyelocytes #  0.0 K/mm3  08/21/20  04:50    


 


Blast Cells #  0.0 K/mm3  08/21/20  04:50    


 


WBC Morphology  Not Reportable   08/21/20  04:50    


 


Hypersegmented Neuts  Not Reportable   08/21/20  04:50    


 


Hyposegmented Neuts  Not Reportable   08/21/20  04:50    


 


Hypogranular Neuts  Not Reportable   08/21/20  04:50    


 


Smudge Cells  Not Reportable   08/21/20  04:50    


 


Toxic Granulation  Not Reportable   08/21/20  04:50    


 


Toxic Vacuolation  Not Reportable   08/21/20  04:50    


 


Dohle Bodies  Not Reportable   08/21/20  04:50    


 


Pelger-Huet Anomaly  Not Reportable   08/21/20  04:50    


 


Arnie Rods  Not Reportable   08/21/20  04:50    


 


Platelet Estimate  Consistent w auto   08/21/20  04:50    


 


Clumped Platelets  Not Reportable   08/21/20  04:50    


 


Plt Clumps, EDTA  Not Reportable   08/21/20  04:50    


 


Large Platelets  Not Reportable   08/21/20  04:50    


 


Giant Platelets  Not Reportable   08/21/20  04:50    


 


Platelet Satelliting  Not Reportable   08/21/20  04:50    


 


Plt Morphology Comment  Not Reportable   08/21/20  04:50    


 


RBC Morphology  Not Reportable   08/21/20  04:50    


 


Dimorphic RBCs  Not Reportable   08/21/20  04:50    


 


Polychromasia  Not Reportable   08/21/20  04:50    


 


Hypochromasia  Not Reportable   08/21/20  04:50    


 


Poikilocytosis  Not Reportable   08/21/20  04:50    


 


Anisocytosis  Not Reportable   08/21/20  04:50    


 


Microcytosis  Not Reportable   08/21/20  04:50    


 


Macrocytosis  1+   08/21/20  04:50    


 


Spherocytes  Not Reportable   08/21/20  04:50    


 


Pappenheimer Bodies  Not Reportable   08/21/20  04:50    


 


Sickle Cells  Not Reportable   08/21/20  04:50    


 


Target Cells  1+   08/21/20  04:50    


 


Tear Drop Cells  Not Reportable   08/21/20  04:50    


 


Ovalocytes  Not Reportable   08/21/20  04:50    


 


Helmet Cells  Not Reportable   08/21/20  04:50    


 


[place] Bodies  Not Reportable   08/21/20  04:50    


 


Cabot Rings  Not Reportable   08/21/20  04:50    


 


[place] Cells  Not Reportable   08/21/20  04:50    


 


Bite Cells  Not Reportable   08/21/20  04:50    


 


Crenated Cell  Not Reportable   08/21/20  04:50    


 


Elliptocytes  Not Reportable   08/21/20  04:50    


 


Acanthocytes (Spur)  Not Reportable   08/21/20  04:50    


 


Rouleaux  Not Reportable   08/21/20  04:50    


 


Hemoglobin C Crystals  Not Reportable   08/21/20  04:50    


 


Schistocytes  Not Reportable   08/21/20  04:50    


 


Malaria parasites  Not Reportable   08/21/20  04:50    


 


[place] Bodies  Not Reportable   08/21/20  04:50    


 


Hem Pathologist Commnt  No   08/21/20  04:50    


 


PT  14.9 Sec. (12.2-14.9)   08/20/20  15:23    


 


INR  1.15  (0.87-1.13)  H  08/20/20  15:23    


 


APTT  29.5 Sec. (24.2-36.6)   08/20/20  15:23    


 


D-Dimer  306.74 ng/mlDDU (0-234)  H  08/30/20  05:49    


 


Sodium  141 mmol/L (137-145)   08/27/20  04:28    


 


Potassium  4.0 mmol/L (3.6-5.0)   08/27/20  04:28    


 


Chloride  104.8 mmol/L ()   08/27/20  04:28    


 


Carbon Dioxide  24 mmol/L (22-30)   08/27/20  04:28    


 


Anion Gap  16 mmol/L  08/27/20  04:28    


 


BUN  20 mg/dL (7-17)  H  08/27/20  04:28    


 


Creatinine  0.8 mg/dL (0.6-1.2)   08/27/20  04:28    


 


Estimated GFR  > 60 ml/min  08/27/20  04:28    


 


BUN/Creatinine Ratio  25 %  08/27/20  04:28    


 


Glucose  164 mg/dL ()  H  08/27/20  04:28    


 


POC Glucose  177  ()  H  08/29/20  22:30    


 


Lactic Acid  1.30 mmol/L (0.7-2.0)   08/13/20  22:44    


 


Calcium  8.4 mg/dL (8.4-10.2)   08/27/20  04:28    


 


Ferritin  991.6 ng/mL (10.0-200.0)  H  08/22/20  09:32    


 


Total Bilirubin  0.40 mg/dL (0.1-1.2)   08/18/20  08:32    


 


Direct Bilirubin  0.3 mg/dL (0-0.2)  H  08/13/20  22:44    


 


Indirect Bilirubin  0.2 mg/dL  08/13/20  22:44    


 


AST  149 units/L (5-40)  H  08/18/20  08:32    


 


ALT  96 units/L (7-56)  H  08/18/20  08:32    


 


Alkaline Phosphatase  40 units/L ()   08/18/20  08:32    


 


Lactate Dehydrogenase  716 units/L ()  H  08/22/20  09:32    


 


Total Creatine Kinase  934 units/L ()  H  08/18/20  08:32    


 


C-Reactive Protein  0.80 mg/dL (0.00-1.30)   08/22/20  09:32    


 


Total Protein  6.1 g/dL (6.3-8.2)  L  08/18/20  08:32    


 


Albumin  2.5 g/dL (3.9-5)  L  08/18/20  08:32    


 


Albumin/Globulin Ratio  0.7 %  08/18/20  08:32    


 


Procalcitonin  0.71 ng/mL (<0.15)   08/18/20  08:32    


 


Urine Color  Tiffany  (Yellow)   08/14/20  Unknown


 


Urine Turbidity  Slightly-cloudy  (Clear)   08/14/20  Unknown


 


Urine pH  5.0  (5.0-7.0)   08/14/20  Unknown


 


Ur Specific Gravity  1.026  (1.003-1.030)   08/14/20  Unknown


 


Urine Protein  100 mg/dl mg/dL (Negative)   08/14/20  Unknown


 


Urine Glucose (UA)  Neg mg/dL (Negative)   08/14/20  Unknown


 


Urine Ketones  Tr mg/dL (Negative)   08/14/20  Unknown


 


Urine Blood  Lg  (Negative)   08/14/20  Unknown


 


Urine Nitrite  Neg  (Negative)   08/14/20  Unknown


 


Urine Bilirubin  Neg  (Negative)   08/14/20  Unknown


 


Urine Urobilinogen  2.0 mg/dL (<2.0)   08/14/20  Unknown


 


Ur Leukocyte Esterase  Neg  (Negative)   08/14/20  Unknown


 


Urine WBC (Auto)  17.0 /HPF (0.0-6.0)  H  08/14/20  Unknown


 


Urine RBC (Auto)  1.0 /HPF (0.0-6.0)   08/14/20  Unknown


 


U Epithel Cells (Auto)  4.0 /HPF (0-13.0)   08/14/20  Unknown


 


Urine Mucus  2+ /HPF  08/14/20  Unknown


 


Salicylates  < 0.3 mg/dL (2.8-20.0)  L  08/13/20  22:44    


 


Urine Opiates Screen  Presumptive negative   08/14/20  Unknown


 


Urine Methadone Screen  Presumptive negative   08/14/20  Unknown


 


Acetaminophen  5.0 ug/mL (10.0-30.0)  L  08/13/20  22:44    


 


Ur Barbiturates Screen  Presumptive negative   08/14/20  Unknown


 


Valproic Acid  31.8 ug/mL ()  L  08/17/20  08:46    


 


Ur Phencyclidine Scrn  Presumptive negative   08/14/20  Unknown


 


Ur Amphetamines Screen  Presumptive negative   08/14/20  Unknown


 


U Benzodiazepines Scrn  Presumptive negative   08/14/20  Unknown


 


Urine Cocaine Screen  Presumptive negative   08/14/20  Unknown


 


U Marijuana (THC) Screen  Presumptive negative   08/14/20  Unknown


 


Drugs of Abuse Note  Disclamer   08/14/20  Unknown


 


Plasma/Serum Alcohol  < 0.01 % (0-0.07)   08/13/20  22:44    


 


Coronavirus (PCR)  Positive  (Negative)  A  08/15/20  Unknown











Schmidt/IV: 


                                        





Voiding Method                   External Female Catheter


IV Catheter Type [Right Upper    Peripheral IV


arm]                             


IV Catheter Type [Right          INT / Saline Lock


Antecubital]                     


IV Catheter Type [Right Foot]    INT / Saline Lock


IV Catheter Type [Right          Peripheral IV


External Jugular]                











Active Medications





- Current Medications


Current Medications: 














Generic Name Dose Route Start Last Admin





  Trade Name Freq  PRN Reason Stop Dose Admin


 


Acetaminophen  650 mg  08/14/20 08:00  08/21/20 12:10





  Tylenol  PO   650 mg





  Q4H PRN   Administration





  Pain MILD(1-3)/Fever >100.5/HA  


 


Amlodipine Besylate  10 mg  08/24/20 19:00  08/29/20 12:10





  Amlodipine  PO   10 mg





  QDAY KATHYA   Administration


 


Lipase/Protease/Amylase  1 each  08/17/20 10:34 





  Pancreazkim [place] 10,500 Unit  FEEDTUBE  





  PRN PRN  





  For Clogged Feeding Tube  


 


Benztropine Mesylate  1 mg  08/24/20 15:00  08/29/20 21:41





  Cogentin  PO   1 mg





  BID KATHYA   Administration


 


Clonidine HCl  0.2 mg  08/24/20 13:00  08/24/20 14:05





  Catapres-Tts Patch  TD   0.2 mg





  Mo KATHYA   Administration


 


Enoxaparin Sodium  40 mg  08/17/20 22:00  08/29/20 21:37





  Enoxaparin  SUB-Q   40 mg





  BID KATHYA   Administration


 


Hydralazine HCl  5 mg  08/24/20 16:44 





  Apresoline  IV  





  Q30MIN PRN  





  Hypertension  


 


Hydralazine HCl  75 mg  08/27/20 15:55  08/29/20 21:38





  Apresoline  PO   75 mg





  TID KATHYA   Administration


 


Insulin Human Lispro  0 unit  08/25/20 22:00  08/29/20 22:00





  Humalog  SUB-Q   Not Given





  ACHS KATHYA  





  Protocol  


 


Olanzapine  5 mg  08/25/20 22:00  08/29/20 21:41





  Zyprexa  PO   5 mg





  QHS KATHYA   Administration


 


Ondansetron HCl  4 mg  08/14/20 08:00 





  Zofran  IV  





  Q8H PRN  





  Nausea And Vomiting  


 


Prednisone  20 mg  08/28/20 10:00  08/29/20 09:00





  Deltasone  PO  08/30/20 23:59  20 mg





  QDAY KATHYA   Administration


 


Prednisone  10 mg  08/31/20 10:00 





  Deltasone  PO  09/02/20 23:55 





  QDAY KATHYA  


 


Simple Syrup  15 ml  08/17/20 10:34 





  Simple Syrup  FEEDTUBE  





  PRN PRN  





  Hypoglycemia  


 


Simple Syrup  30 ml  08/17/20 10:34 





  Simple Syrup  FEEDTUBE  





  PRN PRN  





  Hypoglycemia  


 


Sodium Bicarbonate  325 mg  08/17/20 10:34 





  Sodium Bicarbonate  FEEDTUBE  





  PRN PRN  





  For Clogged Feeding Tube  


 


Sodium Chloride  10 ml  08/14/20 10:00  08/29/20 21:41





  Sodium Chloride Flush Syringe 10 Ml  IV   10 ml





  BID KATHYA   Administration


 


Trazodone HCl  50 mg  08/14/20 22:00  08/29/20 21:38





  Desyrel  PO   50 mg





  QHS KATHYA   Administration


 


Valproic Acid  1,500 mg  08/18/20 22:00  08/29/20 21:37





  Depakene Liq  PO   1,500 mg





  QHS KATHYA   Administration














Nutrition/Malnutrition Assess





- Dietary Evaluation


Nutrition/Malnutrition Findings: 


                                        





Nutrition Notes                                            Start:  08/17/20 

09:33


Freq:                                                      Status: Active       




Protocol:                                                                       




 Document     08/26/20 15:29  LM  (Rec: 08/26/20 15:35  LM  [id number])


 Nutrition Notes


     Initial or Follow up                        Reassessment


     Current Diagnosis                           Acute Kidney Injury,Sepsis


     Other Pertinent Diagnosis                   COVID-19 (+), Schizoaffective


                                                 disorder


     Current Diet                                Pureed


     Labs/Tests                                  POC glu 203


     Pertinent Medications                       Humalog


     Height                                      5 ft 6 in


     Weight                                      114.3 kg


     Ideal Body Weight (kg)                      59.09


     BMI                                         40.6


     Subjective/Other Information                Pt with good intakes in chart.


                                                 Pt ate 75% of breakfast today


                                                 . Pt not getting PEG.


     Percent of energy/protein needs met:        85%/83%


     Burn                                        Absent


     Trauma                                      Absent


     GI Symptoms                                 None


     Food Allergy                                No


     Current % PO                                Good (%)


     Minimum of two criteria                     No


     Reduced  Strength                       Measurably Reduced (severe)


     #1


      Nutrition Diagnosis                        Inadequate oral intake


      As Evidenced by Signs and Symptoms         pt eating 75% of pureed diet


      Diagnosis Progress(for reassessment        Improved


       documentation)                            


     Is patient on ventilator?                   No


     Is Patient Ambulatory and/or Out of Bed     No


     [facility]-([facility]-confined to bed)      [phone]


     Kcal/Kg value to use for calculation        14


     Approximate Energy Requirements Using       1600


      kcal/Kg                                    


     Calculation Used for Recommendations        Kcal/kg


     Additional Notes                            Pro needs 0.8-1g/kg adjBW: 69-


                                                 87g/day


                                                 Fluid needs 1ml/kcal


 Nutrition Intervention


     Change Diet Order:                          Continue current diet order as


                                                 tolerated; add consistent CHO


                                                 modifier


     Goal #1                                     Meet at least 75% of energy


                                                 and protein needs


     Anticipated Discharge Needs:                Unable to determine at this


                                                 time


     Follow-Up By:                               09/01/20


     Additional Comments                         F/U for stable intakes

## 2020-08-31 RX ADMIN — ENOXAPARIN SODIUM SCH MG: 100 INJECTION SUBCUTANEOUS at 10:35

## 2020-08-31 RX ADMIN — ENOXAPARIN SODIUM SCH MG: 100 INJECTION SUBCUTANEOUS at 23:46

## 2020-08-31 RX ADMIN — Medication SCH ML: at 23:47

## 2020-08-31 RX ADMIN — INSULIN LISPRO SCH: 100 INJECTION, SOLUTION INTRAVENOUS; SUBCUTANEOUS at 10:14

## 2020-08-31 RX ADMIN — CLONIDINE SCH MG: 0.2 PATCH TRANSDERMAL at 13:29

## 2020-08-31 RX ADMIN — INSULIN LISPRO SCH: 100 INJECTION, SOLUTION INTRAVENOUS; SUBCUTANEOUS at 13:29

## 2020-08-31 RX ADMIN — VALPROIC ACID SCH MG: 250 SOLUTION ORAL at 23:46

## 2020-08-31 RX ADMIN — INSULIN LISPRO SCH UNIT: 100 INJECTION, SOLUTION INTRAVENOUS; SUBCUTANEOUS at 18:39

## 2020-08-31 RX ADMIN — BENZTROPINE MESYLATE SCH MG: 1 TABLET ORAL at 23:46

## 2020-08-31 RX ADMIN — Medication SCH ML: at 10:37

## 2020-08-31 RX ADMIN — BENZTROPINE MESYLATE SCH MG: 1 TABLET ORAL at 10:35

## 2020-08-31 NOTE — DISCHARGE SUMMARY
<PADMA AMESMarco A - Last Filed: 08/31/20 14:53>





Providers





- Providers


Date of Admission: 


08/14/20 15:51





Attending physician: 


CASA ULRICH MD





                                        





08/14/20 07:59


Consult to Mental Health [CONS] Urgent 


   Reason For Exam: Catatonic state w/ psych history





08/14/20 08:16


Consult to Physician [CONS] Routine 


   Comment: 


   Consulting Provider: JUANA CAMPOS


   Physician Instructions: 


   Reason For Exam: Covid PUI





08/14/20 09:30


Consult to Case Management [CONS] Routine 


   Services Needed at Discharge: 


   Notified:: yes


   Phone number called:: in person


   Was contact made?: No


   Time called:: 13:30





08/14/20 11:33


Consult to Physician [CONS] Routine 


   Comment: 


   Consulting Provider: VIBHA REYNOLDS


   Physician Instructions: 


   Reason For Exam: NEUROLEPTIC MALIGNANT SYNDROME





08/16/20 14:51


Consult to Dietitian/Nutrition [CONS] Routine 


   Physician Instructions: 


   Reason For Exam: 


   Reason for Consult: Write/Manage Tube Feeding





08/16/20 14:52


Consult to Dietitian/Nutrition [CONS] Routine 


   Physician Instructions: 


   Reason For Exam: 


   Reason for Consult: Write/Manage Tube Feeding





08/18/20 11:47


Physical Therapy Evaluation and Treat [CONS] Routine 


   Comment: 


   Reason For Exam: placement


Speech Therapy Evaluation and Treat [CONS] Routine 


   Reason For Exam: aspiration





08/20/20 12:24


Consult to Physician [CONS] Routine 


   Comment: 


   Consulting Provider: [facility]


   Physician Instructions: 


   Reason For Exam: PEG placement





08/20/20 12:26


Consult to Physician [CONS] Routine 


   Comment: 


   Consulting Provider: TL TURNER


   Physician Instructions: 


   Reason For Exam: PEG placement





08/25/20 09:38


Physical Therapy Evaluation and Treat [CONS] Urgent 


   Comment: 


   Reason For Exam: To assist with transitional planning


   Date of last referral: 08/19/20











Primary care physician: 


PRIMARY CARE MD








Hospitalization


Condition: Stable


Pertinent studies: 


8/13 CXR shows suboptimal inspiration. No definite acute pulmonary or pleural 

abnormality 


8/14 CT abd shows patchy peripheral groundglass opacity in both lungs consistent

 with either viral or atypical pneumonia, posterior lateral hernia of fat in the

 left midabdomen and enlarged fibroid uterus 


8/16 CTH shows mild microvascular angiopathy and cerebral atrophy without 

evidence of acute intracranial hemorrhage. 


8/16 CTA chest shows no CT evidence for pulmonary embolism and patchy areas of 

groundglass opacity in both lungs suggestive of viral pneumonia


Hospital course: 


This is a 55-year-old female with paranoid schizophrenia that presents to the 

emergency department on 8/13 for altered mental status in a catatonic state.  

Previous hospitalizations reviewed and only notes a history of paranoid 

schizophrenia. Patient may be a resident of [place].  Patient is currently 

nonverbal and HPI is received from ER documentation.  Work-up in the emergency 

department included a CT abdomen pelvis which shows patchy peripheral 

groundglass opacities bilateral lungs which is consistent with either viral or 

atypical pneumonia, positive anterior lateral fat hernia in the left mid abdomen

 and a large uterine fibroids however her CXR showed no acute pulmonary or 

pleural abnormalities.  She was found to have acute kidney injury with a 

creatinine of 1.4/BUN 33 as baseline from previous records seems to be 0.7, 

rhabdomyolysis with a creatinine kinase of 33,481, leukocytosis with a WBC of 

13.8, thrombocytopenia with platelets at 97.  She has COVID pneumonia and was 

started on IV antibiotics, rhabdomyolysis, and WHITNEY. Infectious disease, psych, 

and neurology have been consulted. Neurology does not suspect NMS at this time. 

Psych does not recommend inpt acute hospitalization at this time.  On 8/16 CTA 

chest with obtained for elevated d-dimer and CTh head was obtained for altered 

mental status both of which showed no acute process. On 8/28 GI was consulted 

for possible PEG placement due to patient unresponsiveness to verbal stimuli ho

wever she was later started on a pured diet on 8/24.  On 8/25 psych was 

reconsulted for pill rolling EPS symptoms some adjustments were made to her 

medication.  Physical therapy and speech therapy were consulted for evaluation 

but unable to be performed as patient will not follow verbal commands. Patient 

remains nonverbal and only groans/moans to painful stimuli.  She has completed 

her antibiotics for COVID pneumonia and her rhabdomyolysis, WHITNEY secondary to 

rhabdomyolysis and vasomotor nephropathy, and thrombocytopenia has resolved. She

 will be discharged with her caregiver with HHPT, on a steroid taper and 30 days

 of eliquis.  Please follow-up with your primary care physician and outpatient 

psychiatric services within 1 to 2 weeks of discharge. 





(1) Severe sepsis


Current Visit: Yes   Status: Resolved   


Plan to address problem: 


-  Presented with low-grade fever, tachycardia, leukocytosis, WHITNEY, and bilateral

 pneumonia


- Likely source is bilateral pneumonia secondary to COVID-19


- IV antibiotics completed





(2) Pneumonia due to COVID-19 virus


Current Visit: Yes   Status: Acute   


Plan to address problem: 


- IV azithromycin and Rocephin completed


- 8/14 COVID PCR positive


- IV solumedrel changed to PO prednisone and started on taper, be discharged on 

taper


- Given elevated D-Dimer will be discharged with PO Eiquis 2.5 BID for 30 days





(3) Paranoid schizophrenia


Current Visit: Yes   Status: Chronic   


Plan to address problem: 


- History of present schizophrenia


- [facility] psych does not recommend inpatient hospitalization at this time


- Depakote and Haldol 





(4) Leukocytosis


Current Visit: Yes   Status: Acute   


Plan to address problem: 


- Admit WBC 13.8


- Now trending down, on steroid taper





(5) Hypertension


Current Visit: Yes   Status: Chronic   


Plan to address problem: 


- Clonidine TD and hydralazine PO


- Blood pressure monitoring as prescribed per primary care physician





Disposition: DC/[place]-06 HOME UNDER HOME [facility]


Time spent for discharge: 30





Core Measure Documentation





- Palliative Care


Palliative Care/ Comfort Measures: Not Applicable





- Core Measures


Any of the following diagnoses?: none





Exam





- Constitutional


Vitals: 


                                        











Temp Pulse Resp BP Pulse Ox


 


 97.9 F   111 H  19   134/85   97 


 


 08/31/20 11:13  08/31/20 13:29  08/31/20 11:13  08/31/20 13:29  08/31/20 11:13











General appearance: Present: no acute distress





- EENT


ENT: hearing intact





- Neck


Neck: Present: normal ROM





- Respiratory


Respiratory effort: normal


Respiratory: bilateral: diminished





- Cardiovascular


Rhythm: regular


Heart Sounds: Present: S1 & S2.  Absent: systolic murmur, diastolic murmur





- Extremities


Extremities: no ischemia, pulses intact, pulses symmetrical, No edema, normal 

temperature, normal color, Full ROM





- Abdominal


General gastrointestinal: Present: soft, non-tender, non-distended, normal bowel

 sounds





- Integumentary


Integumentary: Present: clear, warm, dry





- Musculoskeletal


Musculoskeletal: strength equal bilaterally





- Psychiatric


Psychiatric: cooperative





- Neurologic


Neurologic: CNII-XII intact, no focal deficits, moves all extremities





- Allied Health


Allied health notes reviewed: nursing, PT, ST, OT, social work, case management





Plan


Activity: advance as tolerated


Diet: per dietitian instruction, advance as tolerated


Special Instructions: record daily BP diary, physical therapy


Follow up with: 


PRIMARY CARE,MD [Primary Care Provider] - 7 Days


AGUSTÍN STUART MD [Staff Physician] - 7 Days


Prescriptions: 


VALPROIC ACID Liq [DepaKENE Liq] 1,500 mg PO QHS #30 oral.liqd


traZODone [Desyrel] 50 mg PO QHS #30 tablet


OLANzapine [ZyPREXA] 5 mg PO QHS #30 tablet


amLODIPine 10 mg PO QDAY #30 tablet


hydrALAZINE [Apresoline TAB] 75 mg PO TID 30 Days #270 tablet


cloNIDine-TTS PATCH [Catapres-Tts 0.2mg Patch] 0.2 mg TD Mo #12 patch


Benztropine [Cogentin] 1 mg PO BID #60 tablet


predniSONE [Deltasone] 10 mg PO QDAY 3 Days #3 tablet


predniSONE [Deltasone] 20 mg PO QDAY 2 Days #4 tablet


Apixaban [Eliquis] 2.5 mg PO BID #60 tablet


Paliperidone Palmitate [Invega Sustenna] 325 mg IM Q3W #2 syr





<CASA ULRICH - Last Filed: 08/31/20 16:53>





Providers





- Providers


Date of Admission: 


08/14/20 15:51





Attending physician: 


CASA ULRICH MD





                                        





08/14/20 07:59


Consult to Mental Health [CONS] Urgent 


   Reason For Exam: Catatonic state w/ psych history





08/14/20 08:16


Consult to Physician [CONS] Routine 


   Comment: 


   Consulting Provider: JUANA CAMPOS


   Physician Instructions: 


   Reason For Exam: Covid PUI





08/14/20 09:30


Consult to Case Management [CONS] Routine 


   Services Needed at Discharge: 


   Notified:: yes


   Phone number called:: in person


   Was contact made?: No


   Time called:: 13:30





08/14/20 11:33


Consult to Physician [CONS] Routine 


   Comment: 


   Consulting Provider: VIBHA REYNOLDS


   Physician Instructions: 


   Reason For Exam: NEUROLEPTIC MALIGNANT SYNDROME





08/16/20 14:51


Consult to Dietitian/Nutrition [CONS] Routine 


   Physician Instructions: 


   Reason For Exam: 


   Reason for Consult: Write/Manage Tube Feeding





08/16/20 14:52


Consult to Dietitian/Nutrition [CONS] Routine 


   Physician Instructions: 


   Reason For Exam: 


   Reason for Consult: Write/Manage Tube Feeding





08/18/20 11:47


Physical Therapy Evaluation and Treat [CONS] Routine 


   Comment: 


   Reason For Exam: placement


Speech Therapy Evaluation and Treat [CONS] Routine 


   Reason For Exam: aspiration





08/20/20 12:24


Consult to Physician [CONS] Routine 


   Comment: 


   Consulting Provider: [facility]


   Physician Instructions: 


   Reason For Exam: PEG placement





08/20/20 12:26


Consult to Physician [CONS] Routine 


   Comment: 


   Consulting Provider: TL TURNER


   Physician Instructions: 


   Reason For Exam: PEG placement





08/25/20 09:38


Physical Therapy Evaluation and Treat [CONS] Urgent 


   Comment: 


   Reason For Exam: To assist with transitional planning


   Date of last referral: 08/19/20











Primary care physician: 


PRIMARY CARE MD








Hospitalization


Hospital course: 





I saw and evaluated the patient. I agree with the findings and the plan of care 

as documented in the Nurse Practitioner's~note, with the following corrections 

and additions.








Exam





- Constitutional


Vitals: 


                                        











Temp Pulse Resp BP Pulse Ox


 


 97.9 F   111 H  19   134/85   97 


 


 08/31/20 11:13  08/31/20 13:29  08/31/20 11:13  08/31/20 13:29  08/31/20 11:13

## 2020-09-01 VITALS — SYSTOLIC BLOOD PRESSURE: 107 MMHG | DIASTOLIC BLOOD PRESSURE: 50 MMHG

## 2020-09-01 RX ADMIN — INSULIN LISPRO SCH: 100 INJECTION, SOLUTION INTRAVENOUS; SUBCUTANEOUS at 00:33

## 2020-09-01 RX ADMIN — INSULIN LISPRO SCH UNIT: 100 INJECTION, SOLUTION INTRAVENOUS; SUBCUTANEOUS at 13:01

## 2020-09-01 RX ADMIN — INSULIN LISPRO SCH: 100 INJECTION, SOLUTION INTRAVENOUS; SUBCUTANEOUS at 11:27

## 2020-09-01 RX ADMIN — Medication SCH ML: at 11:37

## 2020-09-01 RX ADMIN — BENZTROPINE MESYLATE SCH MG: 1 TABLET ORAL at 11:25

## 2020-09-01 NOTE — CAT SCAN REPORT
CT head/brain wo con



INDICATION / CLINICAL INFORMATION:

55 years Female; FALL. 



TECHNIQUE: Routine CT head without contrast. All CT scans at this location are performed using CT dos
e reduction for ALARA by means of automated exposure control. 



COMPARISON: 

The study is compared to the previous CT of 8/16/2020.



FINDINGS:



BRAIN / INTRACRANIAL CONTENTS: There again appear to be mild paravertebral white matter changes most 
consistent with microvascular angiopathy. The findings correlate with the previous CT. This also mild
 cerebral atrophy. The ventricular system remains appropriate in size and configuration.



There are small foci of calcification within the basal ganglia which are unchanged. There is no CT ev
idence of acute intracranial hemorrhage or significant mass effect. 



ORBITS: No significant abnormality of visualized orbits.

SINUSES / MASTOIDS: There has been some aeration of the left maxillary sinus from the previous CT. Ho
wever, there is continued a prominent mucosal thickening and air-fluid level. There is continued comp
lete opacification of the left frontal sinus with mild improvement of the opacification within the le
ft ethmoid air cells. There has also been interval mild aeration of the left sphenoid sinus.



CRANIOCERVICAL JUNCTION: No significant abnormality.

ADDITIONAL FINDINGS: None. 



IMPRESSION:

1. There is no CT evidence of acute intracranial process.

2. There is continued extensive left-sided paranasal sinus inflammatory disease though there has been
 some mild improvement aeration from the previous CT of 8/16/2020. 



Signer Name: Eric Best MD 

Signed: 9/1/2020 11:39 AM

Workstation Name: [id number]

## 2020-09-01 NOTE — PROGRESS NOTE
Assessment and Plan


Assessment and plan: 


This is a 55-year-old female with paranoid schizophrenia that presents to the 

emergency department on 8/13 for altered mental status in a catatonic state.  

Previous hospitalizations reviewed and only notes a history of paranoid 

schizophrenia. Patient may be a resident of [facility].  Patient is currently 

nonverbal and HPI is received from ER documentation.  Work-up in the emergency 

department included a CT abdomen pelvis which shows patchy peripheral 

groundglass opacities bilateral lungs which is consistent with either viral or 

atypical pneumonia, positive anterior lateral fat hernia in the left mid abdomen

and a large uterine fibroids however her CXR showed no acute pulmonary or 

pleural abnormalities.  She was found to have acute kidney injury with a 

creatinine of 1.4/BUN 33 as baseline from previous records seems to be 0.7, 

rhabdomyolysis with a creatinine kinase of 33,481, leukocytosis with a WBC of 

13.8, thrombocytopenia with platelets at 97.  She has COVID pneumonia with IV 

antibiotics, rhabdomyolysis, and WHITNEY. Infectious disease, psych, and neurology 

have been consulted. Neurology does not suspect NMS at this time. Psych has 

signed off at this time due to ongoing acute medical processes.  PT/ST consulted

for evaluation but unable to be performed as patient will not follow verbal 

commands.  She will open her eyes to verbal stimuli and intermittently follow 

commands.  Patient remains nonverbal and only groans/moans to painful stimuli. 

Currently on RA. Her caregiver has agreed to bring pt home with HHPT. 





* 8/27: continue care, pending discharge details w/ CM and caregiver


* 8/26: continue supportive care, awaiting placement with HHPT


* 8/25: Continue care, psych reconsulted but signed off with some medication 

  adjustments


* 8/24: pureed diet


* 8/21: continue care


* 8/20: GI consult for peg


* 8/19: continue care


* Discussed with primary caretaker patient was normally verbal but sometimes 

  goes into a catatonic state.  Phone number for this is [phone]


* 8/18: PT consulted.  Psych signed off.  Continue treatment


* 8/17: continue treatment


* 8/16: Change fluids to NS 1/2 AT 125CC/hr. Rhabdomylysis improving, insert NGT

  and start tube feeds. Psych input noted. Continue management for COVID 19. ID 

  consulted. No new fever.  CTA chest and CTH obtained which were both negative


* 8/15: COVID PCR Positive


8/29: Patient remains in the hospital as hospital bed is being arranged for the 

patient awaiting placement at this time.  Discharge was planned for yesterday 

this is held up due to bed condition.  Discussed with nursing staff to continue 

passive range of motion with the patient sitting up out of bed to chair and fall

precautions.





8/30: Clinical stable, continue to encourage OOB to chair, ANTICIPATE DISCHARGE 

IN AM





9/1: Over the past 2448 hrs. patient has remained stable did have a fall earlier

this morning was found on the floor is unsure how she got to the floor no pain 

points felt on multiple palpation.  She awakens.  She is able to feed herself 

some although falls asleep.  She does not verbalize any headache.  Due to noted 

thrombocytopenia will order a stat CT head just to ensure no other pathology and

will proceed with discharge plan.





Patient Problems


(1) Pneumonia due to COVID-19 virus


Current Visit: Yes   Status: Acute   


Plan to address problem: 


- COVID protocol initiated


- IV azithromycin and Rocephin completed


- 8/14 COVID PCR positive


- Trend LDH, CRP, procalcitonin, d-dimer, ferritin 


- Droplet /contact precautions


- Supplemental oxygenation as needed


- Infectious disease consulted


- Pulmonary hygiene


- In setting of elevated D-Dimer, CTA Chest was ordered which was negative for 

pulmonary embolism


- Anticoagulation per COVID protocol, will DC with Eliquis 2.5 BID PO for 30 

days


- Cannot initiate Remdesivir in setting of elevated LFTs


- IV solumedrel changed to PO prednisone and started on taper








(2) Hypertension


Current Visit: Yes   Status: Chronic   


Plan to address problem: 


- BP monitoring per protocol


- PRN hydralizine


- Clonidine TD and hydralazine PO








(3) Paranoid schizophrenia


Current Visit: Yes   Status: Chronic   


Plan to address problem: 


-History of present schizophrenia


-8/24 Psych reconsulted


-Depakote and Haldol 


-Sleep hygiene


-Reorientation as needed








(4) Severe sepsis


Current Visit: Yes   Status: Resolved   


Plan to address problem: 


- Presented with low-grade fever, tachycardia, leukocytosis, WHITNEY, and bilateral 

pneumonia


- Likely source is bilateral pneumonia secondary to COVID-19


- IV antibiotics completed


- Covid protocol initiated











(5) thrombocytopenia 











(6) DVT prophylaxis


Current Visit: Yes   Status: Acute   


Plan to address problem: 


- SCDs to BLE while in bed


- Lovenox subq twice daily per COVID protocol








(7) Discharge planning issues


Current Visit: Yes   Status: Acute   








History


Interval history: 


Patient seen and examined clinically improving answer some questions.  Eating 

her food although often falls asleep but wakes up.  When I touched her shoulder 

she looked at me as while trying to hurt her.





Hospitalist Physical





- Physical exam


Narrative exam: 


 General appearance: Present: no acute distress, obese. No acute event reported 

overnight intermittent drowsy but awakens.





- EENT


Eyes: Present: PERRL





- Neck


Neck: Present: normal ROM





- Respiratory


Respiratory effort: normal


Respiratory: bilateral: diminished (2/2 girth)





- Cardiovascular


Rhythm: regular


Heart Sounds: Present: S1 & S2.  Absent: systolic murmur, diastolic murmur





- Extremities


Extremities: no ischemia, pulses intact, pulses symmetrical, No edema, normal 

temperature, normal color, Full ROM


Peripheral Pulses: within normal limits





- Abdominal


General gastrointestinal: soft, non-tender, normal bowel sounds





- Integumentary


Integumentary: Present: clear, warm, dry





- Psychiatric


Psychiatric: cooperative





- Neurologic


Neurologic: no focal deficits, moves all extremities





- Allied Health


Allied health notes reviewed: nursing, social work, case management








- Constitutional


Vitals: 


                                        











Temp Pulse Resp BP Pulse Ox


 


 97.2 F L  111 H  20   143/81   92 


 


 09/01/20 05:06  09/01/20 05:06  09/01/20 05:06  09/01/20 05:06  09/01/20 05:06











General appearance: Present: no acute distress





Results





- Labs


CBC & Chem 7: 


                                 08/29/20 05:31





                                 08/27/20 04:28


Labs: 


                             Laboratory Last Values











WBC  11.2 K/mm3 (4.5-11.0)  H  08/29/20  05:31    


 


RBC  4.87 M/mm3 (3.65-5.03)   08/29/20  05:31    


 


Hgb  14.9 gm/dl (10.1-14.3)  H  08/29/20  05:31    


 


Hct  44.5 % (30.3-42.9)  H  08/29/20  05:31    


 


MCV  91 fl (79-97)   08/29/20  05:31    


 


MCH  31 pg (28-32)   08/29/20  05:31    


 


MCHC  34 % (30-34)   08/29/20  05:31    


 


RDW  16.5 % (13.2-15.2)  H  08/29/20  05:31    


 


Plt Count  98 K/mm3 (140-440)  L  08/29/20  05:31    


 


Lymph % (Auto)  10.6 % (13.4-35.0)  L  08/13/20  22:44    


 


Mono % (Auto)  9.8 % (0.0-7.3)  H  08/13/20  22:44    


 


Eos % (Auto)  0.0 % (0.0-4.3)   08/13/20  22:44    


 


Baso % (Auto)  1.2 % (0.0-1.8)   08/13/20  22:44    


 


Lymph #  1.5 K/mm3 (1.2-5.4)   08/13/20  22:44    


 


Mono #  1.3 K/mm3 (0.0-0.8)  H  08/13/20  22:44    


 


Eos #  0.0 K/mm3 (0.0-0.4)   08/13/20  22:44    


 


Baso #  0.2 K/mm3 (0.0-0.1)  H  08/13/20  22:44    


 


Add Manual Diff  Complete   08/21/20  04:50    


 


Total Counted  100   08/21/20  04:50    


 


Seg Neutrophils %  78.4 % (40.0-70.0)  H  08/13/20  22:44    


 


Seg Neuts % (Manual)  87.0 % (40.0-70.0)  H  08/21/20  04:50    


 


Band Neutrophils %  4.0 %  08/21/20  04:50    


 


Lymphocytes % (Manual)  2.0 % (13.4-35.0)  L  08/21/20  04:50    


 


Reactive Lymphs % (Man)  0 %  08/21/20  04:50    


 


Monocytes % (Manual)  6.0 % (0.0-7.3)   08/21/20  04:50    


 


Eosinophils % (Manual)  0 % (0.0-4.3)   08/21/20  04:50    


 


Basophils % (Manual)  0 % (0.0-1.8)   08/21/20  04:50    


 


Metamyelocytes %  1.0 %  08/21/20  04:50    


 


Myelocytes %  0 %  08/21/20  04:50    


 


Promyelocytes %  0 %  08/21/20  04:50    


 


Blast Cells %  0 %  08/21/20  04:50    


 


Nucleated RBC %  1.0 % (0.0-0.9)  H  08/21/20  04:50    


 


Seg Neutrophils #  10.8 K/mm3 (1.8-7.7)  H  08/13/20  22:44    


 


Seg Neutrophils # Man  14.4 K/mm3 (1.8-7.7)  H  08/21/20  04:50    


 


Band Neutrophils #  0.7 K/mm3  08/21/20  04:50    


 


Lymphocytes # (Manual)  0.3 K/mm3 (1.2-5.4)  L  08/21/20  04:50    


 


Abs React Lymphs (Man)  0.0 K/mm3  08/21/20  04:50    


 


Monocytes # (Manual)  1.0 K/mm3 (0.0-0.8)  H  08/21/20  04:50    


 


Eosinophils # (Manual)  0.0 K/mm3 (0.0-0.4)   08/21/20  04:50    


 


Basophils # (Manual)  0.0 K/mm3 (0.0-0.1)   08/21/20  04:50    


 


Metamyelocytes #  0.2 K/mm3  08/21/20  04:50    


 


Myelocytes #  0.0 K/mm3  08/21/20  04:50    


 


Promyelocytes #  0.0 K/mm3  08/21/20  04:50    


 


Blast Cells #  0.0 K/mm3  08/21/20  04:50    


 


WBC Morphology  Not Reportable   08/21/20  04:50    


 


Hypersegmented Neuts  Not Reportable   08/21/20  04:50    


 


Hyposegmented Neuts  Not Reportable   08/21/20  04:50    


 


Hypogranular Neuts  Not Reportable   08/21/20  04:50    


 


Smudge Cells  Not Reportable   08/21/20  04:50    


 


Toxic Granulation  Not Reportable   08/21/20  04:50    


 


Toxic Vacuolation  Not Reportable   08/21/20  04:50    


 


Dohle Bodies  Not Reportable   08/21/20  04:50    


 


Pelger-Huet Anomaly  Not Reportable   08/21/20  04:50    


 


Arnie Rods  Not Reportable   08/21/20  04:50    


 


Platelet Estimate  Consistent w auto   08/21/20  04:50    


 


Clumped Platelets  Not Reportable   08/21/20  04:50    


 


Plt Clumps, EDTA  Not Reportable   08/21/20  04:50    


 


Large Platelets  Not Reportable   08/21/20  04:50    


 


Giant Platelets  Not Reportable   08/21/20  04:50    


 


Platelet Satelliting  Not Reportable   08/21/20  04:50    


 


Plt Morphology Comment  Not Reportable   08/21/20  04:50    


 


RBC Morphology  Not Reportable   08/21/20  04:50    


 


Dimorphic RBCs  Not Reportable   08/21/20  04:50    


 


Polychromasia  Not Reportable   08/21/20  04:50    


 


Hypochromasia  Not Reportable   08/21/20  04:50    


 


Poikilocytosis  Not Reportable   08/21/20  04:50    


 


Anisocytosis  Not Reportable   08/21/20  04:50    


 


Microcytosis  Not Reportable   08/21/20  04:50    


 


Macrocytosis  1+   08/21/20  04:50    


 


Spherocytes  Not Reportable   08/21/20  04:50    


 


Pappenheimer Bodies  Not Reportable   08/21/20  04:50    


 


Sickle Cells  Not Reportable   08/21/20  04:50    


 


Target Cells  1+   08/21/20  04:50    


 


Tear Drop Cells  Not Reportable   08/21/20  04:50    


 


Ovalocytes  Not Reportable   08/21/20  04:50    


 


Helmet Cells  Not Reportable   08/21/20  04:50    


 


Mancera-Hopeton Bodies  Not Reportable   08/21/20  04:50    


 


Cabot Rings  Not Reportable   08/21/20  04:50    


 


[place] Cells  Not Reportable   08/21/20  04:50    


 


Bite Cells  Not Reportable   08/21/20  04:50    


 


Crenated Cell  Not Reportable   08/21/20  04:50    


 


Elliptocytes  Not Reportable   08/21/20  04:50    


 


Acanthocytes (Spur)  Not Reportable   08/21/20  04:50    


 


Rouleaux  Not Reportable   08/21/20  04:50    


 


Hemoglobin C Crystals  Not Reportable   08/21/20  04:50    


 


Schistocytes  Not Reportable   08/21/20  04:50    


 


Malaria parasites  Not Reportable   08/21/20  04:50    


 


Dakota Bodies  Not Reportable   08/21/20  04:50    


 


Hem Pathologist Commnt  No   08/21/20  04:50    


 


PT  14.9 Sec. (12.2-14.9)   08/20/20  15:23    


 


INR  1.15  (0.87-1.13)  H  08/20/20  15:23    


 


APTT  29.5 Sec. (24.2-36.6)   08/20/20  15:23    


 


D-Dimer  306.74 ng/mlDDU (0-234)  H  08/30/20  05:49    


 


Sodium  141 mmol/L (137-145)   08/27/20  04:28    


 


Potassium  4.0 mmol/L (3.6-5.0)   08/27/20  04:28    


 


Chloride  104.8 mmol/L ()   08/27/20  04:28    


 


Carbon Dioxide  24 mmol/L (22-30)   08/27/20  04:28    


 


Anion Gap  16 mmol/L  08/27/20  04:28    


 


BUN  20 mg/dL (7-17)  H  08/27/20  04:28    


 


Creatinine  0.8 mg/dL (0.6-1.2)   08/27/20  04:28    


 


Estimated GFR  > 60 ml/min  08/27/20  04:28    


 


BUN/Creatinine Ratio  25 %  08/27/20  04:28    


 


Glucose  164 mg/dL ()  H  08/27/20  04:28    


 


POC Glucose  120  ()  H  09/01/20  08:08    


 


Lactic Acid  1.30 mmol/L (0.7-2.0)   08/13/20  22:44    


 


Calcium  8.4 mg/dL (8.4-10.2)   08/27/20  04:28    


 


Ferritin  991.6 ng/mL (10.0-200.0)  H  08/22/20  09:32    


 


Total Bilirubin  0.40 mg/dL (0.1-1.2)   08/18/20  08:32    


 


Direct Bilirubin  0.3 mg/dL (0-0.2)  H  08/13/20  22:44    


 


Indirect Bilirubin  0.2 mg/dL  08/13/20  22:44    


 


AST  149 units/L (5-40)  H  08/18/20  08:32    


 


ALT  96 units/L (7-56)  H  08/18/20  08:32    


 


Alkaline Phosphatase  40 units/L ()   08/18/20  08:32    


 


Lactate Dehydrogenase  716 units/L ()  H  08/22/20  09:32    


 


Total Creatine Kinase  934 units/L ()  H  08/18/20  08:32    


 


C-Reactive Protein  0.80 mg/dL (0.00-1.30)   08/22/20  09:32    


 


Total Protein  6.1 g/dL (6.3-8.2)  L  08/18/20  08:32    


 


Albumin  2.5 g/dL (3.9-5)  L  08/18/20  08:32    


 


Albumin/Globulin Ratio  0.7 %  08/18/20  08:32    


 


Procalcitonin  0.71 ng/mL (<0.15)   08/18/20  08:32    


 


Urine Color  Tiffany  (Yellow)   08/14/20  Unknown


 


Urine Turbidity  Slightly-cloudy  (Clear)   08/14/20  Unknown


 


Urine pH  5.0  (5.0-7.0)   08/14/20  Unknown


 


Ur Specific Gravity  1.026  (1.003-1.030)   08/14/20  Unknown


 


Urine Protein  100 mg/dl mg/dL (Negative)   08/14/20  Unknown


 


Urine Glucose (UA)  Neg mg/dL (Negative)   08/14/20  Unknown


 


Urine Ketones  Tr mg/dL (Negative)   08/14/20  Unknown


 


Urine Blood  Lg  (Negative)   08/14/20  Unknown


 


Urine Nitrite  Neg  (Negative)   08/14/20  Unknown


 


Urine Bilirubin  Neg  (Negative)   08/14/20  Unknown


 


Urine Urobilinogen  2.0 mg/dL (<2.0)   08/14/20  Unknown


 


Ur Leukocyte Esterase  Neg  (Negative)   08/14/20  Unknown


 


Urine WBC (Auto)  17.0 /HPF (0.0-6.0)  H  08/14/20  Unknown


 


Urine RBC (Auto)  1.0 /HPF (0.0-6.0)   08/14/20  Unknown


 


U Epithel Cells (Auto)  4.0 /HPF (0-13.0)   08/14/20  Unknown


 


Urine Mucus  2+ /HPF  08/14/20  Unknown


 


Salicylates  < 0.3 mg/dL (2.8-20.0)  L  08/13/20  22:44    


 


Urine Opiates Screen  Presumptive negative   08/14/20  Unknown


 


Urine Methadone Screen  Presumptive negative   08/14/20  Unknown


 


Acetaminophen  5.0 ug/mL (10.0-30.0)  L  08/13/20  22:44    


 


Ur Barbiturates Screen  Presumptive negative   08/14/20  Unknown


 


Valproic Acid  31.8 ug/mL ()  L  08/17/20  08:46    


 


Ur Phencyclidine Scrn  Presumptive negative   08/14/20  Unknown


 


Ur Amphetamines Screen  Presumptive negative   08/14/20  Unknown


 


U Benzodiazepines Scrn  Presumptive negative   08/14/20  Unknown


 


Urine Cocaine Screen  Presumptive negative   08/14/20  Unknown


 


U Marijuana (THC) Screen  Presumptive negative   08/14/20  Unknown


 


Drugs of Abuse Note  Disclamer   08/14/20  Unknown


 


Plasma/Serum Alcohol  < 0.01 % (0-0.07)   08/13/20  22:44    


 


Coronavirus (PCR)  Positive  (Negative)  A  08/15/20  Unknown











Schmidt/IV: 


                                        





Voiding Method                   External Female Catheter


IV Catheter Type [Right Upper    Peripheral IV


arm]                             


IV Catheter Type [Right          INT / Saline Lock


Antecubital]                     


IV Catheter Type [Right Foot]    INT / Saline Lock


IV Catheter Type [Right          Peripheral IV


External Jugular]                











Active Medications





- Current Medications


Current Medications: 














Generic Name Dose Route Start Last Admin





  Trade Name Freq  PRN Reason Stop Dose Admin


 


Acetaminophen  650 mg  08/14/20 08:00  08/21/20 12:10





  Tylenol  PO   650 mg





  Q4H PRN   Administration





  Pain MILD(1-3)/Fever >100.5/HA  


 


Amlodipine Besylate  10 mg  08/24/20 19:00  08/31/20 10:37





  Amlodipine  PO   10 mg





  QDAY KATHYA   Administration


 


Lipase/Protease/Amylase  1 each  08/17/20 10:34 





  Pancreazkim Clay 10,500 Unit  FEEDTUBE  





  PRN PRN  





  For Clogged Feeding Tube  


 


Benztropine Mesylate  1 mg  08/24/20 15:00  08/31/20 23:46





  Cogentin  PO   1 mg





  BID KATHYA   Administration


 


Clonidine HCl  0.2 mg  08/24/20 13:00  08/31/20 13:29





  Catapres-Tts Patch  TD   0.2 mg





  Mo KATHYA   Administration


 


Enoxaparin Sodium  40 mg  09/01/20 22:00 





  Enoxaparin  SUB-Q  





  QDAY@2200 KATHYA  


 


Hydralazine HCl  5 mg  08/24/20 16:44 





  Apresoline  IV  





  Q30MIN PRN  





  Hypertension  


 


Hydralazine HCl  75 mg  08/27/20 15:55  08/31/20 13:29





  Apresoline  PO   75 mg





  TID KATHYA   Administration


 


Insulin Human Lispro  0 unit  08/25/20 22:00  09/01/20 00:33





  Humalog  SUB-Q   Not Given





  ACHS KATHYA  





  Protocol  


 


Olanzapine  5 mg  08/25/20 22:00  08/31/20 23:46





  Zyprexa  PO   5 mg





  QHS KATHYA   Administration


 


Ondansetron HCl  4 mg  08/14/20 08:00 





  Zofran  IV  





  Q8H PRN  





  Nausea And Vomiting  


 


Prednisone  10 mg  08/31/20 10:00  08/31/20 10:35





  Deltasone  PO  09/02/20 23:55  10 mg





  QDAY KATHYA   Administration


 


Simple Syrup  15 ml  08/17/20 10:34 





  Simple Syrup  FEEDTUBE  





  PRN PRN  





  Hypoglycemia  


 


Simple Syrup  30 ml  08/17/20 10:34 





  Simple Syrup  FEEDTUBE  





  PRN PRN  





  Hypoglycemia  


 


Sodium Bicarbonate  325 mg  08/17/20 10:34 





  Sodium Bicarbonate  FEEDTUBE  





  PRN PRN  





  For Clogged Feeding Tube  


 


Sodium Chloride  10 ml  08/14/20 10:00  08/31/20 23:47





  Sodium Chloride Flush Syringe 10 Ml  IV   10 ml





  BID KATHYA   Administration


 


Trazodone HCl  50 mg  08/14/20 22:00  08/31/20 23:47





  Desyrel  PO   50 mg





  QHS KATHYA   Administration


 


Valproic Acid  1,500 mg  08/18/20 22:00  08/31/20 23:46





  Depakene Liq  PO   1,500 mg





  QHS KATHYA   Administration














Nutrition/Malnutrition Assess





- Dietary Evaluation


Nutrition/Malnutrition Findings: 


                                        





Nutrition Notes                                            Start:  08/17/20 

09:33


Freq:                                                      Status: Active       




Protocol:                                                                       




 Document     08/26/20 15:29  LM  (Rec: 08/26/20 15:35  LM  [id number])


 Nutrition Notes


     Initial or Follow up                        Reassessment


     Current Diagnosis                           Acute Kidney Injury,Sepsis


     Other Pertinent Diagnosis                   COVID-19 (+), Schizoaffective


                                                 disorder


     Current Diet                                Pureed


     Labs/Tests                                  POC glu 203


     Pertinent Medications                       Humalog


     Height                                      5 ft 6 in


     Weight                                      114.3 kg


     Ideal Body Weight (kg)                      59.09


     BMI                                         40.6


     Subjective/Other Information                Pt with good intakes in chart.


                                                 Pt ate 75% of breakfast today


                                                 . Pt not getting PEG.


     Percent of energy/protein needs met:        85%/83%


     Burn                                        Absent


     Trauma                                      Absent


     GI Symptoms                                 None


     Food Allergy                                No


     Current % PO                                Good (%)


     Minimum of two criteria                     No


     Reduced  Strength                       Measurably Reduced (severe)


     #1


      Nutrition Diagnosis                        Inadequate oral intake


      As Evidenced by Signs and Symptoms         pt eating 75% of pureed diet


      Diagnosis Progress(for reassessment        Improved


       documentation)                            


     Is patient on ventilator?                   No


     Is Patient Ambulatory and/or Out of Bed     No


     REE-([facility]-confined to bed)      [phone]


     Kcal/Kg value to use for calculation        14


     Approximate Energy Requirements Using       1600


      kcal/Kg                                    


     Calculation Used for Recommendations        Kcal/kg


     Additional Notes                            Pro needs 0.8-1g/kg adjBW: 69-


                                                 87g/day


                                                 Fluid needs 1ml/kcal


 Nutrition Intervention


     Change Diet Order:                          Continue current diet order as


                                                 tolerated; add consistent CHO


                                                 modifier


     Goal #1                                     Meet at least 75% of energy


                                                 and protein needs


     Anticipated Discharge Needs:                Unable to determine at this


                                                 time


     Follow-Up By:                               09/01/20


     Additional Comments                         F/U for stable intakes